# Patient Record
Sex: FEMALE | Race: BLACK OR AFRICAN AMERICAN | NOT HISPANIC OR LATINO | Employment: OTHER | URBAN - METROPOLITAN AREA
[De-identification: names, ages, dates, MRNs, and addresses within clinical notes are randomized per-mention and may not be internally consistent; named-entity substitution may affect disease eponyms.]

---

## 2017-06-08 ENCOUNTER — OFFICE VISIT (OUTPATIENT)
Dept: OTOLARYNGOLOGY | Facility: CLINIC | Age: 67
End: 2017-06-08
Payer: MEDICARE

## 2017-06-08 VITALS
WEIGHT: 189.63 LBS | SYSTOLIC BLOOD PRESSURE: 129 MMHG | HEART RATE: 80 BPM | HEIGHT: 68 IN | DIASTOLIC BLOOD PRESSURE: 80 MMHG | BODY MASS INDEX: 28.74 KG/M2

## 2017-06-08 DIAGNOSIS — H93.8X3 EAR PRESSURE, BILATERAL: ICD-10-CM

## 2017-06-08 DIAGNOSIS — H61.23 HEARING LOSS DUE TO CERUMEN IMPACTION, BILATERAL: ICD-10-CM

## 2017-06-08 DIAGNOSIS — H61.23 BILATERAL IMPACTED CERUMEN: Primary | ICD-10-CM

## 2017-06-08 PROCEDURE — 1126F AMNT PAIN NOTED NONE PRSNT: CPT | Mod: S$GLB,,, | Performed by: NURSE PRACTITIONER

## 2017-06-08 PROCEDURE — 69210 REMOVE IMPACTED EAR WAX UNI: CPT | Mod: S$GLB,,, | Performed by: NURSE PRACTITIONER

## 2017-06-08 PROCEDURE — 1159F MED LIST DOCD IN RCRD: CPT | Mod: S$GLB,,, | Performed by: NURSE PRACTITIONER

## 2017-06-08 PROCEDURE — 99203 OFFICE O/P NEW LOW 30 MIN: CPT | Mod: 25,S$GLB,, | Performed by: NURSE PRACTITIONER

## 2017-06-08 PROCEDURE — 99999 PR PBB SHADOW E&M-EST. PATIENT-LVL III: CPT | Mod: PBBFAC,,, | Performed by: NURSE PRACTITIONER

## 2017-06-08 NOTE — PROGRESS NOTES
Subjective:       Patient ID: Tali Fitzpatrick is a 67 y.o. female.    Chief Complaint: Cerumen Impaction and Hearing Loss    HPI   Patient reports bilateral aural fullness and muffled hearing. Daughter tells her TV volume is too loud. She has not been able to hear well for approx one month. She has mild discomfort described as pressure AU, but denies otalgia or otorrhea.     Review of Systems   Constitutional: Negative.    HENT: Positive for hearing loss. Negative for ear discharge and ear pain.         Ear pressure   Eyes: Negative.    Respiratory: Negative.    Cardiovascular: Negative.    Gastrointestinal: Negative.    Musculoskeletal: Negative.    Skin: Negative.    Neurological: Negative.    Hematological: Negative.    Psychiatric/Behavioral: Negative.        Objective:      Physical Exam   Constitutional: She is oriented to person, place, and time. Vital signs are normal. She appears well-developed and well-nourished. She is cooperative. She does not appear ill. No distress.   HENT:   Head: Normocephalic and atraumatic.   Right Ear: Hearing, tympanic membrane, external ear and ear canal normal. Tympanic membrane is not erythematous. No middle ear effusion.   Left Ear: Hearing, tympanic membrane, external ear and ear canal normal. Tympanic membrane is not erythematous.  No middle ear effusion.   Nose: Nose normal. No mucosal edema or rhinorrhea. Right sinus exhibits no maxillary sinus tenderness and no frontal sinus tenderness. Left sinus exhibits no maxillary sinus tenderness and no frontal sinus tenderness.   Mouth/Throat: Uvula is midline, oropharynx is clear and moist and mucous membranes are normal. Mucous membranes are not pale, not dry and not cyanotic. No oral lesions. No oropharyngeal exudate, posterior oropharyngeal edema or posterior oropharyngeal erythema.     SEPARATE PROCEDURE IN OFFICE:   Procedure: Removal of impacted cerumen, bilateral   Pre Procedure Diagnosis: Cerumen Impaction   Post  Procedure Diagnosis: Cerumen Impaction   Verbal informed consent in regards to risk of trauma to ear canal, ear drum or hearing, discomfort during procedure and/or inability to remove cerumen impaction in one session or unforeseen events or complications.   No anesthesia.     Procedure in detail:   Ear canal visualized bilateral with appropriate size ear speculum utilizing Operating Head Binocular Otomicroscope   Utilizing the following: Ring curet, delicate alligator forceps, and/or suction cannula. The impacted cerumen of the ear canals was removed atraumatically. The TM and EAC were then inspected and found to be clear of wax. See description of TMs/EACs in PE above.   Complications: No   Condition: Improved/Good     Eyes: Conjunctivae, EOM and lids are normal. Pupils are equal, round, and reactive to light. Right eye exhibits no discharge. Left eye exhibits no discharge. No scleral icterus.   Neck: Trachea normal and normal range of motion. Neck supple. No tracheal deviation present. No thyroid mass and no thyromegaly present.   Cardiovascular: Normal rate.    Pulmonary/Chest: Effort normal. No stridor. No respiratory distress. She has no wheezes.   Musculoskeletal: Normal range of motion.   Lymphadenopathy:        Head (right side): No submental, no submandibular, no tonsillar, no preauricular and no posterior auricular adenopathy present.        Head (left side): No submental, no submandibular, no tonsillar, no preauricular and no posterior auricular adenopathy present.     She has no cervical adenopathy.        Right cervical: No superficial cervical and no posterior cervical adenopathy present.       Left cervical: No superficial cervical and no posterior cervical adenopathy present.   Neurological: She is alert and oriented to person, place, and time. She has normal strength. Coordination and gait normal.   Skin: Skin is warm, dry and intact. No lesion and no rash noted. She is not diaphoretic. No cyanosis.  No pallor.   Psychiatric: She has a normal mood and affect. Her speech is normal and behavior is normal. Judgment and thought content normal. Cognition and memory are normal.   Nursing note and vitals reviewed.      Assessment:     Ear pressure, bilateral, new problem    Muffled hearing, bilateral, new problem  Cerumen impactions removed bilaterally  Plan:     Patient reported immediate resolution of aural symptoms following cerumen impaction removal and did not wish to proceed with audiogram at this time.    Return to clinic as needed for further ENT concerns

## 2017-06-13 DIAGNOSIS — M25.561 PAIN IN BOTH KNEES, UNSPECIFIED CHRONICITY: Primary | ICD-10-CM

## 2017-06-13 DIAGNOSIS — M25.562 PAIN IN BOTH KNEES, UNSPECIFIED CHRONICITY: Primary | ICD-10-CM

## 2017-06-22 ENCOUNTER — OFFICE VISIT (OUTPATIENT)
Dept: PHYSICAL MEDICINE AND REHAB | Facility: CLINIC | Age: 67
End: 2017-06-22
Payer: MEDICARE

## 2017-06-22 ENCOUNTER — HOSPITAL ENCOUNTER (OUTPATIENT)
Dept: RADIOLOGY | Facility: HOSPITAL | Age: 67
Discharge: HOME | End: 2017-06-22
Attending: ORTHOPAEDIC SURGERY
Payer: MEDICARE

## 2017-06-22 VITALS
BODY MASS INDEX: 28.64 KG/M2 | DIASTOLIC BLOOD PRESSURE: 83 MMHG | WEIGHT: 189 LBS | SYSTOLIC BLOOD PRESSURE: 132 MMHG | HEIGHT: 68 IN | HEART RATE: 79 BPM

## 2017-06-22 DIAGNOSIS — M25.561 PAIN IN BOTH KNEES, UNSPECIFIED CHRONICITY: ICD-10-CM

## 2017-06-22 DIAGNOSIS — M17.0 BILATERAL PRIMARY OSTEOARTHRITIS OF KNEE: Primary | ICD-10-CM

## 2017-06-22 DIAGNOSIS — G89.29 CHRONIC PAIN OF BOTH KNEES: Primary | ICD-10-CM

## 2017-06-22 DIAGNOSIS — M17.0 PRIMARY OSTEOARTHRITIS OF BOTH KNEES: ICD-10-CM

## 2017-06-22 DIAGNOSIS — M25.562 CHRONIC PAIN OF BOTH KNEES: Primary | ICD-10-CM

## 2017-06-22 DIAGNOSIS — M25.561 CHRONIC PAIN OF BOTH KNEES: Primary | ICD-10-CM

## 2017-06-22 DIAGNOSIS — M25.562 PAIN IN BOTH KNEES, UNSPECIFIED CHRONICITY: ICD-10-CM

## 2017-06-22 PROCEDURE — 99999 PR PBB SHADOW E&M-EST. PATIENT-LVL II: CPT | Mod: PBBFAC,,, | Performed by: PHYSICAL MEDICINE & REHABILITATION

## 2017-06-22 PROCEDURE — 99204 OFFICE O/P NEW MOD 45 MIN: CPT | Mod: S$GLB,,, | Performed by: PHYSICAL MEDICINE & REHABILITATION

## 2017-06-22 PROCEDURE — 1125F AMNT PAIN NOTED PAIN PRSNT: CPT | Mod: S$GLB,,, | Performed by: PHYSICAL MEDICINE & REHABILITATION

## 2017-06-22 PROCEDURE — 1159F MED LIST DOCD IN RCRD: CPT | Mod: S$GLB,,, | Performed by: PHYSICAL MEDICINE & REHABILITATION

## 2017-06-22 PROCEDURE — 73562 X-RAY EXAM OF KNEE 3: CPT | Mod: 26,50,, | Performed by: RADIOLOGY

## 2017-06-22 RX ORDER — MIDAZOLAM HYDROCHLORIDE 5 MG/ML
2 INJECTION INTRAMUSCULAR; INTRAVENOUS ONCE AS NEEDED
Status: CANCELLED | OUTPATIENT
Start: 2017-06-22 | End: 2017-06-22

## 2017-06-22 RX ORDER — ALPRAZOLAM 0.5 MG/1
1 TABLET ORAL NIGHTLY PRN
COMMUNITY
Start: 2017-06-20 | End: 2018-11-06 | Stop reason: DRUGHIGH

## 2017-06-22 RX ORDER — LIDOCAINE HYDROCHLORIDE 10 MG/ML
1 INJECTION, SOLUTION EPIDURAL; INFILTRATION; INTRACAUDAL; PERINEURAL ONCE
Status: CANCELLED | OUTPATIENT
Start: 2017-06-22 | End: 2017-06-22

## 2017-06-22 RX ORDER — SODIUM CHLORIDE, SODIUM LACTATE, POTASSIUM CHLORIDE, CALCIUM CHLORIDE 600; 310; 30; 20 MG/100ML; MG/100ML; MG/100ML; MG/100ML
INJECTION, SOLUTION INTRAVENOUS CONTINUOUS
Status: CANCELLED | OUTPATIENT
Start: 2017-06-22

## 2017-06-22 NOTE — PROGRESS NOTES
"[facility]    CHIEF COMPLAINT:   Chief Complaint   Patient presents with    Knee Pain     bilat knee pain     HISTORY OF PRESENT ILLNESS: Tali Fitzpatrick is a 67 y.o. female who presents to me for initial evaluation for bilateral knee pain L>R. She is interested in receiving cooled RFA for both her knees. She has been having knee pain for 10 years, and diagnosed with OA. She has had steroid injections in the past with no relief. She has had gel injections as well, which used to give her long term relief, however they no longer give her benefit. Her last gel injection was 1 year ago and did not get her any relief. She endorses her knees are more painful after activity, and occasionally will swell. They will also get "stuck" when they are extremely painful. Denies any grinding or clicking sensation. Her pain is achy in nature and she rates her pain as a 5/10.    Review of Systems   Constitutional: Negative for fever.   HENT: Negative for drooling.    Eyes: Negative for discharge.   Respiratory: Negative for choking.    Cardiovascular: Negative for chest pain.   Genitourinary: Negative for flank pain.   Skin: Negative for wound.   Allergic/Immunologic: Negative for immunocompromised state.   Neurological: Negative for tremors and syncope.   Psychiatric/Behavioral: Negative for behavioral problems.     Past Medical History:   Past Medical History:   Diagnosis Date    Anxiety and depression     DJD (degenerative joint disease)     bilateral knees   Hyalgan series completed 8/27/14    Hyperlipidemia     Hypertension     Insomnia        Past Surgical History:   Past Surgical History:   Procedure Laterality Date    CATARACT EXTRACTION W/  INTRAOCULAR LENS IMPLANT      left    LIPOSUCTION         Family History:   Family History   Problem Relation Age of Onset    Arthritis Mother     Coronary artery disease Mother     Coronary artery disease Father        Medications:   Current Outpatient " "Prescriptions on File Prior to Visit   Medication Sig Dispense Refill    amlodipine (NORVASC) 5 MG tablet Take 5 mg by mouth once daily.  3    atenolol (TENORMIN) 50 MG tablet       escitalopram oxalate (LEXAPRO) 20 MG tablet       gabapentin (NEURONTIN) 300 MG capsule Take 300 mg by mouth once daily.  3    hydrOXYzine (VISTARIL) 50 MG Cap   6    pravastatin (PRAVACHOL) 20 MG tablet Take 20 mg by mouth once daily.  6    temazepam (RESTORIL) 30 mg capsule   2     No current facility-administered medications on file prior to visit.        Allergies:   Review of patient's allergies indicates:   Allergen Reactions    Micardis [telmisartan]      Can't remember       Social History:   Social History     Social History    Marital status:      Spouse name: N/A    Number of children: N/A    Years of education: N/A     Social History Main Topics    Smoking status: Never Smoker    Smokeless tobacco: None    Alcohol use 0.0 oz/week      Comment: social    Drug use: No    Sexual activity: Not Asked     Other Topics Concern    None     Social History Narrative    None     Tali is a retired .    PHYSICAL EXAMINATION:   General    Vitals:    06/22/17 1250   BP: 132/83   Pulse: 79   Weight: 85.7 kg (189 lb)   Height: 5' 8" (1.727 m)     Constitutional: Oriented to person, place, and time. No apparent distress. Appears well-developed and well-nourished. Pleasant.  HENT:   Head: Normocephalic and atraumatic.   Eyes: Right eye exhibits no discharge. Left eye exhibits no discharge. No scleral icterus.   Pulmonary/Chest: Effort normal. No respiratory distress.   Abdominal: There is no guarding.   Neurological: Alert and oriented to person, place, and time.   Psychiatric: Behavior is normal.   Right Knee Exam     Tenderness   The patient is experiencing tenderness in the lateral joint line and medial joint line.    Range of Motion   Extension: 0   Flexion: 130     Muscle Strength     The patient " has normal right knee strength.    Tests   Mary:  Medial - negative Lateral - negative  Lachman:  Anterior - negative      Varus: negative  Valgus: negative    Other   Erythema: absent  Scars: absent  Sensation: normal  Pulse: present  Swelling: none  Other tests: no effusion present      Left Knee Exam     Tenderness   The patient is experiencing tenderness in the lateral joint line and medial joint line.    Range of Motion   Extension: 0   Flexion: 130     Muscle Strength     The patient has normal left knee strength.    Tests   Mary:  Medial - negative Lateral - negative  Lachman:  Anterior - negative      Varus: negative  Valgus: negative    Other   Erythema: absent  Scars: absent  Sensation: normal  Swelling: none  Effusion: no effusion present        Imaging    AP standing views of both knees as well as lateral and sunrise views of both knees are obtained..  No fracture is identified.  Joint effusion is not seen on either side.  On both sides there is marked narrowing of the medial intercondylar joint space with irregularity of the articular surface of the femur condyle.  There is also narrowing of the lateral intercondylar joint space.  Spur formation of the patella is seen on both sides.   Impression      Moderately severe osteoarthritis, both knees.  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     Data Reviewed: X-ray    Supportive Actions: Independent visualization of images or test specimens    ASSESSMENT:   1. Chronic pain of both knees    2. Primary osteoarthritis of both knees      PLAN:     1. Time was spent reviewing the above diagnosis in depth with Tali today, including acute management and rehabilitation.     2.  She has signs, symptoms, and x-ray findings all consistent with a diagnosis of bilateral knee osteoarthritis.  She has had multiple steroid and HA injections in the past which no longer give her relief. She is trying to delay/avoid surgery and is  interested in the cooled RFA procedure. I do believe she is a good candidate for this procedure, and we will first attempt the diagnostic block of the genicular nerves to each knee before proceeding with the radiofrequency ablation procedure.    3. Scheduled for bilateral diagnostic genicular blocks. Should this give her significant relief, will schedule her for bilateral geniculate RFA.    The above note was completed, in part, with the aid of Dragon dictation software/hardware. Translation errors may be present.

## 2017-06-30 ENCOUNTER — HOSPITAL ENCOUNTER (OUTPATIENT)
Facility: HOSPITAL | Age: 67
Discharge: HOME | End: 2017-06-30
Attending: PHYSICAL MEDICINE & REHABILITATION | Admitting: PHYSICAL MEDICINE & REHABILITATION
Payer: MEDICARE

## 2017-06-30 ENCOUNTER — HOSPITAL ENCOUNTER (OUTPATIENT)
Dept: RADIOLOGY | Facility: HOSPITAL | Age: 67
Discharge: HOME | End: 2017-06-30
Attending: PHYSICAL MEDICINE & REHABILITATION | Admitting: PHYSICAL MEDICINE & REHABILITATION
Payer: MEDICARE

## 2017-06-30 VITALS
HEIGHT: 68 IN | WEIGHT: 185 LBS | TEMPERATURE: 97 F | HEART RATE: 63 BPM | BODY MASS INDEX: 28.04 KG/M2 | OXYGEN SATURATION: 100 % | SYSTOLIC BLOOD PRESSURE: 168 MMHG | DIASTOLIC BLOOD PRESSURE: 78 MMHG | RESPIRATION RATE: 20 BRPM

## 2017-06-30 DIAGNOSIS — M17.0 BILATERAL PRIMARY OSTEOARTHRITIS OF KNEE: ICD-10-CM

## 2017-06-30 DIAGNOSIS — M25.562 BILATERAL KNEE PAIN: ICD-10-CM

## 2017-06-30 DIAGNOSIS — M25.561 BILATERAL KNEE PAIN: ICD-10-CM

## 2017-06-30 PROCEDURE — 76000 FLUOROSCOPY <1 HR PHYS/QHP: CPT | Mod: TC,PO

## 2017-06-30 PROCEDURE — 64450 NJX AA&/STRD OTHER PN/BRANCH: CPT | Mod: 50,,, | Performed by: PHYSICAL MEDICINE & REHABILITATION

## 2017-06-30 PROCEDURE — 25000003 PHARM REV CODE 250: Mod: PO | Performed by: PHYSICAL MEDICINE & REHABILITATION

## 2017-06-30 PROCEDURE — 64450 NJX AA&/STRD OTHER PN/BRANCH: CPT | Mod: 50,PO | Performed by: PHYSICAL MEDICINE & REHABILITATION

## 2017-06-30 PROCEDURE — 63600175 PHARM REV CODE 636 W HCPCS: Mod: PO | Performed by: PHYSICAL MEDICINE & REHABILITATION

## 2017-06-30 RX ORDER — BUPIVACAINE HYDROCHLORIDE 2.5 MG/ML
INJECTION, SOLUTION EPIDURAL; INFILTRATION; INTRACAUDAL
Status: DISCONTINUED | OUTPATIENT
Start: 2017-06-30 | End: 2017-06-30 | Stop reason: HOSPADM

## 2017-06-30 RX ORDER — MIDAZOLAM HYDROCHLORIDE 5 MG/ML
2 INJECTION INTRAMUSCULAR; INTRAVENOUS ONCE AS NEEDED
Status: COMPLETED | OUTPATIENT
Start: 2017-06-30 | End: 2017-06-30

## 2017-06-30 RX ORDER — SODIUM CHLORIDE, SODIUM LACTATE, POTASSIUM CHLORIDE, CALCIUM CHLORIDE 600; 310; 30; 20 MG/100ML; MG/100ML; MG/100ML; MG/100ML
INJECTION, SOLUTION INTRAVENOUS CONTINUOUS
Status: DISCONTINUED | OUTPATIENT
Start: 2017-06-30 | End: 2017-06-30 | Stop reason: HOSPADM

## 2017-06-30 RX ORDER — LIDOCAINE HYDROCHLORIDE 10 MG/ML
INJECTION, SOLUTION EPIDURAL; INFILTRATION; INTRACAUDAL; PERINEURAL
Status: DISCONTINUED | OUTPATIENT
Start: 2017-06-30 | End: 2017-06-30 | Stop reason: HOSPADM

## 2017-06-30 RX ORDER — LIDOCAINE HYDROCHLORIDE 10 MG/ML
1 INJECTION, SOLUTION EPIDURAL; INFILTRATION; INTRACAUDAL; PERINEURAL ONCE
Status: COMPLETED | OUTPATIENT
Start: 2017-06-30 | End: 2017-06-30

## 2017-06-30 RX ADMIN — SODIUM CHLORIDE, SODIUM LACTATE, POTASSIUM CHLORIDE, AND CALCIUM CHLORIDE: .6; .31; .03; .02 INJECTION, SOLUTION INTRAVENOUS at 07:06

## 2017-06-30 RX ADMIN — LIDOCAINE HYDROCHLORIDE: 10 INJECTION, SOLUTION EPIDURAL; INFILTRATION; INTRACAUDAL; PERINEURAL at 07:06

## 2017-06-30 RX ADMIN — MIDAZOLAM HYDROCHLORIDE 2 MG: 5 INJECTION, SOLUTION INTRAMUSCULAR; INTRAVENOUS at 08:06

## 2017-06-30 NOTE — DISCHARGE INSTRUCTIONS
Diagnostic Block Pain Diary  Name:______________________________________       Directions:   At each time point listed below, please rate your pain using the scale below     Average pain before the nerve block:_____________________                      Hour after procedure                        Pain Level   1    2    3    4    8    12    16    20    24

## 2017-06-30 NOTE — OP NOTE
Operative Note       Surgery Date: 6/30/2017     Surgeon(s) and Role:     * Deon Ragland MD - Primary    Pre-op Diagnosis:  Bilateral primary osteoarthritis of knee [M17.0]    Post-op Diagnosis: Post-Op Diagnosis Codes:     * Bilateral primary osteoarthritis of knee [M17.0]    Procedure(s) (LRB):  Diagnostic block of the genicular branches to the bilateral knees (Bilateral) x 3 nerve branches in each knee    Anesthesia: RN IV Sedation    Procedure in Detail/Findings:    Description of Procedure:     A 67 y.o. male with chronic bilateral knee pain presents for an elective genicular nerve branch block x 3 individual nerve branches for each knee. We discussed risks, benefits, and alternatives. She has failed conservative care. We are going to see if a water-cooled radiofrequency ablation procedure to the genicular nerve branches would benefit her knee pain. Patient's verbal and written consent was obtained. She was brought to the fluoroscopic suite, placed in supine position. Bilateral knees were prepped in the usual sterile fashion. Starting with the left knee, using AP and lateral views, the locations of the infero-medial genicular nerve, superior medial genicular nerve, and superior-lateral genicular nerve were marked with a sterile marker. Next, a 27-gauge needle was used to inject 3-5 cc of 1 % lidocaine at each site for local anesthesia. Finally, a 25-gauge needle was introduced down to each of the above 3 separate nerve locations using AP views. Then lateral fluoroscopic views were obtained to assure the needle was at the appropriate mid shaft location. Once this was the positioning was deemed adequate, then the stylette was removed in each needle and 1cc of 0.25% Marcaine was injected. The stylette was replaced after each injection. The needles were then removed and a sterile dressing with antibiotic ointment was applied. The same procedure was repeated on the right side. The patient was returned to  the recovery area in stable condition. There were no complications. Neurovascular exam was performed and revealed no injury. Patient was discharged to follow up in the clinic in several days. We discussed postoperative protocol including the recording of the post-procedure pain diary and voiced understanding.     Estimated Blood Loss: Minimal           Specimens     None                          Condition: Good    Attestation:  I performed the procedure.           Discharge Note    Admit Date: 6/30/2017    Attending Physician: Deon Ragland MD     Discharge Physician: Deon Ragland MD    Final Diagnosis: Post-Op Diagnosis Codes:     * Bilateral primary osteoarthritis of knee [M17.0]    Disposition: Home or Self Care, pt discharged in good condition and we will follow up with her by phone in 5 days.    Patient Instructions:   Current Discharge Medication List      CONTINUE these medications which have NOT CHANGED    Details   alprazolam (XANAX) 0.5 MG tablet Take 0.5 mg by mouth nightly as needed.       amlodipine (NORVASC) 5 MG tablet Take 5 mg by mouth once daily.  Refills: 3      atenolol (TENORMIN) 50 MG tablet       escitalopram oxalate (LEXAPRO) 20 MG tablet Take 20 mg by mouth once daily.       gabapentin (NEURONTIN) 300 MG capsule Take 300 mg by mouth once daily.  Refills: 3      hydrOXYzine (VISTARIL) 50 MG Cap Take 50 mg by mouth every 8 (eight) hours as needed.   Refills: 6      pravastatin (PRAVACHOL) 20 MG tablet Take 20 mg by mouth once daily.  Refills: 6             Discharge Procedure Orders (must include Diet, Follow-up, Activity)    Discharge Procedure Orders (must include Diet, Follow-up, Activity)  Diet general     Activity as tolerated     Shower on day dressing removed (No bath)     Ice to affected area     Call MD for:  temperature >100.4     Call MD for:  persistent nausea and vomiting     Call MD for:  severe uncontrolled pain     Call MD for:  difficulty breathing, headache  or visual disturbances     Call MD for:  redness, tenderness, or signs of infection (pain, swelling, redness, odor or green/yellow discharge around incision site)     Call MD for:  hives     Call MD for:  persistent dizziness or light-headedness     Call MD for:  extreme fatigue     No dressing needed          Discharge Date: 6/30/17

## 2017-07-05 ENCOUNTER — TELEPHONE (OUTPATIENT)
Dept: PHYSICAL MEDICINE AND REHAB | Facility: CLINIC | Age: 67
End: 2017-07-05

## 2017-07-10 ENCOUNTER — TELEPHONE (OUTPATIENT)
Dept: PHYSICAL MEDICINE AND REHAB | Facility: CLINIC | Age: 67
End: 2017-07-10

## 2017-07-10 NOTE — TELEPHONE ENCOUNTER
----- Message from Tiffani Castañeda sent at 7/10/2017 10:08 AM CDT -----  Contact: call  //[phone]  Calling to   Speak to  The  Nurse // please call

## 2017-07-10 NOTE — TELEPHONE ENCOUNTER
----- Message from Tae Benton sent at 7/7/2017  3:17 PM CDT -----  Contact: same  Patient called in and wanted to check the status of a call back she had requested earlier from office.  Patient call back number is [phone]

## 2017-07-10 NOTE — TELEPHONE ENCOUNTER
Spoke with patient she will come to the clinic on Thurs to sign consents to go forward with the cooled RF on the knee

## 2017-07-13 DIAGNOSIS — M17.0 BILATERAL PRIMARY OSTEOARTHRITIS OF KNEE: Primary | ICD-10-CM

## 2017-07-14 ENCOUNTER — HOSPITAL ENCOUNTER (OUTPATIENT)
Facility: HOSPITAL | Age: 67
Discharge: HOME | End: 2017-07-14
Attending: PHYSICAL MEDICINE & REHABILITATION | Admitting: PHYSICAL MEDICINE & REHABILITATION
Payer: MEDICARE

## 2017-07-14 ENCOUNTER — HOSPITAL ENCOUNTER (OUTPATIENT)
Dept: RADIOLOGY | Facility: HOSPITAL | Age: 67
Discharge: HOME | End: 2017-07-14
Attending: PHYSICAL MEDICINE & REHABILITATION | Admitting: PHYSICAL MEDICINE & REHABILITATION
Payer: MEDICARE

## 2017-07-14 ENCOUNTER — SURGERY (OUTPATIENT)
Age: 67
End: 2017-07-14

## 2017-07-14 VITALS
OXYGEN SATURATION: 100 % | TEMPERATURE: 98 F | RESPIRATION RATE: 16 BRPM | BODY MASS INDEX: 28.04 KG/M2 | WEIGHT: 185 LBS | SYSTOLIC BLOOD PRESSURE: 95 MMHG | DIASTOLIC BLOOD PRESSURE: 54 MMHG | HEIGHT: 68 IN | HEART RATE: 61 BPM

## 2017-07-14 DIAGNOSIS — M25.561 CHRONIC PAIN OF BOTH KNEES: ICD-10-CM

## 2017-07-14 DIAGNOSIS — M17.0 BILATERAL PRIMARY OSTEOARTHRITIS OF KNEE: ICD-10-CM

## 2017-07-14 DIAGNOSIS — M25.561 BILATERAL KNEE PAIN: ICD-10-CM

## 2017-07-14 DIAGNOSIS — G89.29 CHRONIC PAIN OF BOTH KNEES: ICD-10-CM

## 2017-07-14 DIAGNOSIS — M25.562 CHRONIC PAIN OF BOTH KNEES: ICD-10-CM

## 2017-07-14 DIAGNOSIS — M25.562 BILATERAL KNEE PAIN: ICD-10-CM

## 2017-07-14 PROCEDURE — 37000009 HC ANESTHESIA EA ADD 15 MINS: Mod: PO | Performed by: PHYSICAL MEDICINE & REHABILITATION

## 2017-07-14 PROCEDURE — 25000003 PHARM REV CODE 250: Mod: PO | Performed by: PHYSICAL MEDICINE & REHABILITATION

## 2017-07-14 PROCEDURE — 71000033 HC RECOVERY, INTIAL HOUR: Mod: PO | Performed by: PHYSICAL MEDICINE & REHABILITATION

## 2017-07-14 PROCEDURE — 36000705 HC OR TIME LEV I EA ADD 15 MIN: Mod: PO | Performed by: PHYSICAL MEDICINE & REHABILITATION

## 2017-07-14 PROCEDURE — 36000704 HC OR TIME LEV I 1ST 15 MIN: Mod: PO | Performed by: PHYSICAL MEDICINE & REHABILITATION

## 2017-07-14 PROCEDURE — 64640 INJECTION TREATMENT OF NERVE: CPT | Mod: 50,PO | Performed by: PHYSICAL MEDICINE & REHABILITATION

## 2017-07-14 PROCEDURE — 76000 FLUOROSCOPY <1 HR PHYS/QHP: CPT | Mod: TC,PO

## 2017-07-14 PROCEDURE — 37000008 HC ANESTHESIA 1ST 15 MINUTES: Mod: PO | Performed by: PHYSICAL MEDICINE & REHABILITATION

## 2017-07-14 PROCEDURE — 27201423 OPTIME MED/SURG SUP & DEVICES STERILE SUPPLY: Mod: PO | Performed by: PHYSICAL MEDICINE & REHABILITATION

## 2017-07-14 PROCEDURE — 63600175 PHARM REV CODE 636 W HCPCS: Mod: PO | Performed by: PHYSICAL MEDICINE & REHABILITATION

## 2017-07-14 PROCEDURE — 64640 INJECTION TREATMENT OF NERVE: CPT | Mod: 50,,, | Performed by: PHYSICAL MEDICINE & REHABILITATION

## 2017-07-14 RX ORDER — FENTANYL CITRATE 50 UG/ML
INJECTION, SOLUTION INTRAMUSCULAR; INTRAVENOUS
Status: DISCONTINUED | OUTPATIENT
Start: 2017-07-14 | End: 2017-07-14 | Stop reason: HOSPADM

## 2017-07-14 RX ORDER — DEXAMETHASONE SODIUM PHOSPHATE 4 MG/ML
INJECTION, SOLUTION INTRA-ARTICULAR; INTRALESIONAL; INTRAMUSCULAR; INTRAVENOUS; SOFT TISSUE
Status: DISCONTINUED | OUTPATIENT
Start: 2017-07-14 | End: 2017-07-14 | Stop reason: HOSPADM

## 2017-07-14 RX ORDER — LIDOCAINE HYDROCHLORIDE 10 MG/ML
INJECTION, SOLUTION EPIDURAL; INFILTRATION; INTRACAUDAL; PERINEURAL
Status: DISCONTINUED | OUTPATIENT
Start: 2017-07-14 | End: 2017-07-14 | Stop reason: HOSPADM

## 2017-07-14 RX ORDER — SODIUM CHLORIDE, SODIUM LACTATE, POTASSIUM CHLORIDE, CALCIUM CHLORIDE 600; 310; 30; 20 MG/100ML; MG/100ML; MG/100ML; MG/100ML
INJECTION, SOLUTION INTRAVENOUS CONTINUOUS
Status: DISCONTINUED | OUTPATIENT
Start: 2017-07-14 | End: 2017-07-14 | Stop reason: HOSPADM

## 2017-07-14 RX ORDER — MIDAZOLAM HYDROCHLORIDE 2 MG/2ML
INJECTION, SOLUTION INTRAMUSCULAR; INTRAVENOUS
Status: DISCONTINUED | OUTPATIENT
Start: 2017-07-14 | End: 2017-07-14 | Stop reason: HOSPADM

## 2017-07-14 RX ORDER — LIDOCAINE HYDROCHLORIDE 20 MG/ML
INJECTION, SOLUTION EPIDURAL; INFILTRATION; INTRACAUDAL; PERINEURAL
Status: DISCONTINUED | OUTPATIENT
Start: 2017-07-14 | End: 2017-07-14 | Stop reason: HOSPADM

## 2017-07-14 RX ADMIN — MIDAZOLAM HYDROCHLORIDE 2 MG: 1 INJECTION, SOLUTION INTRAMUSCULAR; INTRAVENOUS at 07:07

## 2017-07-14 RX ADMIN — LIDOCAINE HYDROCHLORIDE 6 ML: 20 INJECTION, SOLUTION EPIDURAL; INFILTRATION; INTRACAUDAL; PERINEURAL at 08:07

## 2017-07-14 RX ADMIN — SODIUM CHLORIDE, SODIUM LACTATE, POTASSIUM CHLORIDE, AND CALCIUM CHLORIDE: .6; .31; .03; .02 INJECTION, SOLUTION INTRAVENOUS at 06:07

## 2017-07-14 RX ADMIN — FENTANYL CITRATE 25 MCG: 50 INJECTION, SOLUTION INTRAMUSCULAR; INTRAVENOUS at 07:07

## 2017-07-14 RX ADMIN — LIDOCAINE HYDROCHLORIDE 33 ML: 10 INJECTION, SOLUTION EPIDURAL; INFILTRATION; INTRACAUDAL; PERINEURAL at 08:07

## 2017-07-14 RX ADMIN — DEXAMETHASONE SODIUM PHOSPHATE 8 MG: 4 INJECTION, SOLUTION INTRAMUSCULAR; INTRAVENOUS at 08:07

## 2017-07-14 NOTE — OP NOTE
Operative Note       Surgery Date: 7/14/2017     Surgeon(s) and Role:     * Deon Ragland MD - Primary    Pre-op Diagnosis:  Bilateral primary osteoarthritis of knee [M17.0]    Post-op Diagnosis: Post-Op Diagnosis Codes:     * Bilateral primary osteoarthritis of knee [M17.0]    Procedure:  1.  Bilateral knee genicular nerve radiofrequency ablation ×3 nerve branches.  2.  Fluoroscopic guidance.    Anesthesia: RN Sedation    Description of Procedure:    Tali Fitzpatrick is a 67 y.o. female with chronic bilateral knee pain/osteoarthritis presents for an elective radiofrequency genicular nerve ablation of a total of 3 genicular branches in each knee. She did get significant pain relief from the diagnostic block of the genicular nerves and has elected to undergo the RF procedure in the hopes of longer term pain relief. We discussed risks, benefits, and alternatives. Patient's verbal and written consent was obtained. She was brought to the fluoro scopic suite, placed in supine position. The right knee was prepped and draped in the usual sterile fashion. 1% lidocaine was used to anesthetize the overlying subcutaneous cutaneous tissues at each of the 3 sites using a 25-gauge 3.5 inch needle on a 10 cc syringe. Using AP and lateral views, a 17-gauge introducer needle was guided under intermittent fluoroscopic imaging first to the distal metaphysis of the medial aspect of the femur. A similar needle was placed at the lateral aspect of the distal femur. A third introducer needle was guided to the proximal aspect of the tibial metaphysis medially. All 3 needles being placed near or around the genicular nerve origin prior to entering the knee joint. AP views were obtained to assure proper location then lateral fluoroscopic views were obtained to assure the needle was at the appropriate mid shaft location. Motor stimulation was then performed at 2 V. There were no observed muscle contractions at either of the 3 sites.  Following motor stimulation 1 cc of 2% lidocaine was then injected into the introducer needles and stylets were replaced. Next, the radiofrequency probe was placed into the introducer needle and radiofrequency lesioning was performed at 80°C for 2 minutes, 30 seconds. Next, the probe was removed and placed in the second genicular location and radiofrequency lesioning was performed here. The third radio frequency lesioning was performed at the final branche. Following lesioning, 1 cc of a mixture of dexamethasone, 4 mg, and 3 cc of lidocaine 1% was injected through the introducer needles. The needles were then re-styletted and removed. The same procedure was then repeated on the left knee. A total of 6 nerve branches were lesioned today. Band-Aids were applied to the puncture sites after applying antibiotic ointment. The patient was transported to the postoperative recovery area in good condition. Approximately 30 minutes after the procedure, motor strength examination revealed no weakness. The patient reported no paresthesias in the affected extremity. Patient was discharged in good condition and will follow-up in 2 weeks in the office.      Estimated Blood Loss: Minimal    Attestation:  I performed the procedure.           Discharge Note    Admit Date: 7/14/2017    Attending Physician: Deon Ragland MD     Discharge Physician: Deon Ragland MD    Final Diagnosis: Post-Op Diagnosis Codes:     * Bilateral primary osteoarthritis of knee [M17.0]    Disposition: Home or Self Care, pt discharged in good condition and she will f/u in clinic in 2 weeks    Patient Instructions:   Current Discharge Medication List      CONTINUE these medications which have NOT CHANGED    Details   alprazolam (XANAX) 0.5 MG tablet Take 0.5 mg by mouth nightly as needed.       amlodipine (NORVASC) 5 MG tablet Take 5 mg by mouth once daily.  Refills: 3      atenolol (TENORMIN) 50 MG tablet       gabapentin (NEURONTIN) 300 MG  capsule Take 300 mg by mouth once daily.  Refills: 3      hydrOXYzine (VISTARIL) 50 MG Cap Take 50 mg by mouth every 8 (eight) hours as needed.   Refills: 6      pravastatin (PRAVACHOL) 20 MG tablet Take 20 mg by mouth once daily.  Refills: 6      escitalopram oxalate (LEXAPRO) 20 MG tablet Take 20 mg by mouth once daily.              Discharge Procedure Orders (must include Diet, Follow-up, Activity)    Discharge Procedure Orders (must include Diet, Follow-up, Activity)  Diet general     Activity as tolerated     Shower on day dressing removed (No bath)     Ice to affected area     Call MD for:  temperature >100.4     Call MD for:  persistent nausea and vomiting     Call MD for:  severe uncontrolled pain     Call MD for:  difficulty breathing, headache or visual disturbances     Call MD for:  redness, tenderness, or signs of infection (pain, swelling, redness, odor or green/yellow discharge around incision site)     Call MD for:  hives     Call MD for:  persistent dizziness or light-headedness     Call MD for:  extreme fatigue     No dressing needed          Discharge Date: 7/14/17

## 2017-07-14 NOTE — PLAN OF CARE
Vss, fredy po fluids, denies pain, ambulates easily.  States ready to go home.  Discharged from facility with family.

## 2017-07-14 NOTE — INTERVAL H&P NOTE
The patient has been examined and the H&P has been reviewed:    I concur with the findings and changes have been noted since the H&P was written: She recieved significant relief from the diagnostic block and she will go forward with the radiofrequency ablation today.    Anesthesia/Surgery risks, benefits and alternative options discussed and understood by patient/family.          Active Hospital Problems    Diagnosis  POA    Bilateral primary osteoarthritis of knee [M17.0]  Yes      Resolved Hospital Problems    Diagnosis Date Resolved POA   No resolved problems to display.

## 2017-07-14 NOTE — DISCHARGE INSTRUCTIONS
Home care instructions  Apply ice pack to the injection site for 20 minutes periods for the first 24 hrs for soreness/discomfort at injection site DO NOT USE HEAT FOR 24 HOURS  Keep site clean and dry for 24 hours, remove bandaid when desired  Do not drive until tomorrow  Avoid strenuous activities for 2 days  Make take 2 weeks to feel the full effects   Resume home medication as prescribed today  Resume Aspirin, Plavix, or Coumadin the day after the procedure unless otherwise instructed.    SEE IMMEDIATE MEDICAL HELP FOR:  Severe increase in your usual pain or appearance of new pain  Prolonged or increasing weakness or numbness in the legs or arms  Drainage, redness, active bleeding, or increased swelling at the injection site  Temperature over 100.0 degrees F.  Headache that increases when your head is upright and decreases when you lie flat    CALL 911 OR GO DIRECTLY TO EMERGENCY DEPARTMENT FOR:  Shortness of breath, chest pain, or problems breathing

## 2017-07-31 ENCOUNTER — TELEPHONE (OUTPATIENT)
Dept: PHYSICAL MEDICINE AND REHAB | Facility: CLINIC | Age: 67
End: 2017-07-31

## 2017-07-31 NOTE — TELEPHONE ENCOUNTER
----- Message from Jason Hickman sent at 7/31/2017 12:51 PM CDT -----  Contact: pt   States she's calling rg needing an xray for appt coming up and can be reached at [phone]//thanks/dbw

## 2017-07-31 NOTE — TELEPHONE ENCOUNTER
Left msg on machine that she does not need an xray for a post of follow up with Dr Ragland but if she had another reason why she felt that she needed an xray she can give me a call back

## 2017-08-02 ENCOUNTER — OFFICE VISIT (OUTPATIENT)
Dept: PHYSICAL MEDICINE AND REHAB | Facility: CLINIC | Age: 67
End: 2017-08-02
Payer: MEDICARE

## 2017-08-02 VITALS
SYSTOLIC BLOOD PRESSURE: 146 MMHG | WEIGHT: 185 LBS | DIASTOLIC BLOOD PRESSURE: 88 MMHG | HEIGHT: 68 IN | HEART RATE: 81 BPM | BODY MASS INDEX: 28.04 KG/M2

## 2017-08-02 DIAGNOSIS — M17.0 BILATERAL PRIMARY OSTEOARTHRITIS OF KNEE: Primary | ICD-10-CM

## 2017-08-02 PROCEDURE — 99999 PR PBB SHADOW E&M-EST. PATIENT-LVL II: CPT | Mod: PBBFAC,,, | Performed by: PHYSICAL MEDICINE & REHABILITATION

## 2017-08-02 PROCEDURE — 99211 OFF/OP EST MAY X REQ PHY/QHP: CPT | Mod: S$GLB,,, | Performed by: PHYSICAL MEDICINE & REHABILITATION

## 2017-08-02 RX ORDER — VENLAFAXINE 37.5 MG/1
TABLET ORAL
COMMUNITY
Start: 2017-06-23 | End: 2018-10-09

## 2017-08-02 NOTE — PROGRESS NOTES
[facility]    CHIEF COMPLAINT:   Chief Complaint   Patient presents with    Follow-up     sherrie knee cooled RF on 7/14/17     HISTORY OF PRESENT ILLNESS: Tali Fitzpatrick is a 67 y.o. female who presents to me 3 weeks status post cooled radiofrequency ablation of the bilateral genicular nerve branches.  She has done well since the procedure reporting near full resolution of her knee pain on the right side.  The left knee is improved compared to her baseline but still causes some pain on occasion.  She notes no pain at rest.  She notes some occasional locking and giving out of the left knee.  She has been continuing to perform routine fitness in the form of stationary cycling.  There are no complaints of increased swelling or warmth of the knees.    Review of Systems   Constitutional: Negative for fever.   HENT: Negative for drooling.    Eyes: Negative for discharge.   Respiratory: Negative for choking.    Cardiovascular: Negative for chest pain.   Genitourinary: Negative for flank pain.   Skin: Negative for wound.   Allergic/Immunologic: Negative for immunocompromised state.   Neurological: Negative for tremors and syncope.   Psychiatric/Behavioral: Negative for behavioral problems.     Past Medical History:   Past Medical History:   Diagnosis Date    Anxiety and depression     DJD (degenerative joint disease)     bilateral knees   Hyalgan series completed 8/27/14    Hyperlipidemia     Hypertension     Insomnia        Past Surgical History:   Past Surgical History:   Procedure Laterality Date    CATARACT EXTRACTION W/  INTRAOCULAR LENS IMPLANT      left    EYE SURGERY      LIPOSUCTION      TONSILLECTOMY         Family History:   Family History   Problem Relation Age of Onset    Arthritis Mother     Coronary artery disease Mother     Coronary artery disease Father        Medications:   Current Outpatient Prescriptions on File Prior to Visit   Medication Sig Dispense Refill    alprazolam  "(XANAX) 0.5 MG tablet Take 0.5 mg by mouth nightly as needed.       amlodipine (NORVASC) 5 MG tablet Take 5 mg by mouth once daily.  3    atenolol (TENORMIN) 50 MG tablet       escitalopram oxalate (LEXAPRO) 20 MG tablet Take 20 mg by mouth once daily.       gabapentin (NEURONTIN) 300 MG capsule Take 300 mg by mouth once daily.  3    hydrOXYzine (VISTARIL) 50 MG Cap Take 50 mg by mouth every 8 (eight) hours as needed.   6    pravastatin (PRAVACHOL) 20 MG tablet Take 20 mg by mouth once daily.  6     No current facility-administered medications on file prior to visit.        Allergies:   Review of patient's allergies indicates:   Allergen Reactions    Micardis [telmisartan]      Can't remember       Social History:   Social History     Social History    Marital status:      Spouse name: N/A    Number of children: N/A    Years of education: N/A     Social History Main Topics    Smoking status: Never Smoker    Smokeless tobacco: Never Used    Alcohol use 0.0 oz/week      Comment: social    Drug use:      Types: Benzodiazepines    Sexual activity: Not Asked     Other Topics Concern    None     Social History Narrative    None     Tali is a retired .    PHYSICAL EXAMINATION:   General    Vitals:    08/02/17 1111   BP: (!) 146/88   Pulse: 81   Weight: 83.9 kg (185 lb)   Height: 5' 8" (1.727 m)     Constitutional: Oriented to person, place, and time. No apparent distress. Appears well-developed and well-nourished. Pleasant.  HENT:   Head: Normocephalic and atraumatic.   Eyes: Right eye exhibits no discharge. Left eye exhibits no discharge. No scleral icterus.   Pulmonary/Chest: Effort normal. No respiratory distress.   Abdominal: There is no guarding.   Neurological: Alert and oriented to person, place, and time.   Psychiatric: Behavior is normal.   Right Knee Exam     Tenderness   The patient is experiencing no tenderness.         Range of Motion   Extension: 0   Flexion: 130 "     Muscle Strength     The patient has normal right knee strength.    Tests   Mary:  Medial - negative Lateral - negative  Lachman:  Anterior - negative      Varus: negative  Valgus: negative    Other   Erythema: absent  Scars: absent  Sensation: normal  Pulse: present  Swelling: none  Other tests: no effusion present      Left Knee Exam     Tenderness   The patient is experiencing tenderness in the medial joint line.    Range of Motion   Extension: 0   Flexion: 130     Muscle Strength     The patient has normal left knee strength.    Tests   Mary:  Medial - negative Lateral - negative  Lachman:  Anterior - negative      Varus: negative  Valgus: negative    Other   Erythema: absent  Scars: absent  Sensation: normal  Swelling: none  Effusion: no effusion present        Imaging    AP standing views of both knees as well as lateral and sunrise views of both knees are obtained..  No fracture is identified.  Joint effusion is not seen on either side.  On both sides there is marked narrowing of the medial intercondylar joint space with irregularity of the articular surface of the femur condyle.  There is also narrowing of the lateral intercondylar joint space.  Spur formation of the patella is seen on both sides.   Impression      Moderately severe osteoarthritis, both knees.  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     Data Reviewed: X-ray    Supportive Actions: Independent visualization of images or test specimens    ASSESSMENT:   1. Bilateral primary osteoarthritis of knee      PLAN:     1.  We are 2 weeks status post cooled radiofrequency of the bilateral knees.  She is reporting good results and definite reduction in pain compared to her baseline.  She is reporting some persistent pain on the left knee, which is the worse side in terms of her radiographs.  There is some locking and giving out sensations at times.  We discussed other conservative options to consider to further  advance her pain reduction.  To this end, I recommended injection of Euflexxa to the left knee.  After discussion she is in favor of this option.    2.  Continue routine low impact exercise.    3.  Return to clinic in 2 weeks for the first in a series of 3 left knee Euflexxa injections.    The above note was completed, in part, with the aid of Dragon dictation software/hardware. Translation errors may be present.

## 2017-08-09 ENCOUNTER — PATIENT MESSAGE (OUTPATIENT)
Dept: PHYSICAL MEDICINE AND REHAB | Facility: CLINIC | Age: 67
End: 2017-08-09

## 2017-08-16 ENCOUNTER — OFFICE VISIT (OUTPATIENT)
Dept: PHYSICAL MEDICINE AND REHAB | Facility: CLINIC | Age: 67
End: 2017-08-16
Payer: MEDICARE

## 2017-08-16 VITALS — WEIGHT: 185 LBS | BODY MASS INDEX: 28.04 KG/M2 | HEIGHT: 68 IN

## 2017-08-16 DIAGNOSIS — M25.561 ACUTE BILATERAL KNEE PAIN: Primary | ICD-10-CM

## 2017-08-16 DIAGNOSIS — M25.562 ACUTE BILATERAL KNEE PAIN: Primary | ICD-10-CM

## 2017-08-16 DIAGNOSIS — M17.0 BILATERAL PRIMARY OSTEOARTHRITIS OF KNEE: ICD-10-CM

## 2017-08-16 PROCEDURE — 99999 PR PBB SHADOW E&M-EST. PATIENT-LVL II: CPT | Mod: PBBFAC,,, | Performed by: PHYSICAL MEDICINE & REHABILITATION

## 2017-08-16 PROCEDURE — 20611 DRAIN/INJ JOINT/BURSA W/US: CPT | Mod: 50,S$GLB,, | Performed by: PHYSICAL MEDICINE & REHABILITATION

## 2017-08-16 PROCEDURE — 99499 UNLISTED E&M SERVICE: CPT | Mod: S$GLB,,, | Performed by: PHYSICAL MEDICINE & REHABILITATION

## 2017-08-16 RX ORDER — HYALURONATE SODIUM 20 MG/2 ML
20 SYRINGE (ML) INTRAARTICULAR
Status: DISCONTINUED | OUTPATIENT
Start: 2017-08-16 | End: 2017-08-16 | Stop reason: HOSPADM

## 2017-08-16 RX ADMIN — Medication 20 MG: at 11:08

## 2017-08-16 NOTE — PROGRESS NOTES
[facility]    CHIEF COMPLAINT:   Chief Complaint   Patient presents with    Injections     euflexxa     HISTORY OF PRESENT ILLNESS: Tali Fitzpatrick is a 67 y.o. female who presents to me in follow-up regarding her bilateral knee pain.  At her last visit we elected to move forward with injection of Euflexxa to both knees to promote intra-articular help.  She returns today for these injections.  She notes no significant change in her overall condition since that visit.    Review of Systems   Constitutional: Negative for fever.   HENT: Negative for drooling.    Eyes: Negative for discharge.   Respiratory: Negative for choking.    Cardiovascular: Negative for chest pain.   Genitourinary: Negative for flank pain.   Skin: Negative for wound.   Allergic/Immunologic: Negative for immunocompromised state.   Neurological: Negative for tremors and syncope.   Psychiatric/Behavioral: Negative for behavioral problems.     Past Medical History:   Past Medical History:   Diagnosis Date    Anxiety and depression     DJD (degenerative joint disease)     bilateral knees   Hyalgan series completed 8/27/14    Hyperlipidemia     Hypertension     Insomnia        Past Surgical History:   Past Surgical History:   Procedure Laterality Date    CATARACT EXTRACTION W/  INTRAOCULAR LENS IMPLANT      left    EYE SURGERY      LIPOSUCTION      TONSILLECTOMY         Family History:   Family History   Problem Relation Age of Onset    Arthritis Mother     Coronary artery disease Mother     Coronary artery disease Father        Medications:   Current Outpatient Prescriptions on File Prior to Visit   Medication Sig Dispense Refill    alprazolam (XANAX) 0.5 MG tablet Take 0.5 mg by mouth nightly as needed.       amlodipine (NORVASC) 5 MG tablet Take 5 mg by mouth once daily.  3    atenolol (TENORMIN) 50 MG tablet       escitalopram oxalate (LEXAPRO) 20 MG tablet Take 20 mg by mouth once daily.       gabapentin  "(NEURONTIN) 300 MG capsule Take 300 mg by mouth once daily.  3    hydrOXYzine (VISTARIL) 50 MG Cap Take 50 mg by mouth every 8 (eight) hours as needed.   6    pravastatin (PRAVACHOL) 20 MG tablet Take 20 mg by mouth once daily.  6    venlafaxine (EFFEXOR) 37.5 MG Tab        No current facility-administered medications on file prior to visit.        Allergies:   Review of patient's allergies indicates:   Allergen Reactions    Micardis [telmisartan]      Can't remember       Social History:   Social History     Social History    Marital status:      Spouse name: N/A    Number of children: N/A    Years of education: N/A     Social History Main Topics    Smoking status: Never Smoker    Smokeless tobacco: Never Used    Alcohol use 0.0 oz/week      Comment: social    Drug use:      Types: Benzodiazepines    Sexual activity: Not Asked     Other Topics Concern    None     Social History Narrative    None     Tali is a retired .    PHYSICAL EXAMINATION:   General    Vitals:    08/16/17 1117   Weight: 83.9 kg (185 lb)   Height: 5' 8" (1.727 m)   VSS  Constitutional: Oriented to person, place, and time. No apparent distress. Appears well-developed and well-nourished. Pleasant.  HENT:   Head: Normocephalic and atraumatic.   Eyes: Right eye exhibits no discharge. Left eye exhibits no discharge. No scleral icterus.   Pulmonary/Chest: Effort normal. No respiratory distress.   Abdominal: There is no guarding.   Neurological: Alert and oriented to person, place, and time.   Psychiatric: Behavior is normal.   Right Knee Exam     Tenderness   The patient is experiencing no tenderness.         Range of Motion   Extension: 0   Flexion: 130     Muscle Strength     The patient has normal right knee strength.    Tests   Mary:  Medial - negative Lateral - negative  Lachman:  Anterior - negative      Varus: negative  Valgus: negative    Other   Erythema: absent  Scars: absent  Sensation: " normal  Pulse: present  Swelling: none  Other tests: no effusion present      Left Knee Exam     Tenderness   The patient is experiencing tenderness in the medial joint line.    Range of Motion   Extension: 0   Flexion: 130     Muscle Strength     The patient has normal left knee strength.    Tests   Mary:  Medial - negative Lateral - negative  Lachman:  Anterior - negative      Varus: negative  Valgus: negative    Other   Erythema: absent  Scars: absent  Sensation: normal  Swelling: none  Effusion: no effusion present        Imaging    AP standing views of both knees as well as lateral and sunrise views of both knees are obtained..  No fracture is identified.  Joint effusion is not seen on either side.  On both sides there is marked narrowing of the medial intercondylar joint space with irregularity of the articular surface of the femur condyle.  There is also narrowing of the lateral intercondylar joint space.  Spur formation of the patella is seen on both sides.   Impression      Moderately severe osteoarthritis, both knees.  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     Data Reviewed: X-ray    Supportive Actions: Independent visualization of images or test specimens    ASSESSMENT:   1. Acute bilateral knee pain    2. Bilateral primary osteoarthritis of knee      PLAN:     1.  We proceeded today with the first in a series of 3 bilateral knee Euflexxa injections without issue.  See separate procedure note for ultrasound-guided injections.    2.  Continue routine low impact exercise.    3.  Return to clinic in 1 week for the second in a series of 3 bilateral knee Euflexxa injections.    The above note was completed, in part, with the aid of Dragon dictation software/hardware. Translation errors may be present.

## 2017-08-16 NOTE — PROCEDURES
Large Joint Aspiration/Injection  Date/Time: 8/16/2017 11:56 AM  Performed by: SACHI AHUMADA  Authorized by: SACHI AHUMADA     Consent Done?:  Yes (Verbal)  Indications:  Pain  Procedure site marked: Yes    Timeout: Prior to procedure the correct patient, procedure, and site was verified      Location:  Knee  Site:  R knee and L knee  Prep: Patient was prepped and draped in usual sterile fashion    Ultrasonic Guidance for needle placement: Yes  Images are saved and documented.  Needle size:  22 G  Approach: Needle in plane, superior-lateral approach.  Medications:  20 mg EUFLEXXA 10 mg/mL(mw 2.4 -3.6 million)  Patient tolerance:  Patient tolerated the procedure well with no immediate complications    Additional Comments: Ultrasound guidance was used for correct needle placement, the images were saved will be uploaded to EMR.

## 2017-08-22 ENCOUNTER — OFFICE VISIT (OUTPATIENT)
Dept: PHYSICAL MEDICINE AND REHAB | Facility: CLINIC | Age: 67
End: 2017-08-22
Payer: MEDICARE

## 2017-08-22 DIAGNOSIS — M17.0 BILATERAL PRIMARY OSTEOARTHRITIS OF KNEE: Primary | ICD-10-CM

## 2017-08-22 PROCEDURE — 99499 UNLISTED E&M SERVICE: CPT | Mod: S$GLB,,, | Performed by: PHYSICAL MEDICINE & REHABILITATION

## 2017-08-22 PROCEDURE — 20611 DRAIN/INJ JOINT/BURSA W/US: CPT | Mod: 50,S$GLB,, | Performed by: PHYSICAL MEDICINE & REHABILITATION

## 2017-08-22 PROCEDURE — 99999 PR PBB SHADOW E&M-EST. PATIENT-LVL I: CPT | Mod: PBBFAC,,, | Performed by: PHYSICAL MEDICINE & REHABILITATION

## 2017-08-22 RX ORDER — HYALURONATE SODIUM 20 MG/2 ML
20 SYRINGE (ML) INTRAARTICULAR
Status: DISCONTINUED | OUTPATIENT
Start: 2017-08-22 | End: 2017-08-22 | Stop reason: HOSPADM

## 2017-08-22 RX ADMIN — Medication 20 MG: at 11:08

## 2017-08-22 NOTE — PROGRESS NOTES
[facility]    CHIEF COMPLAINT:   Chief Complaint   Patient presents with    Injections     bilat euflexxa 2/3     HISTORY OF PRESENT ILLNESS: Tali Fitzpatrick is a 67 y.o. female who presents to me in follow-up regarding her bilateral knee pain.  At her last visit we elected to move forward with injection of Euflexxa 1/3 to both knees to promote intra-articular help.  She returns today for these injections.  She notes reduced pain since that visit.    Review of Systems   Constitutional: Negative for fever.   HENT: Negative for drooling.    Eyes: Negative for discharge.   Respiratory: Negative for choking.    Cardiovascular: Negative for chest pain.   Genitourinary: Negative for flank pain.   Skin: Negative for wound.   Allergic/Immunologic: Negative for immunocompromised state.   Neurological: Negative for tremors and syncope.   Psychiatric/Behavioral: Negative for behavioral problems.     Past Medical History:   Past Medical History:   Diagnosis Date    Anxiety and depression     DJD (degenerative joint disease)     bilateral knees   Hyalgan series completed 8/27/14    Hyperlipidemia     Hypertension     Insomnia        Past Surgical History:   Past Surgical History:   Procedure Laterality Date    CATARACT EXTRACTION W/  INTRAOCULAR LENS IMPLANT      left    EYE SURGERY      LIPOSUCTION      TONSILLECTOMY         Family History:   Family History   Problem Relation Age of Onset    Arthritis Mother     Coronary artery disease Mother     Coronary artery disease Father        Medications:   Current Outpatient Prescriptions on File Prior to Visit   Medication Sig Dispense Refill    alprazolam (XANAX) 0.5 MG tablet Take 0.5 mg by mouth nightly as needed.       amlodipine (NORVASC) 5 MG tablet Take 5 mg by mouth once daily.  3    atenolol (TENORMIN) 50 MG tablet       escitalopram oxalate (LEXAPRO) 20 MG tablet Take 20 mg by mouth once daily.       gabapentin (NEURONTIN) 300 MG capsule  Take 300 mg by mouth once daily.  3    hydrOXYzine (VISTARIL) 50 MG Cap Take 50 mg by mouth every 8 (eight) hours as needed.   6    pravastatin (PRAVACHOL) 20 MG tablet Take 20 mg by mouth once daily.  6    venlafaxine (EFFEXOR) 37.5 MG Tab        No current facility-administered medications on file prior to visit.        Allergies:   Review of patient's allergies indicates:   Allergen Reactions    Micardis [telmisartan]      Can't remember       Social History:   Social History     Social History    Marital status:      Spouse name: N/A    Number of children: N/A    Years of education: N/A     Social History Main Topics    Smoking status: Never Smoker    Smokeless tobacco: Never Used    Alcohol use 0.0 oz/week      Comment: social    Drug use:      Types: Benzodiazepines    Sexual activity: Not Asked     Other Topics Concern    None     Social History Narrative    None     Tali is a retired .    PHYSICAL EXAMINATION:   General  VSS  Constitutional: Oriented to person, place, and time. No apparent distress. Appears well-developed and well-nourished. Pleasant.  HENT:   Head: Normocephalic and atraumatic.   Eyes: Right eye exhibits no discharge. Left eye exhibits no discharge. No scleral icterus.   Pulmonary/Chest: Effort normal. No respiratory distress.   Abdominal: There is no guarding.   Neurological: Alert and oriented to person, place, and time.   Psychiatric: Behavior is normal.   Right Knee Exam     Tenderness   The patient is experiencing no tenderness.         Range of Motion   Extension: 0   Flexion: 130     Muscle Strength     The patient has normal right knee strength.    Tests   Mary:  Medial - negative Lateral - negative  Lachman:  Anterior - negative      Varus: negative  Valgus: negative    Other   Erythema: absent  Scars: absent  Sensation: normal  Pulse: present  Swelling: none  Other tests: no effusion present      Left Knee Exam     Tenderness   The patient  is experiencing tenderness in the medial joint line.    Range of Motion   Extension: 0   Flexion: 130     Muscle Strength     The patient has normal left knee strength.    Tests   Mary:  Medial - negative Lateral - negative  Lachman:  Anterior - negative      Varus: negative  Valgus: negative    Other   Erythema: absent  Scars: absent  Sensation: normal  Swelling: none  Effusion: no effusion present        Imaging    AP standing views of both knees as well as lateral and sunrise views of both knees are obtained..  No fracture is identified.  Joint effusion is not seen on either side.  On both sides there is marked narrowing of the medial intercondylar joint space with irregularity of the articular surface of the femur condyle.  There is also narrowing of the lateral intercondylar joint space.  Spur formation of the patella is seen on both sides.   Impression     Moderately severe osteoarthritis, both knees.  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     Data Reviewed: X-ray    Supportive Actions: Independent visualization of images or test specimens    ASSESSMENT:   1. Bilateral primary osteoarthritis of knee      PLAN:     1.  We proceeded today with the second in a series of 3 bilateral knee Euflexxa injections without issue.  See separate procedure note for ultrasound-guided injections.    2.  Continue routine low impact exercise.    3.  Return to clinic in 1 week for the third in a series of 3 bilateral knee Euflexxa injections.    The above note was completed, in part, with the aid of Dragon dictation software/hardware. Translation errors may be present.

## 2017-08-22 NOTE — PROCEDURES
Large Joint Aspiration/Injection  Date/Time: 8/22/2017 11:10 AM  Performed by: SACHI AHUMADA  Authorized by: SACHI AHUMADA     Consent Done?:  Yes (Verbal)  Indications:  Pain  Procedure site marked: Yes    Timeout: Prior to procedure the correct patient, procedure, and site was verified      Location:  Knee  Site:  R knee and L knee  Prep: Patient was prepped and draped in usual sterile fashion    Ultrasonic Guidance for needle placement: Yes  Images are saved and documented.  Needle size:  22 G  Approach: needle in plane, superolateral.  Medications:  20 mg EUFLEXXA 10 mg/mL(mw 2.4 -3.6 million)  Patient tolerance:  Patient tolerated the procedure well with no immediate complications    Additional Comments: Ultrasound guidance was used for correct needle placement, the images were saved will be uploaded to EMR.

## 2017-08-30 ENCOUNTER — OFFICE VISIT (OUTPATIENT)
Dept: PHYSICAL MEDICINE AND REHAB | Facility: CLINIC | Age: 67
End: 2017-08-30
Payer: MEDICARE

## 2017-08-30 DIAGNOSIS — M17.0 PRIMARY OSTEOARTHRITIS OF BOTH KNEES: Primary | ICD-10-CM

## 2017-08-30 PROCEDURE — 99499 UNLISTED E&M SERVICE: CPT | Mod: S$GLB,,, | Performed by: PHYSICAL MEDICINE & REHABILITATION

## 2017-08-30 PROCEDURE — 99999 PR PBB SHADOW E&M-EST. PATIENT-LVL I: CPT | Mod: PBBFAC,,, | Performed by: PHYSICAL MEDICINE & REHABILITATION

## 2017-08-30 PROCEDURE — 20611 DRAIN/INJ JOINT/BURSA W/US: CPT | Mod: 50,S$GLB,, | Performed by: PHYSICAL MEDICINE & REHABILITATION

## 2017-08-30 RX ORDER — HYALURONATE SODIUM 20 MG/2 ML
20 SYRINGE (ML) INTRAARTICULAR
Status: DISCONTINUED | OUTPATIENT
Start: 2017-08-30 | End: 2017-08-30 | Stop reason: HOSPADM

## 2017-08-30 RX ADMIN — Medication 20 MG: at 11:08

## 2017-08-30 NOTE — PROCEDURES
Large Joint Aspiration/Injection  Date/Time: 8/30/2017 11:54 AM  Performed by: SACHI AHUMADA  Authorized by: SACHI AHUMADA     Consent Done?:  Yes (Verbal)  Indications:  Pain  Procedure site marked: Yes    Timeout: Prior to procedure the correct patient, procedure, and site was verified      Location:  Knee  Site:  R knee and L knee  Prep: Patient was prepped and draped in usual sterile fashion    Ultrasonic Guidance for needle placement: Yes  Images are saved and documented.  Needle size:  22 G  Approach: Needle in plane, superior-lateral approach.  Medications:  20 mg EUFLEXXA 10 mg/mL(mw 2.4 -3.6 million)  Patient tolerance:  Patient tolerated the procedure well with no immediate complications    Additional Comments: Ultrasound guidance was used for correct needle placement, the images were saved will be uploaded to EMR.

## 2017-08-30 NOTE — PROGRESS NOTES
[facility]    CHIEF COMPLAINT:   Chief Complaint   Patient presents with    Injections     Euflexxa #3/3 bilateral     HISTORY OF PRESENT ILLNESS: Tali Fitzpatrick is a 67 y.o. female who presents to me in follow-up regarding her bilateral knee pain.  At her last visit, we performed injection of Euflexxa 2/3 to both knees to promote intra-articular health.  She returns today for these injections.  She notes reduced pain since that visit.    Review of Systems   Constitutional: Negative for fever.   HENT: Negative for drooling.    Eyes: Negative for discharge.   Respiratory: Negative for choking.    Cardiovascular: Negative for chest pain.   Genitourinary: Negative for flank pain.   Skin: Negative for wound.   Allergic/Immunologic: Negative for immunocompromised state.   Neurological: Negative for tremors and syncope.   Psychiatric/Behavioral: Negative for behavioral problems.     Past Medical History:   Past Medical History:   Diagnosis Date    Anxiety and depression     DJD (degenerative joint disease)     bilateral knees   Hyalgan series completed 8/27/14    Hyperlipidemia     Hypertension     Insomnia        Past Surgical History:   Past Surgical History:   Procedure Laterality Date    CATARACT EXTRACTION W/  INTRAOCULAR LENS IMPLANT      left    EYE SURGERY      LIPOSUCTION      TONSILLECTOMY         Family History:   Family History   Problem Relation Age of Onset    Arthritis Mother     Coronary artery disease Mother     Coronary artery disease Father        Medications:   Current Outpatient Prescriptions on File Prior to Visit   Medication Sig Dispense Refill    alprazolam (XANAX) 0.5 MG tablet Take 0.5 mg by mouth nightly as needed.       amlodipine (NORVASC) 5 MG tablet Take 5 mg by mouth once daily.  3    atenolol (TENORMIN) 50 MG tablet       escitalopram oxalate (LEXAPRO) 20 MG tablet Take 20 mg by mouth once daily.       gabapentin (NEURONTIN) 300 MG capsule Take 300  mg by mouth once daily.  3    hydrOXYzine (VISTARIL) 50 MG Cap Take 50 mg by mouth every 8 (eight) hours as needed.   6    pravastatin (PRAVACHOL) 20 MG tablet Take 20 mg by mouth once daily.  6    venlafaxine (EFFEXOR) 37.5 MG Tab        No current facility-administered medications on file prior to visit.        Allergies:   Review of patient's allergies indicates:   Allergen Reactions    Micardis [telmisartan]      Can't remember       Social History:   Social History     Social History    Marital status:      Spouse name: N/A    Number of children: N/A    Years of education: N/A     Social History Main Topics    Smoking status: Never Smoker    Smokeless tobacco: Never Used    Alcohol use 0.0 oz/week      Comment: social    Drug use:      Types: Benzodiazepines    Sexual activity: Not Asked     Other Topics Concern    None     Social History Narrative    None     Tali is a retired .    PHYSICAL EXAMINATION:   General  VSS  Constitutional: Oriented to person, place, and time. No apparent distress. Appears well-developed and well-nourished. Pleasant.  HENT:   Head: Normocephalic and atraumatic.   Eyes: Right eye exhibits no discharge. Left eye exhibits no discharge. No scleral icterus.   Pulmonary/Chest: Effort normal. No respiratory distress.   Abdominal: There is no guarding.   Neurological: Alert and oriented to person, place, and time.   Psychiatric: Behavior is normal.   Right Knee Exam     Tenderness   The patient is experiencing no tenderness.         Range of Motion   Extension: 0   Flexion: 130     Muscle Strength     The patient has normal right knee strength.    Tests   Mary:  Medial - negative Lateral - negative  Lachman:  Anterior - negative      Varus: negative  Valgus: negative    Other   Erythema: absent  Scars: absent  Sensation: normal  Pulse: present  Swelling: none  Other tests: no effusion present      Left Knee Exam     Tenderness   The patient is  experiencing tenderness in the medial joint line.    Range of Motion   Extension: 0   Flexion: 130     Muscle Strength     The patient has normal left knee strength.    Tests   Mary:  Medial - negative Lateral - negative  Lachman:  Anterior - negative      Varus: negative  Valgus: negative    Other   Erythema: absent  Scars: absent  Sensation: normal  Swelling: none  Effusion: no effusion present        Imaging    AP standing views of both knees as well as lateral and sunrise views of both knees are obtained..  No fracture is identified.  Joint effusion is not seen on either side.  On both sides there is marked narrowing of the medial intercondylar joint space with irregularity of the articular surface of the femur condyle.  There is also narrowing of the lateral intercondylar joint space.  Spur formation of the patella is seen on both sides.   Impression     Moderately severe osteoarthritis, both knees.  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     Data Reviewed: X-ray    Supportive Actions: Independent visualization of images or test specimens    ASSESSMENT:   1. Primary osteoarthritis of both knees      PLAN:     1.  We proceeded today with the third in a series of 3 bilateral knee Euflexxa injections without issue.  See separate procedure note for ultrasound-guided injections.    2.  Continue routine low impact exercise.    3.  Return to clinic prn.    The above note was completed, in part, with the aid of Dragon dictation software/hardware. Translation errors may be present.

## 2017-12-13 ENCOUNTER — OFFICE VISIT (OUTPATIENT)
Dept: PHYSICAL MEDICINE AND REHAB | Facility: CLINIC | Age: 67
End: 2017-12-13
Payer: MEDICARE

## 2017-12-13 ENCOUNTER — HOSPITAL ENCOUNTER (OUTPATIENT)
Dept: RADIOLOGY | Facility: HOSPITAL | Age: 67
Discharge: HOME | End: 2017-12-13
Attending: PHYSICAL MEDICINE & REHABILITATION
Payer: MEDICARE

## 2017-12-13 VITALS — BODY MASS INDEX: 28.04 KG/M2 | WEIGHT: 185 LBS | HEIGHT: 68 IN

## 2017-12-13 DIAGNOSIS — M25.562 ACUTE PAIN OF LEFT KNEE: Primary | ICD-10-CM

## 2017-12-13 DIAGNOSIS — G89.29 CHRONIC PAIN OF BOTH KNEES: ICD-10-CM

## 2017-12-13 DIAGNOSIS — M25.562 CHRONIC PAIN OF BOTH KNEES: ICD-10-CM

## 2017-12-13 DIAGNOSIS — M25.561 CHRONIC PAIN OF BOTH KNEES: ICD-10-CM

## 2017-12-13 DIAGNOSIS — M25.562 ACUTE PAIN OF LEFT KNEE: ICD-10-CM

## 2017-12-13 PROCEDURE — 99213 OFFICE O/P EST LOW 20 MIN: CPT | Mod: S$GLB,,, | Performed by: PHYSICAL MEDICINE & REHABILITATION

## 2017-12-13 PROCEDURE — 73562 X-RAY EXAM OF KNEE 3: CPT | Mod: 26,59,RT, | Performed by: RADIOLOGY

## 2017-12-13 PROCEDURE — 73564 X-RAY EXAM KNEE 4 OR MORE: CPT | Mod: 26,LT,, | Performed by: RADIOLOGY

## 2017-12-13 PROCEDURE — 73564 X-RAY EXAM KNEE 4 OR MORE: CPT | Mod: TC,PN,LT

## 2017-12-13 PROCEDURE — 99999 PR PBB SHADOW E&M-EST. PATIENT-LVL II: CPT | Mod: PBBFAC,,, | Performed by: PHYSICAL MEDICINE & REHABILITATION

## 2017-12-13 NOTE — PROGRESS NOTES
[facility]    CHIEF COMPLAINT:   Chief Complaint   Patient presents with    Knee Pain     left knee pain     HISTORY OF PRESENT ILLNESS: Tali Fitzpatrick is a 67 y.o. female who presents to me in follow-up for evaluation of L knee pain. Pt has a Hx of BL severe knee OA (left worse than right), and completed an Euflexxa injection series in August of 2017. Since then, she has had significant improvement in her knee pain, so that she only has occasional mild knee pain, usually in the left. She is much more active and her quality of life has improved. However, on 12/8/17, she was playing outside in the snow with her grandchildren and slipped. She states her foot was planted and her body twisted on her knee. She felt immediate pain and her knee gave way, so that she fell on her bottom. She did not land on her knee, nor did she hear/ feel any cracking or popping sensation. Her pain was severe, so she took 800mg ibuprofen and elevated her knee for the rest of the night. She did not notice any swelling on her left knee until the next morning, where she noticed diffuse swelling around the knee and decreased ROM. Since then, her pain has mildly improved but is still hindering her gait and can be severe at times (10/10), interfering with her daily activities. It is located at the anterior aspect deep within the knee and is sharp in sensation. It is worse when she is walking and she finds is difficult to fully extend her knee. At rest, she has minimal pain. She has been elevating her knee, using a heating pad, and taking ibuprofen, which all help.     Review of Systems   Constitutional: Negative for fever.   HENT: Negative for drooling.    Eyes: Negative for discharge.   Respiratory: Negative for choking.    Cardiovascular: Negative for chest pain.   Genitourinary: Negative for flank pain.   Skin: Negative for wound.   Allergic/Immunologic: Negative for immunocompromised state.   Neurological: Negative for  tremors and syncope.   Psychiatric/Behavioral: Negative for behavioral problems.     Past Medical History:   Past Medical History:   Diagnosis Date    Anxiety and depression     DJD (degenerative joint disease)     bilateral knees   Hyalgan series completed 8/27/14    Hyperlipidemia     Hypertension     Insomnia        Past Surgical History:   Past Surgical History:   Procedure Laterality Date    CATARACT EXTRACTION W/  INTRAOCULAR LENS IMPLANT      left    EYE SURGERY      LIPOSUCTION      TONSILLECTOMY         Family History:   Family History   Problem Relation Age of Onset    Arthritis Mother     Coronary artery disease Mother     Coronary artery disease Father        Medications:   Current Outpatient Prescriptions on File Prior to Visit   Medication Sig Dispense Refill    alprazolam (XANAX) 0.5 MG tablet Take 0.5 mg by mouth nightly as needed.       amlodipine (NORVASC) 5 MG tablet Take 5 mg by mouth once daily.  3    atenolol (TENORMIN) 50 MG tablet       escitalopram oxalate (LEXAPRO) 20 MG tablet Take 20 mg by mouth once daily.       gabapentin (NEURONTIN) 300 MG capsule Take 300 mg by mouth once daily.  3    hydrOXYzine (VISTARIL) 50 MG Cap Take 50 mg by mouth every 8 (eight) hours as needed.   6    pravastatin (PRAVACHOL) 20 MG tablet Take 20 mg by mouth once daily.  6    venlafaxine (EFFEXOR) 37.5 MG Tab        No current facility-administered medications on file prior to visit.      Allergies:   Review of patient's allergies indicates:   Allergen Reactions    Micardis [telmisartan]      Can't remember     Social History:   Social History     Social History    Marital status:      Spouse name: N/A    Number of children: N/A    Years of education: N/A     Social History Main Topics    Smoking status: Never Smoker    Smokeless tobacco: Never Used    Alcohol use 0.0 oz/week      Comment: social    Drug use:      Types: Benzodiazepines    Sexual activity: Not Asked     Other  "Topics Concern    None     Social History Narrative    None     PHYSICAL EXAMINATION:   General    Vitals:    12/13/17 1316   Weight: 83.9 kg (185 lb)   Height: 5' 8" (1.727 m)     Constitutional: Oriented to person, place, and time. No apparent distress. Appears well-developed and well-nourished. Pleasant.  HENT:   Head: Normocephalic and atraumatic.   Eyes: Right eye exhibits no discharge. Left eye exhibits no discharge. No scleral icterus.   Pulmonary/Chest: Effort normal. No respiratory distress.   Abdominal: There is no guarding.   Neurological: Alert and oriented to person, place, and time.   Psychiatric: Behavior is normal.   Right Knee Exam   Right knee exam is normal.    Range of Motion   Extension: 0   Flexion: 130     Other   Other tests: no effusion present    Comments:  Crepitus      Left Knee Exam     Tenderness   The patient is experiencing tenderness in the medial joint line.    Range of Motion   Extension: -5   Flexion: 120     Muscle Strength     The patient has normal left knee strength.    Tests   Mary:  Medial - positive Lateral - negative  Lachman:  Anterior - 1+    Posterior - negative  Drawer:       Anterior - negative     Posterior - negative  Varus: negative  Valgus: negative  Pivot Shift: negative    Other   Erythema: absent  Sensation: normal  Pulse: present  Swelling: none  Effusion: no effusion present    Comments:  Positive patellar grind, Crepitus, Unable to fully extend knee        INSPECTION: There is no swelling, ecchymoses, erythema or gross deformity.  GAIT/DYNAMIC: Antalgic with minimal weight bearing on the left side. Pt does not fully extend left knee.     Imaging  Knee X-rays from 12/13/17 reviewed    Diagnostic Ultrasound (12/13/17): performed and reviewed    Data Reviewed: X-ray, knee ultrasound    Supportive Actions: Independent visualization of images or test specimens    ASSESSMENT:   1. Chronic pain of both knees    2. Severe bilateral knee OA  3. Acute left knee " pain     PLAN:     1. Time was spent reviewing the above diagnosis in depth with Tali today, including acute management and rehabilitation.     2. Her acute left knee pain is likely 2/2 to a medial meniscal tear, given her history, physical exam, and ultrasound results. The U/S is limited in determining the severity of the tear, and thus I will order an MRI to further evaluate the meniscus. If severe, pt will likely need surgery if she is willing to undergo an operation. If minor, she would potentially benefit from a PRP injection, or a more conservative approach including pain control and physical therapy. In the interim, pt was instructed to use PRICE principles, as well as modest NSAID use as needed for pain.    3. For her chronic BL knee pain 2/2 severe OA, pt is still experiencing adequate relief from the Euflexxa injection. These may be repeated in the future as needed.    4. I will contact her with the MRI results and we will formulate a plan at that time.    Patient was initially seen and examined by [facility] PGY-I resident, Dr. ARIEL Hernandez, and then by myself. As the supervising and teaching physician, I personally evaluated and examined the patient and reviewed the resident's physical exam, assessment/plan and agree with the clinic note as written and then edited/addended by myself as above.    The above note was completed, in part, with the aid of Dragon dictation software/hardware. Translation errors may be present.

## 2017-12-15 ENCOUNTER — HOSPITAL ENCOUNTER (OUTPATIENT)
Dept: RADIOLOGY | Facility: HOSPITAL | Age: 67
Discharge: HOME | End: 2017-12-15
Attending: PHYSICAL MEDICINE & REHABILITATION
Payer: MEDICARE

## 2017-12-15 DIAGNOSIS — M25.562 ACUTE PAIN OF LEFT KNEE: ICD-10-CM

## 2017-12-15 PROCEDURE — 73721 MRI JNT OF LWR EXTRE W/O DYE: CPT | Mod: 26,LT,, | Performed by: RADIOLOGY

## 2017-12-15 PROCEDURE — 73721 MRI JNT OF LWR EXTRE W/O DYE: CPT | Mod: TC,LT

## 2018-01-03 ENCOUNTER — OFFICE VISIT (OUTPATIENT)
Dept: ORTHOPEDICS | Facility: CLINIC | Age: 68
End: 2018-01-03
Payer: MEDICARE

## 2018-01-03 VITALS
HEART RATE: 91 BPM | BODY MASS INDEX: 28.04 KG/M2 | DIASTOLIC BLOOD PRESSURE: 79 MMHG | WEIGHT: 185 LBS | HEIGHT: 68 IN | SYSTOLIC BLOOD PRESSURE: 121 MMHG

## 2018-01-03 DIAGNOSIS — M17.0 BILATERAL PRIMARY OSTEOARTHRITIS OF KNEE: Primary | ICD-10-CM

## 2018-01-03 PROCEDURE — 99203 OFFICE O/P NEW LOW 30 MIN: CPT | Mod: 25,S$GLB,, | Performed by: ORTHOPAEDIC SURGERY

## 2018-01-03 PROCEDURE — 99999 PR PBB SHADOW E&M-EST. PATIENT-LVL III: CPT | Mod: PBBFAC,,, | Performed by: ORTHOPAEDIC SURGERY

## 2018-01-03 PROCEDURE — 20610 DRAIN/INJ JOINT/BURSA W/O US: CPT | Mod: LT,S$GLB,, | Performed by: ORTHOPAEDIC SURGERY

## 2018-01-03 RX ORDER — TRIAMCINOLONE ACETONIDE 40 MG/ML
40 INJECTION, SUSPENSION INTRA-ARTICULAR; INTRAMUSCULAR
Status: DISCONTINUED | OUTPATIENT
Start: 2018-01-03 | End: 2018-01-03 | Stop reason: HOSPADM

## 2018-01-03 RX ADMIN — TRIAMCINOLONE ACETONIDE 40 MG: 40 INJECTION, SUSPENSION INTRA-ARTICULAR; INTRAMUSCULAR at 08:01

## 2018-01-03 NOTE — LETTER
January 3, 2018      Deon Ragland MD  [address] [place] [address] [address] [facility]  [facility] [id number]           [facility]  [address] [facility] [address] [place] [address]  Phone: [phone]          Patient: Tali Fitzpatrick   MR Number: [id number]   YOB: 1950   Date of Visit: 1/3/2018       Dear Dr. Deon Ragland:    Thank you for referring Tali Fitzpatrick to me for evaluation. Attached you will find relevant portions of my assessment and plan of care.    If you have questions, please do not hesitate to call me. I look forward to following Tali Fitzpatrick along with you.    Sincerely,    Reed Echevarria MD    Enclosure  CC:  No Recipients    If you would like to receive this communication electronically, please contact [email or web address] or [phone] to request more information on [facility] Link access.    For providers and/or their staff who would like to refer a patient to [facility], please contact us through our one-stop-shop provider referral line, [facility], at [phone].    If you feel you have received this communication in error or would no longer like to receive these types of communications, please e-mail [email or web address]

## 2018-01-03 NOTE — PROGRESS NOTES
Past Medical History:   Diagnosis Date    Anxiety and depression     DJD (degenerative joint disease)     bilateral knees   Hyalgan series completed 8/27/14    Hyperlipidemia     Hypertension     Insomnia        Past Surgical History:   Procedure Laterality Date    CATARACT EXTRACTION W/  INTRAOCULAR LENS IMPLANT      left    EYE SURGERY      LIPOSUCTION      TONSILLECTOMY         Current Outpatient Prescriptions   Medication Sig    alprazolam (XANAX) 0.5 MG tablet Take 0.5 mg by mouth nightly as needed.     amlodipine (NORVASC) 5 MG tablet Take 5 mg by mouth once daily.    atenolol (TENORMIN) 50 MG tablet     escitalopram oxalate (LEXAPRO) 20 MG tablet Take 20 mg by mouth once daily.     gabapentin (NEURONTIN) 300 MG capsule Take 300 mg by mouth once daily.    hydrOXYzine (VISTARIL) 50 MG Cap Take 50 mg by mouth every 8 (eight) hours as needed.     pravastatin (PRAVACHOL) 20 MG tablet Take 20 mg by mouth once daily.    venlafaxine (EFFEXOR) 37.5 MG Tab      No current facility-administered medications for this visit.        Review of patient's allergies indicates:   Allergen Reactions    Micardis [telmisartan]      Can't remember       Family History   Problem Relation Age of Onset    Arthritis Mother     Coronary artery disease Mother     Coronary artery disease Father        Social History     Social History    Marital status:      Spouse name: N/A    Number of children: N/A    Years of education: N/A     Occupational History    Not on file.     Social History Main Topics    Smoking status: Never Smoker    Smokeless tobacco: Never Used    Alcohol use 0.0 oz/week      Comment: social    Drug use: Yes     Types: Benzodiazepines    Sexual activity: Not on file     Other Topics Concern    Not on file     Social History Narrative    No narrative on file       Chief Complaint:   Chief Complaint   Patient presents with    Knee Pain     left knee pain       History of present  illness: 67-year-old female with known bilateral knee arthritis seen in consultation for Dr. Ragland.  Patient has been treated for knee arthritis with previous nerve ablations as well as Euflexxa.  She is actually doing well until she twisted her knee while in this now about a month ago.  Since then she had more pain.  MRI was done which showed a complex medial meniscal tear as well as some loose bodies.  She has pain walking and all around the knee.  Pain today as a 0 out of 10 but up to a 6 out of 10 with activity.      Review of Systems:    Constitution: Negative for chills, fever, and sweats.  Negative for unexplained weight loss.    HENT:  Negative for headaches and blurry vision.    Cardiovascular:Negative for chest pain or irregular heart beat. Negative for hypertension.    Respiratory:  Negative for cough and shortness of breath.    Gastrointestinal: Negative for abdominal pain, heartburn, melena, nausea, and vomitting.    Genitourinary:  Negative bladder incontinence and dysuria.    Musculoskeletal:  See HPI    Neurological: Negative for numbness.    Psychiatric/Behavioral: Negative for depression.  The patient is not nervous/anxious.      Endocrine: Negative for polyuria    Hematologic/Lymphatic: Negative for bleeding problem.  Does not bruise/bleed easily.    Skin: Negative for poor would healing and rash      Physical Examination:    Vital Signs:    Vitals:    01/03/18 0829   BP: 121/79   Pulse: 91       Body mass index is 28.13 kg/m².    This a well-developed, well nourished patient in no acute distress.  They are alert and oriented and cooperative to examination.  Pt. walks without an antalgic gait.      Examination of the left knee shows no rashes or erythema. There are no masses ecchymosis or effusion. Patient has full range of motion from °. Patient is nontender to palpation over lateral joint line and moderately tender to palpation over the medial joint line. Patient has a - Lachman exam,  - anterior drawer exam, and - posterior drawer exam. - Mary's exam. Knee is stable to varus and valgus stress. 5 out of 5 motor strength. Palpable distal pulses. Intact light touch sensation. Negative Patellofemoral crepitus    Examination of the right knee shows no rashes or erythema. There are no masses ecchymosis or effusion. Patient has full range of motion from 0-130°. Patient is nontender to palpation over lateral joint line and nontender to palpation over the medial joint line. Patient has a - Lachman exam, - anterior drawer exam, and - posterior drawer exam. - Mary's exam. Knee is stable to varus and valgus stress. 5 out of 5 motor strength. Palpable distal pulses. Intact light touch sensation. Negative Patellofemoral crepitus    X-rays: X-rays of both knees are ordered and reviewed which show arthritis of both knees with the left more severe than the right.  Medial joint space narrowing.    MRI of the left knee:Severe degenerative change, joint space narrowing, cartilage loss medial compartment of the left knee.  Tear of the medial meniscus.  Loose bodies in the joint.  Chondromalacia patella.     Assessment:: Severe left knee arthritis    Plan:  I reviewed the MRI with her today.  I do not think that the meniscal tear is new.  She has more bony edema within her medial compartment.  I think this is causing the increase in her pain.  I would not recommend any kind of arthroscopic procedure.  I did recommend a cortisone injection and hopefully settle her knee down.  The only surgery that would really benefit her would be a total knee replacement.    This note was created using Dragon voice recognition software that occasionally misinterpreted phrases or words.    Consult note is delivered via Epic messaging service.

## 2018-01-03 NOTE — PROCEDURES
Large Joint Aspiration/Injection  Date/Time: 1/3/2018 8:54 AM  Performed by: CHAPIS PAGE  Authorized by: CHAPIS PAGE     Consent Done?:  Yes (Verbal)  Indications:  Pain  Procedure site marked: Yes    Timeout: Prior to procedure the correct patient, procedure, and site was verified      Location:  Knee  Site:  L knee  Prep: Patient was prepped and draped in usual sterile fashion    Needle size:  20 G  Approach:  Anterolateral  Medications:  40 mg triamcinolone acetonide 40 mg/mL  Patient tolerance:  Patient tolerated the procedure well with no immediate complications

## 2018-10-09 ENCOUNTER — OFFICE VISIT (OUTPATIENT)
Dept: URGENT CARE | Facility: CLINIC | Age: 68
End: 2018-10-09
Payer: MEDICARE

## 2018-10-09 VITALS
TEMPERATURE: 99 F | DIASTOLIC BLOOD PRESSURE: 64 MMHG | OXYGEN SATURATION: 99 % | WEIGHT: 189.63 LBS | BODY MASS INDEX: 28.74 KG/M2 | HEIGHT: 68 IN | HEART RATE: 89 BPM | SYSTOLIC BLOOD PRESSURE: 148 MMHG | RESPIRATION RATE: 14 BRPM

## 2018-10-09 DIAGNOSIS — H61.20 IMPACTED CERUMEN, UNSPECIFIED LATERALITY: ICD-10-CM

## 2018-10-09 DIAGNOSIS — G62.9 NEUROPATHY: ICD-10-CM

## 2018-10-09 DIAGNOSIS — F32.A DEPRESSION, UNSPECIFIED DEPRESSION TYPE: ICD-10-CM

## 2018-10-09 DIAGNOSIS — J01.00 ACUTE NON-RECURRENT MAXILLARY SINUSITIS: Primary | ICD-10-CM

## 2018-10-09 DIAGNOSIS — R05.9 COUGH: ICD-10-CM

## 2018-10-09 DIAGNOSIS — I10 HYPERTENSION, UNSPECIFIED TYPE: ICD-10-CM

## 2018-10-09 PROCEDURE — 69209 REMOVE IMPACTED EAR WAX UNI: CPT | Mod: PBBFAC,PO,50 | Performed by: NURSE PRACTITIONER

## 2018-10-09 PROCEDURE — 1101F PT FALLS ASSESS-DOCD LE1/YR: CPT | Mod: CPTII,,, | Performed by: NURSE PRACTITIONER

## 2018-10-09 PROCEDURE — 99213 OFFICE O/P EST LOW 20 MIN: CPT | Mod: S$PBB,25,, | Performed by: NURSE PRACTITIONER

## 2018-10-09 PROCEDURE — 69210 REMOVE IMPACTED EAR WAX UNI: CPT | Mod: PBBFAC,PO | Performed by: NURSE PRACTITIONER

## 2018-10-09 PROCEDURE — 99215 OFFICE O/P EST HI 40 MIN: CPT | Mod: PBBFAC,PO | Performed by: NURSE PRACTITIONER

## 2018-10-09 PROCEDURE — 99999 PR PBB SHADOW E&M-EST. PATIENT-LVL V: CPT | Mod: PBBFAC,,, | Performed by: NURSE PRACTITIONER

## 2018-10-09 RX ORDER — PREDNISONE 20 MG/1
20 TABLET ORAL DAILY
Qty: 4 TABLET | Refills: 0 | Status: SHIPPED | OUTPATIENT
Start: 2018-10-09 | End: 2018-10-13

## 2018-10-09 RX ORDER — CITALOPRAM 20 MG/1
20 TABLET, FILM COATED ORAL DAILY
Qty: 30 TABLET | Refills: 1 | Status: SHIPPED | OUTPATIENT
Start: 2018-10-09 | End: 2018-11-06

## 2018-10-09 RX ORDER — CITALOPRAM 20 MG/1
20 TABLET, FILM COATED ORAL DAILY
COMMUNITY
End: 2018-10-09 | Stop reason: SDUPTHER

## 2018-10-09 RX ORDER — GABAPENTIN 300 MG/1
300 CAPSULE ORAL DAILY
Qty: 30 CAPSULE | Refills: 1 | Status: SHIPPED | OUTPATIENT
Start: 2018-10-09 | End: 2018-11-06

## 2018-10-09 RX ORDER — AMOXICILLIN AND CLAVULANATE POTASSIUM 875; 125 MG/1; MG/1
1 TABLET, FILM COATED ORAL EVERY 12 HOURS
Qty: 20 TABLET | Refills: 0 | Status: SHIPPED | OUTPATIENT
Start: 2018-10-09 | End: 2018-11-06 | Stop reason: ALTCHOICE

## 2018-10-09 RX ORDER — PRAVASTATIN SODIUM 20 MG/1
20 TABLET ORAL DAILY
Qty: 30 TABLET | Refills: 2 | Status: SHIPPED | OUTPATIENT
Start: 2018-10-09 | End: 2018-11-06 | Stop reason: SDUPTHER

## 2018-10-09 NOTE — PROGRESS NOTES
Subjective:       Patient ID: Tali Fitzpatrick is a 68 y.o. female.    Chief Complaint: Dizziness and Sinus Problem (x one week )    68 year old presents to Urgent Care with reports of chest congestion and dizziness. Denies any other problems or concerns at this time. Patient is new to [facility] and looking for Primary Care Provider for chronic medical conditions. Additionally she reports that she has been out of her medications.       Sinus Problem   This is a new problem. The current episode started 1 to 4 weeks ago. The problem has been gradually worsening since onset. There has been no fever. Her pain is at a severity of 3/10. The pain is mild. Associated symptoms include headaches, shortness of breath and sinus pressure. Pertinent negatives include no chills, ear pain or sore throat. (Dizziness) Past treatments include nothing.     Review of Systems   Constitutional: Negative for appetite change, chills and fever.   HENT: Positive for sinus pressure and sinus pain. Negative for ear pain, sore throat and trouble swallowing.    Eyes: Negative for visual disturbance.   Respiratory: Positive for shortness of breath.    Cardiovascular: Negative for chest pain.   Gastrointestinal: Negative for abdominal pain, diarrhea, nausea and vomiting.   Endocrine: Negative for cold intolerance, polyphagia and polyuria.   Genitourinary: Negative for decreased urine volume and dysuria.   Musculoskeletal: Negative for back pain.   Skin: Negative for rash.   Allergic/Immunologic: Negative for environmental allergies and food allergies.   Neurological: Positive for dizziness and headaches. Negative for tremors, weakness and numbness.   Hematological: Does not bruise/bleed easily.   Psychiatric/Behavioral: Negative for confusion and hallucinations. The patient is not nervous/anxious and is not hyperactive.    All other systems reviewed and are negative.      Objective:     Physical Exam   Constitutional: She is oriented to person, place,  and time. She appears well-developed and well-nourished.   HENT:   Head: Normocephalic and atraumatic.   Right Ear: External ear normal.   Left Ear: External ear normal.   Nose: Right sinus exhibits maxillary sinus tenderness. Left sinus exhibits maxillary sinus tenderness.   Mouth/Throat: Uvula is midline. Posterior oropharyngeal erythema present.   Eyes: Conjunctivae and EOM are normal. Pupils are equal, round, and reactive to light.   Neck: Normal range of motion. Neck supple.   Cardiovascular: Normal rate, regular rhythm, normal heart sounds and intact distal pulses.   No murmur heard.  Pulmonary/Chest: Effort normal and breath sounds normal. She has no wheezes.   Abdominal: Soft. Bowel sounds are normal. There is no tenderness.   Musculoskeletal: Normal range of motion.   Neurological: She is alert and oriented to person, place, and time. She has normal reflexes.   Skin: Skin is warm and dry. No rash noted.   Psychiatric: She has a normal mood and affect. Her behavior is normal. Judgment and thought content normal.   Nursing note and vitals reviewed.      Removed large cerumen from each ears. Tolerated well.  Obtained verbal and written consent for Cerumen Impaction Removal. Cerumen was removed by irrigation with warm water. Patient tolerated procedure well. Instructions for home care to prevent wax buildup are given.  Assessment:     1. Acute non-recurrent maxillary sinusitis    2. Cough    3. Neuropathy    4. Depression, unspecified depression type    5. Hypertension, unspecified type      Plan:   Tali was seen today for dizziness and sinus problem.    Diagnoses and all orders for this visit:    Acute non-recurrent maxillary sinusitis    Cough    Neuropathy  -     Ambulatory referral to Internal Medicine    Depression, unspecified depression type  -     Ambulatory referral to Internal Medicine    Hypertension, unspecified type    Other orders  -     amoxicillin-clavulanate 875-125mg (AUGMENTIN) 875-125 mg  per tablet; Take 1 tablet by mouth every 12 (twelve) hours.  -     predniSONE (DELTASONE) 20 MG tablet; Take 1 tablet (20 mg total) by mouth once daily. for 4 days  -     gabapentin (NEURONTIN) 300 MG capsule; Take 1 capsule (300 mg total) by mouth once daily.  -     citalopram (CELEXA) 20 MG tablet; Take 1 tablet (20 mg total) by mouth once daily.  -     pravastatin (PRAVACHOL) 20 MG tablet; Take 1 tablet (20 mg total) by mouth once daily.

## 2018-10-09 NOTE — PATIENT INSTRUCTIONS
Sinusitis (Antibiotic Treatment)    The sinuses are air-filled spaces within the bones of the face. They connect to the inside of the nose. Sinusitis is an inflammation of the tissue lining the sinus cavity. Sinus inflammation can occur during a cold. It can also be due to allergies to pollens and other particles in the air. Sinusitis can cause symptoms of sinus congestion and fullness. A sinus infection causes fever, headache and facial pain. There is often green or yellow drainage from the nose or into the back of the throat (post-nasal drip). You have been given antibiotics to treat this condition.  Home care:  · Take the full course of antibiotics as instructed. Do not stop taking them, even if you feel better.  · Drink plenty of water, hot tea, and other liquids. This may help thin mucus. It also may promote sinus drainage.  · Heat may help soothe painful areas of the face. Use a towel soaked in hot water. Or,  the shower and direct the hot spray onto your face. Using a vaporizer along with a menthol rub at night may also help.   · An expectorant containing guaifenesin may help thin the mucus and promote drainage from the sinuses.  · Over-the-counter decongestants may be used unless a similar medicine was prescribed. Nasal sprays work the fastest. Use one that contains phenylephrine or oxymetazoline. First blow the nose gently. Then use the spray. Do not use these medicines more often than directed on the label or symptoms may get worse. You may also use tablets containing pseudoephedrine. Avoid products that combine ingredients, because side effects may be increased. Read labels. You can also ask the pharmacist for help. (NOTE: Persons with high blood pressure should not use decongestants. They can raise blood pressure.)  · Over-the-counter antihistamines may help if allergies contributed to your sinusitis.    · Do not use nasal rinses or irrigation during an acute sinus infection, unless told to by  your health care provider. Rinsing may spread the infection to other sinuses.  · Use acetaminophen or ibuprofen to control pain, unless another pain medicine was prescribed. (If you have chronic liver or kidney disease or ever had a stomach ulcer, talk with your doctor before using these medicines. Aspirin should never be used in anyone under 18 years of age who is ill with a fever. It may cause severe liver damage.)  · Don't smoke. This can worsen symptoms.  Follow-up care  Follow up with your healthcare provider or our staff if you are not improving within the next week.  When to seek medical advice  Call your healthcare provider if any of these occur:  · Facial pain or headache becoming more severe  · Stiff neck  · Unusual drowsiness or confusion  · Swelling of the forehead or eyelids  · Vision problems, including blurred or double vision  · Fever of 100.4ºF (38ºC) or higher, or as directed by your healthcare provider  · Seizure  · Breathing problems  · Symptoms not resolving within 10 days  Date Last Reviewed: 4/13/2015  © 6577-2344 [employer]. [address] [place] [address], [place] [address]. All rights reserved. This information is not intended as a substitute for professional medical care. Always follow your healthcare professional's instructions.

## 2018-11-06 ENCOUNTER — LAB VISIT (OUTPATIENT)
Dept: LAB | Facility: HOSPITAL | Age: 68
End: 2018-11-06
Attending: FAMILY MEDICINE
Payer: MEDICARE

## 2018-11-06 ENCOUNTER — OFFICE VISIT (OUTPATIENT)
Dept: INTERNAL MEDICINE | Facility: CLINIC | Age: 68
End: 2018-11-06
Payer: MEDICARE

## 2018-11-06 VITALS
TEMPERATURE: 98 F | BODY MASS INDEX: 28.46 KG/M2 | HEART RATE: 74 BPM | SYSTOLIC BLOOD PRESSURE: 102 MMHG | DIASTOLIC BLOOD PRESSURE: 78 MMHG | WEIGHT: 187.81 LBS | HEIGHT: 68 IN

## 2018-11-06 DIAGNOSIS — E78.5 HYPERLIPIDEMIA, UNSPECIFIED HYPERLIPIDEMIA TYPE: ICD-10-CM

## 2018-11-06 DIAGNOSIS — Z11.59 NEED FOR HEPATITIS C SCREENING TEST: ICD-10-CM

## 2018-11-06 DIAGNOSIS — I10 HYPERTENSION, UNSPECIFIED TYPE: ICD-10-CM

## 2018-11-06 DIAGNOSIS — E55.9 VITAMIN D DEFICIENCY: ICD-10-CM

## 2018-11-06 DIAGNOSIS — F32.A ANXIETY AND DEPRESSION: ICD-10-CM

## 2018-11-06 DIAGNOSIS — I10 HYPERTENSION, UNSPECIFIED TYPE: Primary | ICD-10-CM

## 2018-11-06 DIAGNOSIS — Z91.09 ENVIRONMENTAL ALLERGIES: ICD-10-CM

## 2018-11-06 DIAGNOSIS — Z13.820 OSTEOPOROSIS SCREENING: ICD-10-CM

## 2018-11-06 DIAGNOSIS — F41.9 ANXIETY AND DEPRESSION: ICD-10-CM

## 2018-11-06 LAB
25(OH)D3+25(OH)D2 SERPL-MCNC: 34 NG/ML
ALBUMIN SERPL BCP-MCNC: 3.7 G/DL
ALP SERPL-CCNC: 58 U/L
ALT SERPL W/O P-5'-P-CCNC: 15 U/L
ANION GAP SERPL CALC-SCNC: 8 MMOL/L
AST SERPL-CCNC: 18 U/L
BASOPHILS # BLD AUTO: 0.04 K/UL
BASOPHILS NFR BLD: 1 %
BILIRUB SERPL-MCNC: 0.3 MG/DL
BUN SERPL-MCNC: 22 MG/DL
CALCIUM SERPL-MCNC: 9.6 MG/DL
CHLORIDE SERPL-SCNC: 108 MMOL/L
CHOLEST SERPL-MCNC: 233 MG/DL
CHOLEST/HDLC SERPL: 3.5 {RATIO}
CO2 SERPL-SCNC: 25 MMOL/L
CREAT SERPL-MCNC: 1.4 MG/DL
DIFFERENTIAL METHOD: ABNORMAL
EOSINOPHIL # BLD AUTO: 0.1 K/UL
EOSINOPHIL NFR BLD: 2.6 %
ERYTHROCYTE [DISTWIDTH] IN BLOOD BY AUTOMATED COUNT: 17.1 %
EST. GFR  (AFRICAN AMERICAN): 43.9 ML/MIN/1.73 M^2
EST. GFR  (NON AFRICAN AMERICAN): 38.1 ML/MIN/1.73 M^2
GLUCOSE SERPL-MCNC: 88 MG/DL
HCT VFR BLD AUTO: 41.2 %
HDLC SERPL-MCNC: 67 MG/DL
HDLC SERPL: 28.8 %
HGB BLD-MCNC: 12.5 G/DL
IMM GRANULOCYTES # BLD AUTO: 0.01 K/UL
IMM GRANULOCYTES NFR BLD AUTO: 0.2 %
LDLC SERPL CALC-MCNC: 144.8 MG/DL
LYMPHOCYTES # BLD AUTO: 1.5 K/UL
LYMPHOCYTES NFR BLD: 35.9 %
MCH RBC QN AUTO: 25 PG
MCHC RBC AUTO-ENTMCNC: 30.3 G/DL
MCV RBC AUTO: 82 FL
MONOCYTES # BLD AUTO: 0.3 K/UL
MONOCYTES NFR BLD: 7.2 %
NEUTROPHILS # BLD AUTO: 2.2 K/UL
NEUTROPHILS NFR BLD: 53.1 %
NONHDLC SERPL-MCNC: 166 MG/DL
NRBC BLD-RTO: 0 /100 WBC
PLATELET # BLD AUTO: 244 K/UL
PMV BLD AUTO: 11.5 FL
POTASSIUM SERPL-SCNC: 4.2 MMOL/L
PROT SERPL-MCNC: 7.6 G/DL
RBC # BLD AUTO: 5 M/UL
SODIUM SERPL-SCNC: 141 MMOL/L
TRIGL SERPL-MCNC: 106 MG/DL
TSH SERPL DL<=0.005 MIU/L-ACNC: 1.9 UIU/ML
WBC # BLD AUTO: 4.18 K/UL

## 2018-11-06 PROCEDURE — 84443 ASSAY THYROID STIM HORMONE: CPT

## 2018-11-06 PROCEDURE — 80061 LIPID PANEL: CPT

## 2018-11-06 PROCEDURE — 99999 PR PBB SHADOW E&M-EST. PATIENT-LVL IV: CPT | Mod: PBBFAC,,, | Performed by: FAMILY MEDICINE

## 2018-11-06 PROCEDURE — 86803 HEPATITIS C AB TEST: CPT

## 2018-11-06 PROCEDURE — 3288F FALL RISK ASSESSMENT DOCD: CPT | Mod: CPTII,S$GLB,, | Performed by: FAMILY MEDICINE

## 2018-11-06 PROCEDURE — 90670 PCV13 VACCINE IM: CPT | Mod: S$GLB,,, | Performed by: FAMILY MEDICINE

## 2018-11-06 PROCEDURE — 1100F PTFALLS ASSESS-DOCD GE2>/YR: CPT | Mod: CPTII,S$GLB,, | Performed by: FAMILY MEDICINE

## 2018-11-06 PROCEDURE — G0009 ADMIN PNEUMOCOCCAL VACCINE: HCPCS | Mod: S$GLB,,, | Performed by: FAMILY MEDICINE

## 2018-11-06 PROCEDURE — 85025 COMPLETE CBC W/AUTO DIFF WBC: CPT

## 2018-11-06 PROCEDURE — 82306 VITAMIN D 25 HYDROXY: CPT

## 2018-11-06 PROCEDURE — 99214 OFFICE O/P EST MOD 30 MIN: CPT | Mod: 25,S$GLB,, | Performed by: FAMILY MEDICINE

## 2018-11-06 PROCEDURE — 80053 COMPREHEN METABOLIC PANEL: CPT

## 2018-11-06 PROCEDURE — 36415 COLL VENOUS BLD VENIPUNCTURE: CPT | Mod: PO

## 2018-11-06 RX ORDER — AMLODIPINE BESYLATE 5 MG/1
5 TABLET ORAL DAILY
Qty: 90 TABLET | Refills: 3 | Status: SHIPPED | OUTPATIENT
Start: 2018-11-06 | End: 2019-05-17 | Stop reason: SDUPTHER

## 2018-11-06 RX ORDER — ATENOLOL 50 MG/1
50 TABLET ORAL DAILY
Qty: 90 TABLET | Refills: 3 | Status: SHIPPED | OUTPATIENT
Start: 2018-11-06 | End: 2019-05-17 | Stop reason: SDUPTHER

## 2018-11-06 RX ORDER — CITALOPRAM 40 MG/1
40 TABLET, FILM COATED ORAL DAILY
Qty: 90 TABLET | Refills: 3 | Status: SHIPPED | OUTPATIENT
Start: 2018-11-06 | End: 2019-05-17 | Stop reason: SDUPTHER

## 2018-11-06 RX ORDER — ALPRAZOLAM 1 MG/1
1 TABLET ORAL DAILY
Refills: 2 | COMMUNITY
Start: 2018-08-24 | End: 2018-11-06 | Stop reason: DRUGHIGH

## 2018-11-06 RX ORDER — ALPRAZOLAM 0.5 MG/1
0.5 TABLET ORAL NIGHTLY PRN
Qty: 30 TABLET | Refills: 5 | Status: SHIPPED | OUTPATIENT
Start: 2018-11-06 | End: 2019-05-17 | Stop reason: SDUPTHER

## 2018-11-06 RX ORDER — PRAVASTATIN SODIUM 20 MG/1
20 TABLET ORAL DAILY
Qty: 90 TABLET | Refills: 3 | Status: SHIPPED | OUTPATIENT
Start: 2018-11-06 | End: 2019-05-17 | Stop reason: SDUPTHER

## 2018-11-06 NOTE — PROGRESS NOTES
"Subjective:      Patient ID: Tali Fitzpatrick is a 68 y.o. female.    Chief Complaint: Establish Care (nasal congestion )    HPI  69 yo female here to establish care.  Live in [place].  Comes here locally to care for grandkids during the week and then home on weekends.  Reports she has lot's of anxiety.    She is on meds for BP/lipids.  fam hx of heart disease, no personal heart issues.    No DM  Not a smoker  Having allergies/congestion.  Asking about what to take regularly to help this.  Is on neurontin, but states no hx of neuropathy.  She thought it was for anxiety.  Does make her sleepy.    Past Medical History:   Diagnosis Date    Anxiety and depression     DJD (degenerative joint disease)     bilateral knees   Hyalgan series completed 8/27/14    Hyperlipidemia     Hypertension     Insomnia      Family History   Problem Relation Age of Onset    Arthritis Mother     Coronary artery disease Mother     Coronary artery disease Father     Colon cancer Neg Hx     Breast cancer Neg Hx     Diabetes Neg Hx      Past Surgical History:   Procedure Laterality Date    CATARACT EXTRACTION W/  INTRAOCULAR LENS IMPLANT      left    cooled radiofrequency ablation to the genicular nerves bilateral knees Bilateral 7/14/2017    [facility]    diagnsotic block of the genicular branches ti the bilateral knees Bilateral 6/30/2017    [facility] at [facility]    EYE SURGERY      LIPOSUCTION      TONSILLECTOMY       Social History     Tobacco Use    Smoking status: Never Smoker    Smokeless tobacco: Never Used   Substance Use Topics    Alcohol use: Yes     Alcohol/week: 0.0 oz     Comment: social    Drug use: Yes     Types: Benzodiazepines       /78 (BP Location: Right arm, Patient Position: Sitting, BP Method: Large (Manual))   Pulse 74   Temp 98.1 °F (36.7 °C) (Tympanic)   Ht 5' 8" (1.727 m)   Wt 85.2 kg (187 lb 13.3 oz)   BMI 28.56 kg/m² "     Review of Systems   Constitutional: Positive for fatigue. Negative for activity change, appetite change, chills, diaphoresis, fever and unexpected weight change.   HENT: Positive for congestion, postnasal drip and sneezing. Negative for ear pain, hearing loss, rhinorrhea and tinnitus.    Eyes: Negative for visual disturbance.   Respiratory: Negative for cough, shortness of breath and wheezing.    Cardiovascular: Negative for chest pain, palpitations and leg swelling.   Gastrointestinal: Negative for abdominal distention, abdominal pain, constipation and diarrhea.   Genitourinary: Negative for dysuria, frequency, hematuria and urgency.   Musculoskeletal: Negative for back pain and joint swelling.   Neurological: Negative for weakness and headaches.   Hematological: Negative for adenopathy.   Psychiatric/Behavioral: Negative for confusion and decreased concentration. The patient is nervous/anxious.        Objective:     Physical Exam   Constitutional: She is oriented to person, place, and time. She appears well-developed and well-nourished. No distress.   HENT:   Right Ear: External ear normal.   Left Ear: External ear normal.   Mouth/Throat: Oropharynx is clear and moist.   Swollen nasal turbinates   Eyes: Conjunctivae are normal. Pupils are equal, round, and reactive to light.   Neck: Normal range of motion. Neck supple. Carotid bruit is not present. No thyromegaly present.   Cardiovascular: Normal rate, regular rhythm and normal heart sounds. Exam reveals no gallop.   No murmur heard.  Pulmonary/Chest: Effort normal and breath sounds normal. No respiratory distress. She has no wheezes. She has no rales.   Abdominal: Soft. Bowel sounds are normal. She exhibits no distension. There is no tenderness. There is no guarding.   Musculoskeletal: She exhibits no edema.   Lymphadenopathy:     She has no cervical adenopathy.   Neurological: She is alert and oriented to person, place, and time. No cranial nerve deficit.    Skin: Skin is warm and dry. No rash noted.   Psychiatric: She has a normal mood and affect. Her behavior is normal. Judgment and thought content normal.   Nursing note and vitals reviewed.      No results found for: WBC, HGB, HCT, PLT, CHOL, TRIG, HDL, LDLDIRECT, ALT, AST, NA, K, CL, CREATININE, BUN, CO2, TSH, PSA, INR, GLUF, HGBA1C, MICROALBUR    Assessment:     1. Hypertension, unspecified type    2. Hyperlipidemia, unspecified hyperlipidemia type    3. Anxiety and depression    4. Environmental allergies    5. Osteoporosis screening    6. Vitamin D deficiency    7. Need for hepatitis C screening test         Plan:     Hypertension, unspecified type  -     CBC auto differential; Future; Expected date: 11/06/2018    Hyperlipidemia, unspecified hyperlipidemia type  -     Comprehensive metabolic panel; Future; Expected date: 11/06/2018  -     Lipid panel; Future; Expected date: 11/06/2018  -     TSH; Future; Expected date: 11/06/2018    Anxiety and depression    Environmental allergies    Osteoporosis screening  -     DXA Bone Density Spine And Hip; Future; Expected date: 11/06/2018    Vitamin D deficiency  -     Vitamin D; Future; Expected date: 11/06/2018    Need for hepatitis C screening test  -     Hepatitis C antibody; Future; Expected date: 11/06/2018    Other orders  -     citalopram (CELEXA) 40 MG tablet; Take 1 tablet (40 mg total) by mouth once daily.  Dispense: 90 tablet; Refill: 3  -     amLODIPine (NORVASC) 5 MG tablet; Take 1 tablet (5 mg total) by mouth once daily.  Dispense: 90 tablet; Refill: 3  -     atenolol (TENORMIN) 50 MG tablet; Take 1 tablet (50 mg total) by mouth once daily.  Dispense: 90 tablet; Refill: 3  -     pravastatin (PRAVACHOL) 20 MG tablet; Take 1 tablet (20 mg total) by mouth once daily.  Dispense: 90 tablet; Refill: 3  -     (In Office Administered) Pneumococcal Conjugate Vaccine (13 Valent) (IM)  -     ALPRAZolam (XANAX) 0.5 MG tablet; Take 1 tablet (0.5 mg total) by mouth  nightly as needed.  Dispense: 30 tablet; Refill: 5    increase Celexa to 40mg daily.  Stop the neurontin  Reduce Xanax to 0.5mg nightly, explained that these meds are not good for her age/taking chronically.  She was upset initially, but explained that increasing her celexa should help with the daily anxiety.  Med refills today  Update labs  Prevnar 13 today  Declines flu shot  Refuses Mammo  Update DEXA  F/u likely 6 mos

## 2018-11-07 ENCOUNTER — APPOINTMENT (OUTPATIENT)
Dept: RADIOLOGY | Facility: HOSPITAL | Age: 68
End: 2018-11-07
Attending: FAMILY MEDICINE
Payer: MEDICARE

## 2018-11-07 DIAGNOSIS — Z13.820 OSTEOPOROSIS SCREENING: ICD-10-CM

## 2018-11-07 LAB — HCV AB SERPL QL IA: NEGATIVE

## 2018-11-07 PROCEDURE — 77080 DXA BONE DENSITY AXIAL: CPT | Mod: 26,GA,, | Performed by: RADIOLOGY

## 2018-11-07 PROCEDURE — 77080 DXA BONE DENSITY AXIAL: CPT | Mod: GA,TC

## 2019-02-24 ENCOUNTER — OFFICE VISIT (OUTPATIENT)
Dept: URGENT CARE | Facility: CLINIC | Age: 69
End: 2019-02-24
Payer: MEDICARE

## 2019-02-24 ENCOUNTER — HOSPITAL ENCOUNTER (OUTPATIENT)
Dept: RADIOLOGY | Facility: HOSPITAL | Age: 69
Discharge: HOME | End: 2019-02-24
Attending: NURSE PRACTITIONER
Payer: MEDICARE

## 2019-02-24 VITALS
HEIGHT: 68 IN | DIASTOLIC BLOOD PRESSURE: 86 MMHG | OXYGEN SATURATION: 99 % | BODY MASS INDEX: 28.57 KG/M2 | SYSTOLIC BLOOD PRESSURE: 150 MMHG | WEIGHT: 188.5 LBS | TEMPERATURE: 96 F | RESPIRATION RATE: 20 BRPM | HEART RATE: 75 BPM

## 2019-02-24 DIAGNOSIS — R53.83 FATIGUE, UNSPECIFIED TYPE: ICD-10-CM

## 2019-02-24 DIAGNOSIS — R05.9 COUGH: ICD-10-CM

## 2019-02-24 DIAGNOSIS — M79.10 MYALGIA: ICD-10-CM

## 2019-02-24 DIAGNOSIS — J06.9 UPPER RESPIRATORY TRACT INFECTION, UNSPECIFIED TYPE: Primary | ICD-10-CM

## 2019-02-24 LAB
CTP QC/QA: YES
POC MOLECULAR INFLUENZA A AGN: NEGATIVE
POC MOLECULAR INFLUENZA B AGN: NEGATIVE

## 2019-02-24 PROCEDURE — 3079F PR MOST RECENT DIASTOLIC BLOOD PRESSURE 80-89 MM HG: ICD-10-PCS | Mod: CPTII,S$GLB,, | Performed by: NURSE PRACTITIONER

## 2019-02-24 PROCEDURE — 71046 XR CHEST PA AND LATERAL: ICD-10-PCS | Mod: 26,,, | Performed by: RADIOLOGY

## 2019-02-24 PROCEDURE — 3077F PR MOST RECENT SYSTOLIC BLOOD PRESSURE >= 140 MM HG: ICD-10-PCS | Mod: CPTII,S$GLB,, | Performed by: NURSE PRACTITIONER

## 2019-02-24 PROCEDURE — 3079F DIAST BP 80-89 MM HG: CPT | Mod: CPTII,S$GLB,, | Performed by: NURSE PRACTITIONER

## 2019-02-24 PROCEDURE — 99999 PR PBB SHADOW E&M-EST. PATIENT-LVL IV: ICD-10-PCS | Mod: PBBFAC,,, | Performed by: NURSE PRACTITIONER

## 2019-02-24 PROCEDURE — 99214 PR OFFICE/OUTPT VISIT, EST, LEVL IV, 30-39 MIN: ICD-10-PCS | Mod: S$GLB,,, | Performed by: NURSE PRACTITIONER

## 2019-02-24 PROCEDURE — 1101F PT FALLS ASSESS-DOCD LE1/YR: CPT | Mod: CPTII,S$GLB,, | Performed by: NURSE PRACTITIONER

## 2019-02-24 PROCEDURE — 93005 EKG 12-LEAD: ICD-10-PCS | Mod: S$GLB,,, | Performed by: NURSE PRACTITIONER

## 2019-02-24 PROCEDURE — 71046 X-RAY EXAM CHEST 2 VIEWS: CPT | Mod: TC,FY,PO

## 2019-02-24 PROCEDURE — 71046 X-RAY EXAM CHEST 2 VIEWS: CPT | Mod: 26,,, | Performed by: RADIOLOGY

## 2019-02-24 PROCEDURE — 1101F PR PT FALLS ASSESS DOC 0-1 FALLS W/OUT INJ PAST YR: ICD-10-PCS | Mod: CPTII,S$GLB,, | Performed by: NURSE PRACTITIONER

## 2019-02-24 PROCEDURE — 87502 INFLUENZA DNA AMP PROBE: CPT | Mod: QW,S$GLB,, | Performed by: NURSE PRACTITIONER

## 2019-02-24 PROCEDURE — 93010 EKG 12-LEAD: ICD-10-PCS | Mod: S$GLB,,, | Performed by: INTERNAL MEDICINE

## 2019-02-24 PROCEDURE — 3077F SYST BP >= 140 MM HG: CPT | Mod: CPTII,S$GLB,, | Performed by: NURSE PRACTITIONER

## 2019-02-24 PROCEDURE — 93010 ELECTROCARDIOGRAM REPORT: CPT | Mod: S$GLB,,, | Performed by: INTERNAL MEDICINE

## 2019-02-24 PROCEDURE — 87502 POCT INFLUENZA A/B MOLECULAR: ICD-10-PCS | Mod: QW,S$GLB,, | Performed by: NURSE PRACTITIONER

## 2019-02-24 PROCEDURE — 99999 PR PBB SHADOW E&M-EST. PATIENT-LVL IV: CPT | Mod: PBBFAC,,, | Performed by: NURSE PRACTITIONER

## 2019-02-24 PROCEDURE — 93005 ELECTROCARDIOGRAM TRACING: CPT | Mod: S$GLB,,, | Performed by: NURSE PRACTITIONER

## 2019-02-24 PROCEDURE — 99214 OFFICE O/P EST MOD 30 MIN: CPT | Mod: S$GLB,,, | Performed by: NURSE PRACTITIONER

## 2019-02-24 RX ORDER — BENZONATATE 100 MG/1
100 CAPSULE ORAL 3 TIMES DAILY PRN
Qty: 30 CAPSULE | Refills: 0 | Status: SHIPPED | OUTPATIENT
Start: 2019-02-24 | End: 2019-03-06

## 2019-02-24 RX ORDER — PROMETHAZINE HYDROCHLORIDE AND DEXTROMETHORPHAN HYDROBROMIDE 6.25; 15 MG/5ML; MG/5ML
5 SYRUP ORAL NIGHTLY PRN
Qty: 118 ML | Refills: 0 | Status: SHIPPED | OUTPATIENT
Start: 2019-02-24 | End: 2019-03-06

## 2019-02-24 RX ORDER — FLUTICASONE PROPIONATE 50 MCG
2 SPRAY, SUSPENSION (ML) NASAL DAILY
Qty: 16 G | Refills: 0 | Status: SHIPPED | OUTPATIENT
Start: 2019-02-24 | End: 2019-03-26

## 2019-02-24 NOTE — PATIENT INSTRUCTIONS
Viral Upper Respiratory Illness (Adult)  You have a viral upper respiratory illness (URI), which is another term for the common cold. This illness is contagious during the first few days. It is spread through the air by coughing and sneezing. It may also be spread by direct contact (touching the sick person and then touching your own eyes, nose, or mouth). Frequent handwashing will decrease risk of spread. Most viral illnesses go away within 7 to 10 days with rest and simple home remedies. Sometimes the illness may last for several weeks. Antibiotics will not kill a virus, and they are generally not prescribed for this condition.    Home care  · If symptoms are severe, rest at home for the first 2 to 3 days. When you resume activity, don't let yourself get too tired.  · Avoid being exposed to cigarette smoke (yours or others).  · You may use acetaminophen or ibuprofen to control pain and fever, unless another medicine was prescribed. (Note: If you have chronic liver or kidney disease, have ever had a stomach ulcer or gastrointestinal bleeding, or are taking blood-thinning medicines, talk with your healthcare provider before using these medicines.) Aspirin should never be given to anyone under 18 years of age who is ill with a viral infection or fever. It may cause severe liver or brain damage.  · Your appetite may be poor, so a light diet is fine. Avoid dehydration by drinking 6 to 8 glasses of fluids per day (water, soft drinks, juices, tea, or soup). Extra fluids will help loosen secretions in the nose and lungs.  · Over-the-counter cold medicines will not shorten the length of time youre sick, but they may be helpful for the following symptoms: cough, sore throat, and nasal and sinus congestion. (Note: Do not use decongestants if you have high blood pressure.)  Follow-up care  Follow up with your healthcare provider, or as advised.  When to seek medical advice  Call your healthcare provider right away if any  of these occur:  · Cough with lots of colored sputum (mucus)  · Severe headache; face, neck, or ear pain  · Difficulty swallowing due to throat pain  · Fever of 100.4°F (38°C)  Call 911, or get immediate medical care  Call emergency services right away if any of these occur:  · Chest pain, shortness of breath, wheezing, or difficulty breathing  · Coughing up blood  · Inability to swallow due to throat pain  Date Last Reviewed: 9/13/2015  © 4458-9112 [employer]. [address] [place] [id number]. All rights reserved. This information is not intended as a substitute for professional medical care. Always follow your healthcare professional's instructions.

## 2019-02-24 NOTE — PROGRESS NOTES
Subjective:       Patient ID: Tali Fitzpatrick is a 68 y.o. female.    Chief Complaint: Fatigue; Cough; and Emesis    Pt is a 68 year old female to clinic today with complaints of myalgia, fever, chills, fatigue and nausea that began 2 days ago.       Cough   Associated symptoms include chills, a fever, headaches, myalgias, postnasal drip and rhinorrhea. Pertinent negatives include no chest pain, ear pain, rash, sore throat, shortness of breath or wheezing.   Emesis    Associated symptoms include chills, coughing, a fever, headaches and myalgias. Pertinent negatives include no abdominal pain, chest pain, diarrhea or dizziness.   Sinus Problem   This is a new problem. The current episode started in the past 7 days. The problem has been gradually worsening since onset. Maximum temperature: 99.5. The fever has been present for 1 to 2 days. Her pain is at a severity of 0/10. She is experiencing no pain. Associated symptoms include chills, congestion, coughing, headaches and sinus pressure. Pertinent negatives include no diaphoresis, ear pain, hoarse voice, neck pain, shortness of breath, sneezing, sore throat or swollen glands. Treatments tried: mucinex, nyquil. The treatment provided no relief.     Review of Systems   Constitutional: Positive for chills, fatigue and fever. Negative for diaphoresis.   HENT: Positive for congestion, postnasal drip, rhinorrhea and sinus pressure. Negative for ear discharge, ear pain, hoarse voice, sinus pain, sneezing, sore throat and trouble swallowing.    Respiratory: Positive for cough. Negative for chest tightness, shortness of breath and wheezing.    Cardiovascular: Negative for chest pain and palpitations.   Gastrointestinal: Positive for nausea. Negative for abdominal pain, diarrhea and vomiting.   Musculoskeletal: Positive for myalgias. Negative for back pain and neck pain.   Skin: Negative for rash.   Neurological: Positive for headaches. Negative for dizziness and  light-headedness.       Objective:      Physical Exam   Constitutional: She is oriented to person, place, and time. She appears well-developed and well-nourished. No distress.   HENT:   Head: Normocephalic.   Right Ear: External ear and ear canal normal. No tenderness. Tympanic membrane is not bulging. A middle ear effusion is present.   Left Ear: External ear and ear canal normal. No tenderness. Tympanic membrane is not bulging. A middle ear effusion is present.   Nose: Mucosal edema and rhinorrhea present. Right sinus exhibits maxillary sinus tenderness and frontal sinus tenderness. Left sinus exhibits maxillary sinus tenderness and frontal sinus tenderness.   Mouth/Throat: Uvula is midline, oropharynx is clear and moist and mucous membranes are normal. No oropharyngeal exudate, posterior oropharyngeal edema or posterior oropharyngeal erythema.   PND noted     Eyes: Conjunctivae and EOM are normal. Pupils are equal, round, and reactive to light.   Neck: Normal range of motion. Neck supple.   Cardiovascular: Normal rate, regular rhythm and normal heart sounds. Exam reveals no gallop and no friction rub.   No murmur heard.  Pulmonary/Chest: Effort normal and breath sounds normal. No accessory muscle usage or stridor. No apnea, no tachypnea and no bradypnea. No respiratory distress. She has no decreased breath sounds. She has no wheezes. She has no rhonchi. She has no rales.   Lymphadenopathy:        Head (right side): No submental, no submandibular and no tonsillar adenopathy present.        Head (left side): No submental, no submandibular and no tonsillar adenopathy present.     She has no cervical adenopathy.   Neurological: She is alert and oriented to person, place, and time.   Skin: Skin is warm and dry. No rash noted. She is not diaphoretic.   Psychiatric: She has a normal mood and affect. Her speech is normal and behavior is normal. Thought content normal.   Nursing note and vitals reviewed.      Assessment:        1. Upper respiratory tract infection, unspecified type    2. Fatigue, unspecified type    3. Cough    4. Myalgia        Plan:   Upper respiratory tract infection, unspecified type  -     fluticasone (FLONASE) 50 mcg/actuation nasal spray; 2 sprays (100 mcg total) by Each Nare route once daily.  Dispense: 16 g; Refill: 0  -     promethazine-dextromethorphan (PROMETHAZINE-DM) 6.25-15 mg/5 mL Syrp; Take 5 mLs by mouth nightly as needed.  Dispense: 118 mL; Refill: 0  -     benzonatate (TESSALON) 100 MG capsule; Take 1 capsule (100 mg total) by mouth 3 (three) times daily as needed for Cough (Do not take more than 6 capsules in a 24 hour period.).  Dispense: 30 capsule; Refill: 0    Fatigue, unspecified type  -     POCT Influenza A/B Molecular  -     X-Ray Chest PA And Lateral; Future; Expected date: 02/24/2019  -     IN OFFICE EKG 12-LEAD (to Muse)    Cough  -     X-Ray Chest PA And Lateral; Future; Expected date: 02/24/2019  -     promethazine-dextromethorphan (PROMETHAZINE-DM) 6.25-15 mg/5 mL Syrp; Take 5 mLs by mouth nightly as needed.  Dispense: 118 mL; Refill: 0  -     benzonatate (TESSALON) 100 MG capsule; Take 1 capsule (100 mg total) by mouth 3 (three) times daily as needed for Cough (Do not take more than 6 capsules in a 24 hour period.).  Dispense: 30 capsule; Refill: 0    Myalgia  -     POCT Influenza A/B Molecular  -     X-Ray Chest PA And Lateral; Future; Expected date: 02/24/2019  -     IN OFFICE EKG 12-LEAD (to Muse)      · Your symptoms are likely due to a viral infection. These infections can last up to 14 days, but you should notice some improvement of your symptoms within the first 7-10 days. Viral infections will not improve with antibiotics. If your symptoms persist >10 days without improvement or if you have any new or worsening symptoms, this is an indication that you may have developed a bacterial infection and should return to your primary care provider or to Urgent Care.   · Getting  plenty of rest is very important to fighting infections.  · Increase fluids.   · May apply warm compresses as needed for facial pain and congestion.   · Saline nasal spray to loosen nasal congestion.  · Flonase or Nasacort to reduce inflammation in the sinus cavities.  · You may take an over the counter antihistamine for allergy symptoms such as sneezing, itchy/watery eyes, scratchy throat, or congestion.  · Take Tylenol or Ibuprofen as needed for sore throat, body aches, or fever.  · Don't drive, drink alcohol, or take any other medication or substance that causes sedation while taking cough syrup.   · Follow up with your primary care provider if symptoms persist >10 days or sooner for any new or worsening symptoms.   · Go to the ER for any fever that does not improve with Tylenol/Ibuprofen, neck stiffness, rash, severe headache, vision changes, shortness of breath, chest pain, facial swelling, severe facial pain, or any other new and concerning symptoms.

## 2019-05-17 ENCOUNTER — OFFICE VISIT (OUTPATIENT)
Dept: INTERNAL MEDICINE | Facility: CLINIC | Age: 69
End: 2019-05-17
Payer: MEDICARE

## 2019-05-17 ENCOUNTER — LAB VISIT (OUTPATIENT)
Dept: LAB | Facility: HOSPITAL | Age: 69
End: 2019-05-17
Attending: FAMILY MEDICINE
Payer: MEDICARE

## 2019-05-17 VITALS
SYSTOLIC BLOOD PRESSURE: 128 MMHG | TEMPERATURE: 98 F | BODY MASS INDEX: 28.6 KG/M2 | HEIGHT: 68 IN | WEIGHT: 188.69 LBS | HEART RATE: 76 BPM | DIASTOLIC BLOOD PRESSURE: 72 MMHG

## 2019-05-17 DIAGNOSIS — N18.30 STAGE 3 CHRONIC KIDNEY DISEASE: ICD-10-CM

## 2019-05-17 DIAGNOSIS — I10 HYPERTENSION, UNSPECIFIED TYPE: Primary | ICD-10-CM

## 2019-05-17 DIAGNOSIS — F41.9 ANXIETY AND DEPRESSION: ICD-10-CM

## 2019-05-17 DIAGNOSIS — F32.A ANXIETY AND DEPRESSION: ICD-10-CM

## 2019-05-17 DIAGNOSIS — E78.5 HYPERLIPIDEMIA, UNSPECIFIED HYPERLIPIDEMIA TYPE: ICD-10-CM

## 2019-05-17 LAB
ANION GAP SERPL CALC-SCNC: 7 MMOL/L (ref 8–16)
BUN SERPL-MCNC: 14 MG/DL (ref 8–23)
CALCIUM SERPL-MCNC: 9.6 MG/DL (ref 8.7–10.5)
CHLORIDE SERPL-SCNC: 110 MMOL/L (ref 95–110)
CO2 SERPL-SCNC: 28 MMOL/L (ref 23–29)
CREAT SERPL-MCNC: 1.3 MG/DL (ref 0.5–1.4)
EST. GFR  (AFRICAN AMERICAN): 48 ML/MIN/1.73 M^2
EST. GFR  (NON AFRICAN AMERICAN): 41.7 ML/MIN/1.73 M^2
GLUCOSE SERPL-MCNC: 90 MG/DL (ref 70–110)
POTASSIUM SERPL-SCNC: 3.8 MMOL/L (ref 3.5–5.1)
SODIUM SERPL-SCNC: 145 MMOL/L (ref 136–145)

## 2019-05-17 PROCEDURE — 3074F PR MOST RECENT SYSTOLIC BLOOD PRESSURE < 130 MM HG: ICD-10-PCS | Mod: CPTII,S$GLB,, | Performed by: FAMILY MEDICINE

## 2019-05-17 PROCEDURE — 3074F SYST BP LT 130 MM HG: CPT | Mod: CPTII,S$GLB,, | Performed by: FAMILY MEDICINE

## 2019-05-17 PROCEDURE — 99999 PR PBB SHADOW E&M-EST. PATIENT-LVL III: ICD-10-PCS | Mod: PBBFAC,,, | Performed by: FAMILY MEDICINE

## 2019-05-17 PROCEDURE — 3288F FALL RISK ASSESSMENT DOCD: CPT | Mod: CPTII,S$GLB,, | Performed by: FAMILY MEDICINE

## 2019-05-17 PROCEDURE — 3288F PR FALLS RISK ASSESSMENT DOCUMENTED: ICD-10-PCS | Mod: CPTII,S$GLB,, | Performed by: FAMILY MEDICINE

## 2019-05-17 PROCEDURE — 99214 OFFICE O/P EST MOD 30 MIN: CPT | Mod: S$GLB,,, | Performed by: FAMILY MEDICINE

## 2019-05-17 PROCEDURE — 3078F DIAST BP <80 MM HG: CPT | Mod: CPTII,S$GLB,, | Performed by: FAMILY MEDICINE

## 2019-05-17 PROCEDURE — 1100F PR PT FALLS ASSESS DOC 2+ FALLS/FALL W/INJURY/YR: ICD-10-PCS | Mod: CPTII,S$GLB,, | Performed by: FAMILY MEDICINE

## 2019-05-17 PROCEDURE — 80048 BASIC METABOLIC PNL TOTAL CA: CPT

## 2019-05-17 PROCEDURE — 1100F PTFALLS ASSESS-DOCD GE2>/YR: CPT | Mod: CPTII,S$GLB,, | Performed by: FAMILY MEDICINE

## 2019-05-17 PROCEDURE — 99214 PR OFFICE/OUTPT VISIT, EST, LEVL IV, 30-39 MIN: ICD-10-PCS | Mod: S$GLB,,, | Performed by: FAMILY MEDICINE

## 2019-05-17 PROCEDURE — 3078F PR MOST RECENT DIASTOLIC BLOOD PRESSURE < 80 MM HG: ICD-10-PCS | Mod: CPTII,S$GLB,, | Performed by: FAMILY MEDICINE

## 2019-05-17 PROCEDURE — 36415 COLL VENOUS BLD VENIPUNCTURE: CPT | Mod: PO

## 2019-05-17 PROCEDURE — 99999 PR PBB SHADOW E&M-EST. PATIENT-LVL III: CPT | Mod: PBBFAC,,, | Performed by: FAMILY MEDICINE

## 2019-05-17 RX ORDER — PRAVASTATIN SODIUM 20 MG/1
20 TABLET ORAL DAILY
Qty: 90 TABLET | Refills: 3 | Status: SHIPPED | OUTPATIENT
Start: 2019-05-17 | End: 2020-08-27

## 2019-05-17 RX ORDER — CITALOPRAM 40 MG/1
40 TABLET, FILM COATED ORAL DAILY
Qty: 90 TABLET | Refills: 3 | Status: SHIPPED | OUTPATIENT
Start: 2019-05-17 | End: 2020-06-30 | Stop reason: SDUPTHER

## 2019-05-17 RX ORDER — AMLODIPINE BESYLATE 5 MG/1
5 TABLET ORAL DAILY
Qty: 90 TABLET | Refills: 3 | Status: SHIPPED | OUTPATIENT
Start: 2019-05-17 | End: 2020-08-27

## 2019-05-17 RX ORDER — ATENOLOL 50 MG/1
50 TABLET ORAL DAILY
Qty: 90 TABLET | Refills: 3 | Status: SHIPPED | OUTPATIENT
Start: 2019-05-17 | End: 2020-08-27

## 2019-05-19 RX ORDER — ALPRAZOLAM 0.5 MG/1
0.5 TABLET ORAL NIGHTLY PRN
Qty: 30 TABLET | Refills: 5 | Status: SHIPPED | OUTPATIENT
Start: 2019-05-19 | End: 2019-12-31 | Stop reason: SDUPTHER

## 2019-05-19 NOTE — PROGRESS NOTES
"Subjective:      Patient ID: Tali Fitzpatrick is a 68 y.o. female.    Chief Complaint:  F/u chronic conditions    HPI  69 yo with HTN, hyperlipidemia, Anxiety, OA/DJD here for fu.  Uses the Xanax at bedtime, asking if I will prescribe her 1mg.  I have informed her no, as I told her that last visit as well.  She reports then the 0.5mg will work.  She is feeling well.  Taking her meds daily and staying active watching after her grandchildren.  She lives in [place] but is here during the week.  No falls  No CP/SOB  Has CKD, was concerned about that and wants to see how kidneys are looking.  She is avoiding NSAIDs.      Past Medical History:   Diagnosis Date    Anxiety and depression     DJD (degenerative joint disease)     bilateral knees   Hyalgan series completed 8/27/14    Hyperlipidemia     Hypertension     Insomnia      Family History   Problem Relation Age of Onset    Arthritis Mother     Coronary artery disease Mother     Coronary artery disease Father     Colon cancer Neg Hx     Breast cancer Neg Hx     Diabetes Neg Hx      Past Surgical History:   Procedure Laterality Date    CATARACT EXTRACTION W/  INTRAOCULAR LENS IMPLANT      left    cooled radiofrequency ablation to the genicular nerves bilateral knees Bilateral 7/14/2017    Performed by [facility] at [facility]    diagnsotic block of the genicular branches ti the bilateral knees Bilateral 6/30/2017    Performed [facility] at [facility]    EYE SURGERY      LIPOSUCTION      TONSILLECTOMY       Social History     Tobacco Use    Smoking status: Never Smoker    Smokeless tobacco: Never Used   Substance Use Topics    Alcohol use: Yes     Alcohol/week: 0.0 oz     Comment: social    Drug use: Yes     Types: Benzodiazepines       /72 (BP Location: Right arm, Patient Position: Sitting, BP Method: Large (Manual))   Pulse 76   Temp 98.4 °F (36.9 °C) (Oral)   Ht 5' 8" (1.727 m)   Wt 85.6 kg (188 lb 11.4 oz)   BMI " 28.69 kg/m²     Review of Systems   Constitutional: Negative for activity change, appetite change, chills, diaphoresis, fatigue, fever and unexpected weight change.   HENT: Negative for ear pain, hearing loss, postnasal drip, rhinorrhea and tinnitus.    Eyes: Negative for visual disturbance.   Respiratory: Negative for cough, shortness of breath and wheezing.    Cardiovascular: Negative for chest pain, palpitations and leg swelling.   Gastrointestinal: Negative for abdominal distention.   Genitourinary: Negative for dysuria, frequency, hematuria and urgency.   Musculoskeletal: Positive for arthralgias. Negative for back pain and joint swelling.   Neurological: Negative for weakness and headaches.   Hematological: Negative for adenopathy.   Psychiatric/Behavioral: Positive for sleep disturbance. Negative for confusion and decreased concentration. The patient is nervous/anxious.        Objective:     Physical Exam   Constitutional: She is oriented to person, place, and time. She appears well-developed and well-nourished. No distress.   HENT:   Right Ear: External ear normal.   Left Ear: External ear normal.   Nose: Nose normal.   Mouth/Throat: Oropharynx is clear and moist.   Eyes: Pupils are equal, round, and reactive to light. Conjunctivae are normal.   Neck: Normal range of motion. Neck supple. Carotid bruit is not present.   Cardiovascular: Normal rate, regular rhythm and normal heart sounds.   Pulmonary/Chest: Effort normal and breath sounds normal. No respiratory distress. She has no wheezes. She has no rales.   Abdominal: Soft. Bowel sounds are normal. She exhibits no distension. There is no tenderness. There is no guarding.   Musculoskeletal: She exhibits no edema.   Neurological: She is alert and oriented to person, place, and time. No cranial nerve deficit.   Skin: Skin is warm and dry. No rash noted.   Psychiatric: She has a normal mood and affect. Her behavior is normal. Judgment and thought content  normal.   Nursing note and vitals reviewed.      Lab Results   Component Value Date    WBC 4.18 11/06/2018    HGB 12.5 11/06/2018    HCT 41.2 11/06/2018     11/06/2018    CHOL 233 (H) 11/06/2018    TRIG 106 11/06/2018    HDL 67 11/06/2018    ALT 15 11/06/2018    AST 18 11/06/2018     05/17/2019    K 3.8 05/17/2019     05/17/2019    CREATININE 1.3 05/17/2019    BUN 14 05/17/2019    CO2 28 05/17/2019    TSH 1.896 11/06/2018       Assessment:     1. Hypertension, unspecified type    2. Stage 3 chronic kidney disease    3. Anxiety and depression    4. Hyperlipidemia, unspecified hyperlipidemia type         Plan:     Hypertension, unspecified type    Stage 3 chronic kidney disease  -     Basic metabolic panel; Future; Expected date: 05/17/2019    Anxiety and depression    Hyperlipidemia, unspecified hyperlipidemia type    Other orders  -     pravastatin (PRAVACHOL) 20 MG tablet; Take 1 tablet (20 mg total) by mouth once daily.  Dispense: 90 tablet; Refill: 3  -     citalopram (CELEXA) 40 MG tablet; Take 1 tablet (40 mg total) by mouth once daily.  Dispense: 90 tablet; Refill: 3  -     atenolol (TENORMIN) 50 MG tablet; Take 1 tablet (50 mg total) by mouth once daily.  Dispense: 90 tablet; Refill: 3  -     amLODIPine (NORVASC) 5 MG tablet; Take 1 tablet (5 mg total) by mouth once daily.  Dispense: 90 tablet; Refill: 3  -     ALPRAZolam (XANAX) 0.5 MG tablet; Take 1 tablet (0.5 mg total) by mouth nightly as needed.  Dispense: 30 tablet; Refill: 5    BP controlled, cont current regimen  Lipids stable, cont statin  Anxiety stable, Xanax 0.5mg nightly.  No increase in doseage advised.  For OA, avoid NSAIDs, monitor CKD.  Keep fluid intake in 64 oz  F/u 6 mos with all labs

## 2019-10-04 RX ORDER — HYDROXYZINE PAMOATE 50 MG/1
50 CAPSULE ORAL EVERY 8 HOURS PRN
Qty: 30 CAPSULE | Refills: 2 | Status: SHIPPED | OUTPATIENT
Start: 2019-10-04 | End: 2019-12-31 | Stop reason: SDUPTHER

## 2019-11-11 ENCOUNTER — OFFICE VISIT (OUTPATIENT)
Dept: URGENT CARE | Facility: CLINIC | Age: 69
End: 2019-11-11
Payer: MEDICARE

## 2019-11-11 VITALS
BODY MASS INDEX: 28.63 KG/M2 | DIASTOLIC BLOOD PRESSURE: 76 MMHG | SYSTOLIC BLOOD PRESSURE: 168 MMHG | TEMPERATURE: 98 F | OXYGEN SATURATION: 99 % | HEART RATE: 72 BPM | WEIGHT: 188.94 LBS | HEIGHT: 68 IN

## 2019-11-11 DIAGNOSIS — R35.0 URINARY FREQUENCY: ICD-10-CM

## 2019-11-11 DIAGNOSIS — N30.00 ACUTE CYSTITIS WITHOUT HEMATURIA: Primary | ICD-10-CM

## 2019-11-11 DIAGNOSIS — R10.2 SUPRAPUBIC PAIN: ICD-10-CM

## 2019-11-11 LAB
BILIRUB SERPL-MCNC: ABNORMAL MG/DL
BLOOD URINE, POC: ABNORMAL
COLOR, POC UA: YELLOW
GLUCOSE UR QL STRIP: ABNORMAL
KETONES UR QL STRIP: ABNORMAL
LEUKOCYTE ESTERASE URINE, POC: ABNORMAL
NITRITE, POC UA: ABNORMAL
PH, POC UA: 8
PROTEIN, POC: ABNORMAL
SPECIFIC GRAVITY, POC UA: 1.01
UROBILINOGEN, POC UA: 1

## 2019-11-11 PROCEDURE — 99214 OFFICE O/P EST MOD 30 MIN: CPT | Mod: S$GLB,,, | Performed by: NURSE PRACTITIONER

## 2019-11-11 PROCEDURE — 99999 PR PBB SHADOW E&M-EST. PATIENT-LVL IV: CPT | Mod: PBBFAC,,, | Performed by: NURSE PRACTITIONER

## 2019-11-11 PROCEDURE — 3078F PR MOST RECENT DIASTOLIC BLOOD PRESSURE < 80 MM HG: ICD-10-PCS | Mod: CPTII,S$GLB,, | Performed by: NURSE PRACTITIONER

## 2019-11-11 PROCEDURE — 99999 PR PBB SHADOW E&M-EST. PATIENT-LVL IV: ICD-10-PCS | Mod: PBBFAC,,, | Performed by: NURSE PRACTITIONER

## 2019-11-11 PROCEDURE — 99214 PR OFFICE/OUTPT VISIT, EST, LEVL IV, 30-39 MIN: ICD-10-PCS | Mod: S$GLB,,, | Performed by: NURSE PRACTITIONER

## 2019-11-11 PROCEDURE — 1101F PR PT FALLS ASSESS DOC 0-1 FALLS W/OUT INJ PAST YR: ICD-10-PCS | Mod: CPTII,S$GLB,, | Performed by: NURSE PRACTITIONER

## 2019-11-11 PROCEDURE — 3077F SYST BP >= 140 MM HG: CPT | Mod: CPTII,S$GLB,, | Performed by: NURSE PRACTITIONER

## 2019-11-11 PROCEDURE — 1101F PT FALLS ASSESS-DOCD LE1/YR: CPT | Mod: CPTII,S$GLB,, | Performed by: NURSE PRACTITIONER

## 2019-11-11 PROCEDURE — 3077F PR MOST RECENT SYSTOLIC BLOOD PRESSURE >= 140 MM HG: ICD-10-PCS | Mod: CPTII,S$GLB,, | Performed by: NURSE PRACTITIONER

## 2019-11-11 PROCEDURE — 3078F DIAST BP <80 MM HG: CPT | Mod: CPTII,S$GLB,, | Performed by: NURSE PRACTITIONER

## 2019-11-11 PROCEDURE — 81002 POCT URINE DIPSTICK WITHOUT MICROSCOPE: ICD-10-PCS | Mod: S$GLB,,, | Performed by: NURSE PRACTITIONER

## 2019-11-11 PROCEDURE — 81002 URINALYSIS NONAUTO W/O SCOPE: CPT | Mod: S$GLB,,, | Performed by: NURSE PRACTITIONER

## 2019-11-11 RX ORDER — NITROFURANTOIN 25; 75 MG/1; MG/1
100 CAPSULE ORAL 2 TIMES DAILY
Qty: 10 CAPSULE | Refills: 0 | Status: SHIPPED | OUTPATIENT
Start: 2019-11-11 | End: 2019-11-16

## 2019-11-11 NOTE — PROGRESS NOTES
"Subjective:      Patient ID: Tali Fitzpatrick is a 69 y.o. female.    Chief Complaint: Hip Pain and Urinary Tract Infection    BP (!) 168/76 (BP Location: Left arm, Patient Position: Sitting)   Pulse 72   Temp 98.4 °F (36.9 °C)   Ht 5' 8" (1.727 m)   Wt 85.7 kg (188 lb 15 oz)   SpO2 99%   BMI 28.73 kg/m²     Urinary Frequency    This is a new problem. The current episode started in the past 7 days. The problem occurs intermittently. The problem has been gradually worsening. Quality: sharp. The pain is at a severity of 5/10 (at its worst). Pain severity now: pain is intermittent. There has been no fever. Associated symptoms include frequency and withholding. Pertinent negatives include no chills, discharge, flank pain, hematuria, hesitancy, nausea, urgency or vomiting. She has tried nothing for the symptoms.       Review of patient's allergies indicates:   Allergen Reactions    Micardis [telmisartan]      Can't remember        Review of Systems   Constitutional: Negative for chills, fever and malaise/fatigue.   Respiratory: Negative for shortness of breath and wheezing.    Cardiovascular: Negative for chest pain and palpitations.   Gastrointestinal: Negative for nausea and vomiting.   Genitourinary: Positive for frequency. Negative for dysuria, flank pain, hematuria, hesitancy and urgency.        Left sided suprapubic pain   Musculoskeletal: Negative for back pain and myalgias.   All other systems reviewed and are negative.     Objective:      Physical Exam   Constitutional: She is oriented to person, place, and time. Vital signs are normal. She appears well-developed and well-nourished. She is cooperative.   HENT:   Head: Normocephalic and atraumatic.   Right Ear: External ear normal.   Left Ear: External ear normal.   Nose: No mucosal edema or rhinorrhea.   Mouth/Throat: Mucous membranes are normal.   Eyes: Pupils are equal, round, and reactive to light. Conjunctivae, EOM and lids are normal.   Neck: Normal " range of motion. Neck supple.   Cardiovascular: Normal rate, regular rhythm and normal heart sounds.   Pulmonary/Chest: Effort normal and breath sounds normal.   Abdominal: Soft. Bowel sounds are normal. There is tenderness (left side) in the suprapubic area. There is no rebound, no guarding and no CVA tenderness.   Musculoskeletal: Normal range of motion.   Neurological: She is alert and oriented to person, place, and time.   Skin: Skin is warm and dry. Capillary refill takes less than 2 seconds.   Psychiatric: She has a normal mood and affect. Her behavior is normal.   Vitals reviewed.      Assessment:       1. Acute cystitis without hematuria    2. Urinary frequency    3. Suprapubic pain        Plan:     Acute cystitis without hematuria  -     nitrofurantoin, macrocrystal-monohydrate, (MACROBID) 100 MG capsule; Take 1 capsule (100 mg total) by mouth 2 (two) times daily. for 5 days  Dispense: 10 capsule; Refill: 0    Urinary frequency  -     POCT URINE DIPSTICK WITHOUT MICROSCOPE    Suprapubic pain    · Increase fluids (avoid caffeine or sugary beverages as these will irritate the bladder).   · Take your antibiotics exactly as prescribed and make sure to complete entire course even if you begin to feel better. This will prevent recurrence of infection and antibiotic resistance.   · We have prescribed an antibiotic that covers most bacteria that cause urinary tract infections, however, if your urine culture shows that you have any bacterial resistance to the antibiotic you were prescribed or an unusual bacterial strain, we will notify you and make any necessary changes. Urine cultures usually result in about three days.   · Please follow up with your primary care provider if your symptoms do not improve within 2-3 days or sooner for any new or worsening symptoms.  · For any severe pain, bright red blood in urine, difficulty urinating, fever that does not improve with Tylenol or Ibuprofen, inability to urinate,  incontinence of urine or bowels, or for any other urgent concerns, please go to the ER for immediate evaluation.

## 2019-12-19 ENCOUNTER — PATIENT OUTREACH (OUTPATIENT)
Dept: ADMINISTRATIVE | Facility: HOSPITAL | Age: 69
End: 2019-12-19

## 2019-12-19 NOTE — PROGRESS NOTES
HM reviewed.   Immunizations abstracted. CC note added.  Care Everywhere abstracted. No new results found.  Health Maintenance Due   Topic    Mammogram     Pneumococcal Vaccine (65+ High/Highest Risk) (2 of 2 - PPSV23)     Previsit chart audit completed.  *KDL*

## 2019-12-31 ENCOUNTER — OFFICE VISIT (OUTPATIENT)
Dept: INTERNAL MEDICINE | Facility: CLINIC | Age: 69
End: 2019-12-31
Payer: MEDICARE

## 2019-12-31 ENCOUNTER — LAB VISIT (OUTPATIENT)
Dept: LAB | Facility: HOSPITAL | Age: 69
End: 2019-12-31
Attending: FAMILY MEDICINE
Payer: MEDICARE

## 2019-12-31 VITALS
BODY MASS INDEX: 28.74 KG/M2 | TEMPERATURE: 99 F | SYSTOLIC BLOOD PRESSURE: 138 MMHG | WEIGHT: 189.63 LBS | HEIGHT: 68 IN | HEART RATE: 80 BPM | DIASTOLIC BLOOD PRESSURE: 82 MMHG

## 2019-12-31 DIAGNOSIS — Z00.00 ANNUAL PHYSICAL EXAM: Primary | ICD-10-CM

## 2019-12-31 DIAGNOSIS — F41.9 ANXIETY AND DEPRESSION: ICD-10-CM

## 2019-12-31 DIAGNOSIS — F32.A ANXIETY AND DEPRESSION: ICD-10-CM

## 2019-12-31 DIAGNOSIS — E78.5 HYPERLIPIDEMIA, UNSPECIFIED HYPERLIPIDEMIA TYPE: ICD-10-CM

## 2019-12-31 DIAGNOSIS — I10 HYPERTENSION, UNSPECIFIED TYPE: ICD-10-CM

## 2019-12-31 DIAGNOSIS — N18.30 STAGE 3 CHRONIC KIDNEY DISEASE: ICD-10-CM

## 2019-12-31 LAB
ALBUMIN SERPL BCP-MCNC: 3.7 G/DL (ref 3.5–5.2)
ALP SERPL-CCNC: 62 U/L (ref 55–135)
ALT SERPL W/O P-5'-P-CCNC: 12 U/L (ref 10–44)
ANION GAP SERPL CALC-SCNC: 6 MMOL/L (ref 8–16)
AST SERPL-CCNC: 14 U/L (ref 10–40)
BASOPHILS # BLD AUTO: 0.04 K/UL (ref 0–0.2)
BASOPHILS NFR BLD: 0.8 % (ref 0–1.9)
BILIRUB SERPL-MCNC: 0.3 MG/DL (ref 0.1–1)
BUN SERPL-MCNC: 19 MG/DL (ref 8–23)
CALCIUM SERPL-MCNC: 9.5 MG/DL (ref 8.7–10.5)
CHLORIDE SERPL-SCNC: 108 MMOL/L (ref 95–110)
CHOLEST SERPL-MCNC: 218 MG/DL (ref 120–199)
CHOLEST/HDLC SERPL: 3.3 {RATIO} (ref 2–5)
CO2 SERPL-SCNC: 28 MMOL/L (ref 23–29)
CREAT SERPL-MCNC: 1.3 MG/DL (ref 0.5–1.4)
DIFFERENTIAL METHOD: ABNORMAL
EOSINOPHIL # BLD AUTO: 0.1 K/UL (ref 0–0.5)
EOSINOPHIL NFR BLD: 2 % (ref 0–8)
ERYTHROCYTE [DISTWIDTH] IN BLOOD BY AUTOMATED COUNT: 16.3 % (ref 11.5–14.5)
EST. GFR  (AFRICAN AMERICAN): 47.7 ML/MIN/1.73 M^2
EST. GFR  (NON AFRICAN AMERICAN): 41.4 ML/MIN/1.73 M^2
GLUCOSE SERPL-MCNC: 74 MG/DL (ref 70–110)
HCT VFR BLD AUTO: 40.8 % (ref 37–48.5)
HDLC SERPL-MCNC: 67 MG/DL (ref 40–75)
HDLC SERPL: 30.7 % (ref 20–50)
HGB BLD-MCNC: 12.8 G/DL (ref 12–16)
IMM GRANULOCYTES # BLD AUTO: 0.01 K/UL (ref 0–0.04)
IMM GRANULOCYTES NFR BLD AUTO: 0.2 % (ref 0–0.5)
LDLC SERPL CALC-MCNC: 130.4 MG/DL (ref 63–159)
LYMPHOCYTES # BLD AUTO: 1.7 K/UL (ref 1–4.8)
LYMPHOCYTES NFR BLD: 33 % (ref 18–48)
MCH RBC QN AUTO: 25.7 PG (ref 27–31)
MCHC RBC AUTO-ENTMCNC: 31.4 G/DL (ref 32–36)
MCV RBC AUTO: 82 FL (ref 82–98)
MONOCYTES # BLD AUTO: 0.5 K/UL (ref 0.3–1)
MONOCYTES NFR BLD: 9.9 % (ref 4–15)
NEUTROPHILS # BLD AUTO: 2.7 K/UL (ref 1.8–7.7)
NEUTROPHILS NFR BLD: 54.1 % (ref 38–73)
NONHDLC SERPL-MCNC: 151 MG/DL
NRBC BLD-RTO: 0 /100 WBC
PLATELET # BLD AUTO: 224 K/UL (ref 150–350)
PMV BLD AUTO: 11.2 FL (ref 9.2–12.9)
POTASSIUM SERPL-SCNC: 4.2 MMOL/L (ref 3.5–5.1)
PROT SERPL-MCNC: 7.6 G/DL (ref 6–8.4)
RBC # BLD AUTO: 4.98 M/UL (ref 4–5.4)
SODIUM SERPL-SCNC: 142 MMOL/L (ref 136–145)
TRIGL SERPL-MCNC: 103 MG/DL (ref 30–150)
TSH SERPL DL<=0.005 MIU/L-ACNC: 1.73 UIU/ML (ref 0.4–4)
WBC # BLD AUTO: 5.03 K/UL (ref 3.9–12.7)

## 2019-12-31 PROCEDURE — 99999 PR PBB SHADOW E&M-EST. PATIENT-LVL III: CPT | Mod: PBBFAC,,, | Performed by: FAMILY MEDICINE

## 2019-12-31 PROCEDURE — 80061 LIPID PANEL: CPT

## 2019-12-31 PROCEDURE — 3079F DIAST BP 80-89 MM HG: CPT | Mod: CPTII,S$GLB,, | Performed by: FAMILY MEDICINE

## 2019-12-31 PROCEDURE — 3075F PR MOST RECENT SYSTOLIC BLOOD PRESS GE 130-139MM HG: ICD-10-PCS | Mod: CPTII,S$GLB,, | Performed by: FAMILY MEDICINE

## 2019-12-31 PROCEDURE — 3075F SYST BP GE 130 - 139MM HG: CPT | Mod: CPTII,S$GLB,, | Performed by: FAMILY MEDICINE

## 2019-12-31 PROCEDURE — 84443 ASSAY THYROID STIM HORMONE: CPT

## 2019-12-31 PROCEDURE — 99397 PR PREVENTIVE VISIT,EST,65 & OVER: ICD-10-PCS | Mod: 25,S$GLB,, | Performed by: FAMILY MEDICINE

## 2019-12-31 PROCEDURE — 80053 COMPREHEN METABOLIC PANEL: CPT

## 2019-12-31 PROCEDURE — G0009 ADMIN PNEUMOCOCCAL VACCINE: HCPCS | Mod: S$GLB,,, | Performed by: FAMILY MEDICINE

## 2019-12-31 PROCEDURE — 99397 PER PM REEVAL EST PAT 65+ YR: CPT | Mod: 25,S$GLB,, | Performed by: FAMILY MEDICINE

## 2019-12-31 PROCEDURE — 85025 COMPLETE CBC W/AUTO DIFF WBC: CPT

## 2019-12-31 PROCEDURE — 90732 PPSV23 VACC 2 YRS+ SUBQ/IM: CPT | Mod: S$GLB,,, | Performed by: FAMILY MEDICINE

## 2019-12-31 PROCEDURE — 90732 PNEUMOCOCCAL POLYSACCHARIDE VACCINE 23-VALENT =>2YO SQ IM: ICD-10-PCS | Mod: S$GLB,,, | Performed by: FAMILY MEDICINE

## 2019-12-31 PROCEDURE — 3079F PR MOST RECENT DIASTOLIC BLOOD PRESSURE 80-89 MM HG: ICD-10-PCS | Mod: CPTII,S$GLB,, | Performed by: FAMILY MEDICINE

## 2019-12-31 PROCEDURE — 99999 PR PBB SHADOW E&M-EST. PATIENT-LVL III: ICD-10-PCS | Mod: PBBFAC,,, | Performed by: FAMILY MEDICINE

## 2019-12-31 PROCEDURE — 36415 COLL VENOUS BLD VENIPUNCTURE: CPT | Mod: PO

## 2019-12-31 PROCEDURE — G0009 PNEUMOCOCCAL POLYSACCHARIDE VACCINE 23-VALENT =>2YO SQ IM: ICD-10-PCS | Mod: S$GLB,,, | Performed by: FAMILY MEDICINE

## 2019-12-31 RX ORDER — LANOLIN ALCOHOL/MO/W.PET/CERES
1000 CREAM (GRAM) TOPICAL DAILY
COMMUNITY

## 2019-12-31 RX ORDER — ALPRAZOLAM 0.5 MG/1
0.5 TABLET ORAL NIGHTLY PRN
Qty: 30 TABLET | Refills: 5 | Status: SHIPPED | OUTPATIENT
Start: 2019-12-31 | End: 2020-06-30 | Stop reason: SDUPTHER

## 2019-12-31 RX ORDER — HYDROXYZINE PAMOATE 50 MG/1
50 CAPSULE ORAL EVERY 8 HOURS PRN
Qty: 120 CAPSULE | Refills: 2 | Status: SHIPPED | OUTPATIENT
Start: 2019-12-31 | End: 2021-02-03

## 2019-12-31 NOTE — PROGRESS NOTES
"Subjective:      Patient ID: Tali Fitzpatrick is a 69 y.o. female.    Chief Complaint: Follow-up (6 mo )    HPI  70 yo with HTN, hyperlipidemia, CKD, anxiety, insomnia here for annual.  Frustrated/stressed with 89 yo .  She still lives in [place] but comes down here to see her grandkids and help with them.  Saw Neuro for memory but more likely related to stress/anxiety.  Has appt to see Psych.  Is taking celexa 40mg and xanax 0.5mg at bedtime.  Uses vistaril for itching/sleep PRN  Refuses mammogram    Past Medical History:   Diagnosis Date    Anxiety and depression     DJD (degenerative joint disease)     bilateral knees   Hyalgan series completed 8/27/14    Hyperlipidemia     Hypertension     Insomnia      Family History   Problem Relation Age of Onset    Arthritis Mother     Coronary artery disease Mother     Coronary artery disease Father     Colon cancer Neg Hx     Breast cancer Neg Hx     Diabetes Neg Hx      Past Surgical History:   Procedure Laterality Date    CATARACT EXTRACTION W/  INTRAOCULAR LENS IMPLANT      left    EYE SURGERY      LIPOSUCTION      TONSILLECTOMY       Social History     Tobacco Use    Smoking status: Never Smoker    Smokeless tobacco: Never Used   Substance Use Topics    Alcohol use: Yes     Alcohol/week: 0.0 standard drinks     Comment: social    Drug use: Yes     Types: Benzodiazepines       /82 (BP Location: Right arm, Patient Position: Sitting, BP Method: X-Large (Manual))   Pulse 80   Temp 98.7 °F (37.1 °C) (Oral)   Ht 5' 8" (1.727 m)   Wt 86 kg (189 lb 9.5 oz)   BMI 28.83 kg/m²     Review of Systems   Constitutional: Negative for activity change, appetite change, chills, diaphoresis, fatigue, fever and unexpected weight change.   HENT: Negative for ear pain, hearing loss, postnasal drip, rhinorrhea and tinnitus.    Eyes: Negative for visual disturbance.   Respiratory: Negative for cough, shortness of breath and wheezing.    Cardiovascular: " Negative for chest pain, palpitations and leg swelling.   Gastrointestinal: Negative for abdominal distention, abdominal pain, constipation and diarrhea.   Genitourinary: Negative for dysuria, frequency, hematuria and urgency.   Musculoskeletal: Negative for back pain and joint swelling.   Neurological: Negative for weakness and headaches.   Hematological: Negative for adenopathy.   Psychiatric/Behavioral: Positive for sleep disturbance. Negative for confusion and decreased concentration. The patient is nervous/anxious.        Objective:     Physical Exam   Constitutional: She is oriented to person, place, and time. She appears well-developed and well-nourished. No distress.   HENT:   Right Ear: External ear normal.   Left Ear: External ear normal.   Nose: Nose normal.   Mouth/Throat: Oropharynx is clear and moist.   Eyes: Pupils are equal, round, and reactive to light. Conjunctivae are normal.   Neck: Normal range of motion. Neck supple. Carotid bruit is not present.   Cardiovascular: Normal rate, regular rhythm and normal heart sounds.   Pulmonary/Chest: Effort normal and breath sounds normal. No respiratory distress. She has no wheezes. She has no rales.   Abdominal: Soft. Bowel sounds are normal. She exhibits no distension. There is no tenderness. There is no guarding.   Musculoskeletal: She exhibits no edema.   Neurological: She is alert and oriented to person, place, and time. No cranial nerve deficit.   Skin: Skin is warm and dry. No rash noted.   Psychiatric: She has a normal mood and affect. Her behavior is normal. Judgment and thought content normal.   Nursing note and vitals reviewed.      Lab Results   Component Value Date    WBC 4.18 11/06/2018    HGB 12.5 11/06/2018    HCT 41.2 11/06/2018     11/06/2018    CHOL 233 (H) 11/06/2018    TRIG 106 11/06/2018    HDL 67 11/06/2018    ALT 15 11/06/2018    AST 18 11/06/2018     05/17/2019    K 3.8 05/17/2019     05/17/2019    CREATININE 1.3  05/17/2019    BUN 14 05/17/2019    CO2 28 05/17/2019    TSH 1.896 11/06/2018       Assessment:     1. Annual physical exam    2. Hypertension, unspecified type    3. Stage 3 chronic kidney disease    4. Hyperlipidemia, unspecified hyperlipidemia type    5. Anxiety and depression         Plan:     Annual physical exam    Hypertension, unspecified type  -     CBC auto differential; Future; Expected date: 12/31/2019    Stage 3 chronic kidney disease  -     Comprehensive metabolic panel; Future; Expected date: 12/31/2019    Hyperlipidemia, unspecified hyperlipidemia type  -     Lipid panel; Future; Expected date: 12/31/2019  -     TSH; Future; Expected date: 12/31/2019    Anxiety and depression    Other orders  -     (In Office Administered) Pneumococcal Polysaccharide Vaccine (23 Valent) (SQ/IM)  -     hydrOXYzine pamoate (VISTARIL) 50 MG Cap; Take 1 capsule (50 mg total) by mouth every 8 (eight) hours as needed.  Dispense: 120 capsule; Refill: 2  -     ALPRAZolam (XANAX) 0.5 MG tablet; Take 1 tablet (0.5 mg total) by mouth nightly as needed.  Dispense: 30 tablet; Refill: 5    Update labs today  BP stable/cont meds  Refill on Xanax to use nightly PRN  Pt going to be seeing Psych//no changes to regimen at this time.  Pneumo 23 today  Pt refuses further mammogram screening  Avoid NSAIDs/stay well hydrated with water  F/u 6 mos//med check

## 2020-01-02 ENCOUNTER — PATIENT MESSAGE (OUTPATIENT)
Dept: INTERNAL MEDICINE | Facility: CLINIC | Age: 70
End: 2020-01-02

## 2020-01-02 DIAGNOSIS — N18.30 STAGE 3 CHRONIC KIDNEY DISEASE: Primary | ICD-10-CM

## 2020-01-02 NOTE — TELEPHONE ENCOUNTER
Called pt and let know that Dr. Muniz sent lab results through portal saying :       Written by Yobani Osborn MD on 1/2/2020  8:17 AM   Labs overall are stable//kidney function is stable/similar to last check.     Will plan to recheck levels in 6 months at your follow up visit.   Booked labs 6-23-19 10:30 am.

## 2020-05-27 ENCOUNTER — PATIENT OUTREACH (OUTPATIENT)
Dept: ADMINISTRATIVE | Facility: HOSPITAL | Age: 70
End: 2020-05-27

## 2020-06-16 ENCOUNTER — PATIENT OUTREACH (OUTPATIENT)
Dept: ADMINISTRATIVE | Facility: HOSPITAL | Age: 70
End: 2020-06-16

## 2020-06-16 NOTE — PROGRESS NOTES
HM reviewed and updated. Immunizations abstracted.  Care Everywhere abstracted. CC note updated.  Attempted to call pt to inquire about colonoscopy, no answer.  Previsit chart audit completed.  *KDL*

## 2020-06-30 ENCOUNTER — LAB VISIT (OUTPATIENT)
Dept: LAB | Facility: HOSPITAL | Age: 70
End: 2020-06-30
Attending: FAMILY MEDICINE
Payer: MEDICARE

## 2020-06-30 ENCOUNTER — OFFICE VISIT (OUTPATIENT)
Dept: INTERNAL MEDICINE | Facility: CLINIC | Age: 70
End: 2020-06-30
Payer: MEDICARE

## 2020-06-30 VITALS
BODY MASS INDEX: 29.01 KG/M2 | HEIGHT: 68 IN | HEART RATE: 66 BPM | SYSTOLIC BLOOD PRESSURE: 124 MMHG | TEMPERATURE: 99 F | WEIGHT: 191.38 LBS | DIASTOLIC BLOOD PRESSURE: 70 MMHG

## 2020-06-30 DIAGNOSIS — F32.A ANXIETY AND DEPRESSION: ICD-10-CM

## 2020-06-30 DIAGNOSIS — N18.30 STAGE 3 CHRONIC KIDNEY DISEASE: ICD-10-CM

## 2020-06-30 DIAGNOSIS — N18.30 STAGE 3 CHRONIC KIDNEY DISEASE: Primary | ICD-10-CM

## 2020-06-30 DIAGNOSIS — E78.5 HYPERLIPIDEMIA, UNSPECIFIED HYPERLIPIDEMIA TYPE: ICD-10-CM

## 2020-06-30 DIAGNOSIS — I10 HYPERTENSION, UNSPECIFIED TYPE: ICD-10-CM

## 2020-06-30 DIAGNOSIS — E55.9 VITAMIN D DEFICIENCY: ICD-10-CM

## 2020-06-30 DIAGNOSIS — F41.9 ANXIETY AND DEPRESSION: ICD-10-CM

## 2020-06-30 LAB
ANION GAP SERPL CALC-SCNC: 8 MMOL/L (ref 8–16)
BACTERIA #/AREA URNS AUTO: ABNORMAL /HPF
BILIRUB UR QL STRIP: NEGATIVE
BUN SERPL-MCNC: 20 MG/DL (ref 8–23)
CALCIUM SERPL-MCNC: 9.5 MG/DL (ref 8.7–10.5)
CAOX CRY UR QL COMP ASSIST: ABNORMAL
CHLORIDE SERPL-SCNC: 106 MMOL/L (ref 95–110)
CLARITY UR REFRACT.AUTO: ABNORMAL
CO2 SERPL-SCNC: 27 MMOL/L (ref 23–29)
COLOR UR AUTO: YELLOW
CREAT SERPL-MCNC: 1.4 MG/DL (ref 0.5–1.4)
EST. GFR  (AFRICAN AMERICAN): 43.3 ML/MIN/1.73 M^2
EST. GFR  (NON AFRICAN AMERICAN): 37.6 ML/MIN/1.73 M^2
GLUCOSE SERPL-MCNC: 82 MG/DL (ref 70–110)
GLUCOSE UR QL STRIP: NEGATIVE
HGB UR QL STRIP: NEGATIVE
HYALINE CASTS UR QL AUTO: 5 /LPF
KETONES UR QL STRIP: NEGATIVE
LEUKOCYTE ESTERASE UR QL STRIP: ABNORMAL
MICROSCOPIC COMMENT: ABNORMAL
NITRITE UR QL STRIP: NEGATIVE
PH UR STRIP: 6 [PH] (ref 5–8)
POTASSIUM SERPL-SCNC: 4.1 MMOL/L (ref 3.5–5.1)
PROT UR QL STRIP: NEGATIVE
RBC #/AREA URNS AUTO: 0 /HPF (ref 0–4)
SODIUM SERPL-SCNC: 141 MMOL/L (ref 136–145)
SP GR UR STRIP: 1.02 (ref 1–1.03)
SQUAMOUS #/AREA URNS AUTO: 0 /HPF
URN SPEC COLLECT METH UR: ABNORMAL
WBC #/AREA URNS AUTO: 11 /HPF (ref 0–5)

## 2020-06-30 PROCEDURE — 3078F PR MOST RECENT DIASTOLIC BLOOD PRESSURE < 80 MM HG: ICD-10-PCS | Mod: HCNC,CPTII,S$GLB, | Performed by: FAMILY MEDICINE

## 2020-06-30 PROCEDURE — 1126F PR PAIN SEVERITY QUANTIFIED, NO PAIN PRESENT: ICD-10-PCS | Mod: HCNC,S$GLB,, | Performed by: FAMILY MEDICINE

## 2020-06-30 PROCEDURE — 1101F PR PT FALLS ASSESS DOC 0-1 FALLS W/OUT INJ PAST YR: ICD-10-PCS | Mod: HCNC,CPTII,S$GLB, | Performed by: FAMILY MEDICINE

## 2020-06-30 PROCEDURE — 99499 RISK ADDL DX/OHS AUDIT: ICD-10-PCS | Mod: HCNC,S$GLB,, | Performed by: FAMILY MEDICINE

## 2020-06-30 PROCEDURE — 3008F PR BODY MASS INDEX (BMI) DOCUMENTED: ICD-10-PCS | Mod: HCNC,CPTII,S$GLB, | Performed by: FAMILY MEDICINE

## 2020-06-30 PROCEDURE — 36415 COLL VENOUS BLD VENIPUNCTURE: CPT | Mod: HCNC,PO

## 2020-06-30 PROCEDURE — 99214 PR OFFICE/OUTPT VISIT, EST, LEVL IV, 30-39 MIN: ICD-10-PCS | Mod: HCNC,S$GLB,, | Performed by: FAMILY MEDICINE

## 2020-06-30 PROCEDURE — 81001 URINALYSIS AUTO W/SCOPE: CPT | Mod: HCNC

## 2020-06-30 PROCEDURE — 1126F AMNT PAIN NOTED NONE PRSNT: CPT | Mod: HCNC,S$GLB,, | Performed by: FAMILY MEDICINE

## 2020-06-30 PROCEDURE — 3078F DIAST BP <80 MM HG: CPT | Mod: HCNC,CPTII,S$GLB, | Performed by: FAMILY MEDICINE

## 2020-06-30 PROCEDURE — 1101F PT FALLS ASSESS-DOCD LE1/YR: CPT | Mod: HCNC,CPTII,S$GLB, | Performed by: FAMILY MEDICINE

## 2020-06-30 PROCEDURE — 3074F SYST BP LT 130 MM HG: CPT | Mod: HCNC,CPTII,S$GLB, | Performed by: FAMILY MEDICINE

## 2020-06-30 PROCEDURE — 99999 PR PBB SHADOW E&M-EST. PATIENT-LVL IV: ICD-10-PCS | Mod: PBBFAC,HCNC,, | Performed by: FAMILY MEDICINE

## 2020-06-30 PROCEDURE — 1159F MED LIST DOCD IN RCRD: CPT | Mod: HCNC,S$GLB,, | Performed by: FAMILY MEDICINE

## 2020-06-30 PROCEDURE — 99214 OFFICE O/P EST MOD 30 MIN: CPT | Mod: HCNC,S$GLB,, | Performed by: FAMILY MEDICINE

## 2020-06-30 PROCEDURE — 99499 UNLISTED E&M SERVICE: CPT | Mod: HCNC,S$GLB,, | Performed by: FAMILY MEDICINE

## 2020-06-30 PROCEDURE — 3008F BODY MASS INDEX DOCD: CPT | Mod: HCNC,CPTII,S$GLB, | Performed by: FAMILY MEDICINE

## 2020-06-30 PROCEDURE — 80048 BASIC METABOLIC PNL TOTAL CA: CPT | Mod: HCNC

## 2020-06-30 PROCEDURE — 3074F PR MOST RECENT SYSTOLIC BLOOD PRESSURE < 130 MM HG: ICD-10-PCS | Mod: HCNC,CPTII,S$GLB, | Performed by: FAMILY MEDICINE

## 2020-06-30 PROCEDURE — 99999 PR PBB SHADOW E&M-EST. PATIENT-LVL IV: CPT | Mod: PBBFAC,HCNC,, | Performed by: FAMILY MEDICINE

## 2020-06-30 PROCEDURE — 1159F PR MEDICATION LIST DOCUMENTED IN MEDICAL RECORD: ICD-10-PCS | Mod: HCNC,S$GLB,, | Performed by: FAMILY MEDICINE

## 2020-06-30 RX ORDER — ALPRAZOLAM 0.5 MG/1
0.5 TABLET ORAL NIGHTLY PRN
Qty: 30 TABLET | Refills: 5 | Status: SHIPPED | OUTPATIENT
Start: 2020-06-30 | End: 2020-12-18 | Stop reason: SDUPTHER

## 2020-06-30 RX ORDER — CITALOPRAM 40 MG/1
40 TABLET, FILM COATED ORAL DAILY
Qty: 90 TABLET | Refills: 3 | Status: SHIPPED | OUTPATIENT
Start: 2020-06-30 | End: 2020-07-14

## 2020-06-30 NOTE — PROGRESS NOTES
"Subjective:      Patient ID: Tali Fitzpatrick is a 70 y.o. female.    Chief Complaint: Follow-up and Depression    HPI  71 yo with HTN, CKD, depression/anxiety here for f/u.  She is taking her meds, including the celexa and xanax.  She wants to see Psych//has trouble managing her feelings/aggravation with her 94 yo .  No H/S ideations  Has not been taking very good care of herself  Uses Xanax to help with sleep at bedtime  No CP/sOB  Bowels moving well    Past Medical History:   Diagnosis Date    Anxiety and depression     DJD (degenerative joint disease)     bilateral knees   Hyalgan series completed 8/27/14    Hyperlipidemia     Hypertension     Insomnia      Family History   Problem Relation Age of Onset    Arthritis Mother     Coronary artery disease Mother     Coronary artery disease Father     Colon cancer Neg Hx     Breast cancer Neg Hx     Diabetes Neg Hx      Past Surgical History:   Procedure Laterality Date    CATARACT EXTRACTION W/  INTRAOCULAR LENS IMPLANT      left    EYE SURGERY      LIPOSUCTION      TONSILLECTOMY       Social History     Tobacco Use    Smoking status: Never Smoker    Smokeless tobacco: Never Used   Substance Use Topics    Alcohol use: Yes     Alcohol/week: 0.0 standard drinks     Comment: social    Drug use: Yes     Types: Benzodiazepines       /70   Pulse 66   Temp 99.1 °F (37.3 °C) (Temporal)   Ht 5' 8" (1.727 m)   Wt 86.8 kg (191 lb 5.8 oz)   BMI 29.10 kg/m²     Review of Systems   Constitutional: Negative for activity change, appetite change, chills, diaphoresis, fatigue, fever and unexpected weight change.   HENT: Negative for ear pain, hearing loss, postnasal drip, rhinorrhea and tinnitus.    Eyes: Negative for visual disturbance.   Respiratory: Negative for cough, shortness of breath and wheezing.    Cardiovascular: Negative for chest pain, palpitations and leg swelling.   Gastrointestinal: Negative for abdominal distention, abdominal pain, " constipation and diarrhea.   Genitourinary: Negative for dysuria, frequency, hematuria and urgency.   Musculoskeletal: Negative for back pain and joint swelling.   Neurological: Negative for weakness and headaches.   Hematological: Negative for adenopathy.   Psychiatric/Behavioral: Positive for decreased concentration and dysphoric mood. Negative for confusion. The patient is nervous/anxious.        Objective:     Physical Exam  Vitals signs and nursing note reviewed.   Constitutional:       General: She is not in acute distress.     Appearance: She is well-developed.   HENT:      Right Ear: External ear normal.      Left Ear: External ear normal.   Eyes:      Conjunctiva/sclera: Conjunctivae normal.      Pupils: Pupils are equal, round, and reactive to light.   Neck:      Musculoskeletal: Normal range of motion and neck supple.      Thyroid: No thyromegaly.   Cardiovascular:      Rate and Rhythm: Normal rate and regular rhythm.      Heart sounds: Normal heart sounds. No murmur. No gallop.    Pulmonary:      Effort: Pulmonary effort is normal. No respiratory distress.      Breath sounds: Normal breath sounds. No wheezing or rales.   Abdominal:      General: Bowel sounds are normal. There is no distension.      Palpations: Abdomen is soft.      Tenderness: There is no abdominal tenderness. There is no guarding.   Musculoskeletal:      Right lower leg: No edema.      Left lower leg: No edema.   Skin:     General: Skin is warm and dry.      Findings: No rash.   Neurological:      Mental Status: She is alert and oriented to person, place, and time.      Cranial Nerves: No cranial nerve deficit.   Psychiatric:         Mood and Affect: Mood normal.         Behavior: Behavior normal.         Thought Content: Thought content normal.         Judgment: Judgment normal.         Lab Results   Component Value Date    WBC 5.03 12/31/2019    HGB 12.8 12/31/2019    HCT 40.8 12/31/2019     12/31/2019    CHOL 218 (H)  12/31/2019    TRIG 103 12/31/2019    HDL 67 12/31/2019    ALT 12 12/31/2019    AST 14 12/31/2019     12/31/2019    K 4.2 12/31/2019     12/31/2019    CREATININE 1.3 12/31/2019    BUN 19 12/31/2019    CO2 28 12/31/2019    TSH 1.726 12/31/2019       Assessment:     1. Stage 3 chronic kidney disease    2. Anxiety and depression    3. Hypertension, unspecified type    4. Hyperlipidemia, unspecified hyperlipidemia type    5. Vitamin D deficiency         Plan:     Stage 3 chronic kidney disease  -     URINALYSIS  -     CBC auto differential; Future; Expected date: 12/30/2020  -     Comprehensive metabolic panel; Future; Expected date: 12/30/2020    Anxiety and depression  -     Ambulatory referral/consult to Psychiatry; Future; Expected date: 07/07/2020    Hypertension, unspecified type    Hyperlipidemia, unspecified hyperlipidemia type  -     Lipid Panel; Future; Expected date: 12/30/2020  -     TSH; Future; Expected date: 12/30/2020    Vitamin D deficiency  -     Vitamin D; Future; Expected date: 12/30/2020    Other orders  -     citalopram (CELEXA) 40 MG tablet; Take 1 tablet (40 mg total) by mouth once daily.  Dispense: 90 tablet; Refill: 3  -     ALPRAZolam (XANAX) 0.5 MG tablet; Take 1 tablet (0.5 mg total) by mouth nightly as needed.  Dispense: 30 tablet; Refill: 5      BP stable//cont meds  CKD, update UA and BMP today.  Avoid NSAIDs/stay well hydrated with water intake  Cont celexa/Xanax/refills today  Will do Psych consult  Good self care  F/u 6 mos

## 2020-07-01 ENCOUNTER — CLINICAL SUPPORT (OUTPATIENT)
Dept: INTERNAL MEDICINE | Facility: CLINIC | Age: 70
End: 2020-07-01
Payer: MEDICARE

## 2020-07-01 ENCOUNTER — PATIENT MESSAGE (OUTPATIENT)
Dept: INTERNAL MEDICINE | Facility: CLINIC | Age: 70
End: 2020-07-01

## 2020-07-01 DIAGNOSIS — N39.0 URINARY TRACT INFECTION WITHOUT HEMATURIA, SITE UNSPECIFIED: ICD-10-CM

## 2020-07-01 DIAGNOSIS — N39.0 URINARY TRACT INFECTION WITHOUT HEMATURIA, SITE UNSPECIFIED: Primary | ICD-10-CM

## 2020-07-01 PROCEDURE — 99999 PR PBB SHADOW E&M-EST. PATIENT-LVL II: ICD-10-PCS | Mod: PBBFAC,HCNC,,

## 2020-07-01 PROCEDURE — 99999 PR PBB SHADOW E&M-EST. PATIENT-LVL II: CPT | Mod: PBBFAC,HCNC,,

## 2020-07-01 PROCEDURE — 87086 URINE CULTURE/COLONY COUNT: CPT | Mod: HCNC

## 2020-07-02 LAB
BACTERIA UR CULT: NORMAL
BACTERIA UR CULT: NORMAL

## 2020-07-14 ENCOUNTER — OFFICE VISIT (OUTPATIENT)
Dept: PSYCHIATRY | Facility: CLINIC | Age: 70
End: 2020-07-14
Payer: MEDICARE

## 2020-07-14 DIAGNOSIS — F41.1 GAD (GENERALIZED ANXIETY DISORDER): Primary | ICD-10-CM

## 2020-07-14 DIAGNOSIS — F32.A ANXIETY AND DEPRESSION: ICD-10-CM

## 2020-07-14 DIAGNOSIS — F41.9 ANXIETY AND DEPRESSION: ICD-10-CM

## 2020-07-14 PROCEDURE — 90792 PR PSYCHIATRIC DIAGNOSTIC EVALUATION W/MEDICAL SERVICES: ICD-10-PCS | Mod: HCNC,S$GLB,, | Performed by: PSYCHIATRY & NEUROLOGY

## 2020-07-14 PROCEDURE — 90792 PSYCH DIAG EVAL W/MED SRVCS: CPT | Mod: HCNC,S$GLB,, | Performed by: PSYCHIATRY & NEUROLOGY

## 2020-07-14 PROCEDURE — 99499 RISK ADDL DX/OHS AUDIT: ICD-10-PCS | Mod: S$GLB,,, | Performed by: PSYCHIATRY & NEUROLOGY

## 2020-07-14 PROCEDURE — 99999 PR PBB SHADOW E&M-EST. PATIENT-LVL II: CPT | Mod: PBBFAC,HCNC,, | Performed by: PSYCHIATRY & NEUROLOGY

## 2020-07-14 PROCEDURE — 99499 UNLISTED E&M SERVICE: CPT | Mod: S$GLB,,, | Performed by: PSYCHIATRY & NEUROLOGY

## 2020-07-14 PROCEDURE — 99999 PR PBB SHADOW E&M-EST. PATIENT-LVL II: ICD-10-PCS | Mod: PBBFAC,HCNC,, | Performed by: PSYCHIATRY & NEUROLOGY

## 2020-07-14 RX ORDER — FLUVOXAMINE MALEATE 50 MG/1
50 TABLET ORAL NIGHTLY
Qty: 30 TABLET | Refills: 2 | Status: SHIPPED | OUTPATIENT
Start: 2020-07-14 | End: 2021-03-19
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
## 2020-07-15 ENCOUNTER — PATIENT OUTREACH (OUTPATIENT)
Dept: ADMINISTRATIVE | Facility: HOSPITAL | Age: 70
End: 2020-07-15

## 2020-07-15 NOTE — PROGRESS NOTES
I am working the [employer] report patient's over due on hm:  I have attempted to contact patient to schedule her for the following: left voice mail     Mammogram-  Colonoscopy-  Pcp visit- 1-4-20  Awv -

## 2020-08-04 ENCOUNTER — PATIENT OUTREACH (OUTPATIENT)
Dept: ADMINISTRATIVE | Facility: HOSPITAL | Age: 70
End: 2020-08-04

## 2020-08-04 NOTE — PROGRESS NOTES
Contacted patient to follow up on overdue Colonoscopy. Patient states she is unable to schedule at this time her  is ill

## 2020-10-30 ENCOUNTER — PATIENT MESSAGE (OUTPATIENT)
Dept: ADMINISTRATIVE | Facility: HOSPITAL | Age: 70
End: 2020-10-30

## 2020-12-18 RX ORDER — ALPRAZOLAM 0.5 MG/1
0.5 TABLET ORAL NIGHTLY PRN
Qty: 30 TABLET | Refills: 5 | Status: SHIPPED | OUTPATIENT
Start: 2020-12-18 | End: 2021-07-13

## 2020-12-18 NOTE — TELEPHONE ENCOUNTER
----- Message from Malgorzata Rangel sent at 12/18/2020 12:29 PM CST -----  Regarding: refill  Contact: patient  Patient states that she needs refills  her meds, but only knows names Atenolol, pravastatin, amlodipine, Hydroxyzine, Xanax- [facility] drug store, please call her back of needed at [phone]

## 2020-12-18 NOTE — TELEPHONE ENCOUNTER
Notified pt that refills were sent to [facility] in August for a year on all medicines requested except xanax. Will send xanax request to Dr. Muniz. She verbalized understanding.  lv 6/30/20, f/u 1/29/21

## 2020-12-28 ENCOUNTER — LAB VISIT (OUTPATIENT)
Dept: LAB | Facility: HOSPITAL | Age: 70
End: 2020-12-28
Attending: FAMILY MEDICINE
Payer: MEDICARE

## 2020-12-28 DIAGNOSIS — E55.9 VITAMIN D DEFICIENCY: ICD-10-CM

## 2020-12-28 DIAGNOSIS — N18.30 STAGE 3 CHRONIC KIDNEY DISEASE: ICD-10-CM

## 2020-12-28 DIAGNOSIS — E78.5 HYPERLIPIDEMIA, UNSPECIFIED HYPERLIPIDEMIA TYPE: ICD-10-CM

## 2020-12-28 LAB
25(OH)D3+25(OH)D2 SERPL-MCNC: 28 NG/ML (ref 30–96)
ALBUMIN SERPL BCP-MCNC: 3.4 G/DL (ref 3.5–5.2)
ALP SERPL-CCNC: 65 U/L (ref 55–135)
ALT SERPL W/O P-5'-P-CCNC: 15 U/L (ref 10–44)
ANION GAP SERPL CALC-SCNC: 9 MMOL/L (ref 8–16)
AST SERPL-CCNC: 16 U/L (ref 10–40)
BASOPHILS # BLD AUTO: 0.06 K/UL (ref 0–0.2)
BASOPHILS NFR BLD: 1.1 % (ref 0–1.9)
BILIRUB SERPL-MCNC: 0.2 MG/DL (ref 0.1–1)
BUN SERPL-MCNC: 31 MG/DL (ref 8–23)
CALCIUM SERPL-MCNC: 9.1 MG/DL (ref 8.7–10.5)
CHLORIDE SERPL-SCNC: 110 MMOL/L (ref 95–110)
CHOLEST SERPL-MCNC: 180 MG/DL (ref 120–199)
CHOLEST/HDLC SERPL: 3 {RATIO} (ref 2–5)
CO2 SERPL-SCNC: 24 MMOL/L (ref 23–29)
CREAT SERPL-MCNC: 1.4 MG/DL (ref 0.5–1.4)
DIFFERENTIAL METHOD: ABNORMAL
EOSINOPHIL # BLD AUTO: 0.2 K/UL (ref 0–0.5)
EOSINOPHIL NFR BLD: 3 % (ref 0–8)
ERYTHROCYTE [DISTWIDTH] IN BLOOD BY AUTOMATED COUNT: 16.9 % (ref 11.5–14.5)
EST. GFR  (AFRICAN AMERICAN): 43.3 ML/MIN/1.73 M^2
EST. GFR  (NON AFRICAN AMERICAN): 37.6 ML/MIN/1.73 M^2
GLUCOSE SERPL-MCNC: 83 MG/DL (ref 70–110)
HCT VFR BLD AUTO: 38.7 % (ref 37–48.5)
HDLC SERPL-MCNC: 61 MG/DL (ref 40–75)
HDLC SERPL: 33.9 % (ref 20–50)
HGB BLD-MCNC: 11.8 G/DL (ref 12–16)
IMM GRANULOCYTES # BLD AUTO: 0 K/UL (ref 0–0.04)
IMM GRANULOCYTES NFR BLD AUTO: 0 % (ref 0–0.5)
LDLC SERPL CALC-MCNC: 103.2 MG/DL (ref 63–159)
LYMPHOCYTES # BLD AUTO: 2.2 K/UL (ref 1–4.8)
LYMPHOCYTES NFR BLD: 38.9 % (ref 18–48)
MCH RBC QN AUTO: 25.2 PG (ref 27–31)
MCHC RBC AUTO-ENTMCNC: 30.5 G/DL (ref 32–36)
MCV RBC AUTO: 83 FL (ref 82–98)
MONOCYTES # BLD AUTO: 0.5 K/UL (ref 0.3–1)
MONOCYTES NFR BLD: 8.2 % (ref 4–15)
NEUTROPHILS # BLD AUTO: 2.7 K/UL (ref 1.8–7.7)
NEUTROPHILS NFR BLD: 48.8 % (ref 38–73)
NONHDLC SERPL-MCNC: 119 MG/DL
NRBC BLD-RTO: 0 /100 WBC
PLATELET # BLD AUTO: 200 K/UL (ref 150–350)
PMV BLD AUTO: 11.8 FL (ref 9.2–12.9)
POTASSIUM SERPL-SCNC: 4.1 MMOL/L (ref 3.5–5.1)
PROT SERPL-MCNC: 7.2 G/DL (ref 6–8.4)
RBC # BLD AUTO: 4.69 M/UL (ref 4–5.4)
SODIUM SERPL-SCNC: 143 MMOL/L (ref 136–145)
T4 FREE SERPL-MCNC: 0.97 NG/DL (ref 0.71–1.51)
TRIGL SERPL-MCNC: 79 MG/DL (ref 30–150)
TSH SERPL DL<=0.005 MIU/L-ACNC: 4 UIU/ML (ref 0.4–4)
WBC # BLD AUTO: 5.6 K/UL (ref 3.9–12.7)

## 2020-12-28 PROCEDURE — 36415 COLL VENOUS BLD VENIPUNCTURE: CPT | Mod: HCNC,PO

## 2020-12-28 PROCEDURE — 84443 ASSAY THYROID STIM HORMONE: CPT | Mod: HCNC

## 2020-12-28 PROCEDURE — 82306 VITAMIN D 25 HYDROXY: CPT | Mod: HCNC

## 2020-12-28 PROCEDURE — 85025 COMPLETE CBC W/AUTO DIFF WBC: CPT | Mod: HCNC

## 2020-12-28 PROCEDURE — 80053 COMPREHEN METABOLIC PANEL: CPT | Mod: HCNC

## 2020-12-28 PROCEDURE — 84439 ASSAY OF FREE THYROXINE: CPT | Mod: HCNC

## 2020-12-28 PROCEDURE — 80061 LIPID PANEL: CPT | Mod: HCNC

## 2021-01-23 ENCOUNTER — CLINICAL SUPPORT (OUTPATIENT)
Dept: URGENT CARE | Facility: CLINIC | Age: 71
End: 2021-01-23
Payer: MEDICARE

## 2021-01-23 DIAGNOSIS — Z91.89 AT INCREASED RISK OF EXPOSURE TO COVID-19 VIRUS: Primary | ICD-10-CM

## 2021-01-23 LAB
CTP QC/QA: YES
SARS-COV-2 RDRP RESP QL NAA+PROBE: NEGATIVE

## 2021-01-23 PROCEDURE — U0002 COVID-19 LAB TEST NON-CDC: HCPCS | Mod: QW,S$GLB,, | Performed by: FAMILY MEDICINE

## 2021-01-23 PROCEDURE — U0002: ICD-10-PCS | Mod: QW,S$GLB,, | Performed by: FAMILY MEDICINE

## 2021-03-19 ENCOUNTER — LAB VISIT (OUTPATIENT)
Dept: LAB | Facility: HOSPITAL | Age: 71
End: 2021-03-19
Attending: FAMILY MEDICINE
Payer: MEDICARE

## 2021-03-19 ENCOUNTER — OFFICE VISIT (OUTPATIENT)
Dept: INTERNAL MEDICINE | Facility: CLINIC | Age: 71
End: 2021-03-19
Payer: MEDICARE

## 2021-03-19 VITALS
WEIGHT: 198.63 LBS | HEIGHT: 67 IN | SYSTOLIC BLOOD PRESSURE: 118 MMHG | HEART RATE: 66 BPM | BODY MASS INDEX: 31.18 KG/M2 | DIASTOLIC BLOOD PRESSURE: 76 MMHG | RESPIRATION RATE: 18 BRPM | TEMPERATURE: 98 F

## 2021-03-19 DIAGNOSIS — R79.89 ABNORMAL THYROID BLOOD TEST: ICD-10-CM

## 2021-03-19 DIAGNOSIS — I10 HYPERTENSION, UNSPECIFIED TYPE: ICD-10-CM

## 2021-03-19 DIAGNOSIS — E55.9 VITAMIN D DEFICIENCY: ICD-10-CM

## 2021-03-19 DIAGNOSIS — E66.9 OBESITY, UNSPECIFIED CLASSIFICATION, UNSPECIFIED OBESITY TYPE, UNSPECIFIED WHETHER SERIOUS COMORBIDITY PRESENT: ICD-10-CM

## 2021-03-19 DIAGNOSIS — Z12.11 COLON CANCER SCREENING: ICD-10-CM

## 2021-03-19 DIAGNOSIS — M19.90 OSTEOARTHRITIS, UNSPECIFIED OSTEOARTHRITIS TYPE, UNSPECIFIED SITE: ICD-10-CM

## 2021-03-19 DIAGNOSIS — N18.32 STAGE 3B CHRONIC KIDNEY DISEASE: ICD-10-CM

## 2021-03-19 DIAGNOSIS — Z00.00 ANNUAL PHYSICAL EXAM: Primary | ICD-10-CM

## 2021-03-19 DIAGNOSIS — E78.5 HYPERLIPIDEMIA, UNSPECIFIED HYPERLIPIDEMIA TYPE: ICD-10-CM

## 2021-03-19 DIAGNOSIS — F32.A ANXIETY AND DEPRESSION: ICD-10-CM

## 2021-03-19 DIAGNOSIS — F41.9 ANXIETY AND DEPRESSION: ICD-10-CM

## 2021-03-19 LAB
ANION GAP SERPL CALC-SCNC: 10 MMOL/L (ref 8–16)
BUN SERPL-MCNC: 18 MG/DL (ref 8–23)
CALCIUM SERPL-MCNC: 8.4 MG/DL (ref 8.7–10.5)
CHLORIDE SERPL-SCNC: 108 MMOL/L (ref 95–110)
CO2 SERPL-SCNC: 23 MMOL/L (ref 23–29)
CREAT SERPL-MCNC: 1.4 MG/DL (ref 0.5–1.4)
EST. GFR  (AFRICAN AMERICAN): 43.3 ML/MIN/1.73 M^2
EST. GFR  (NON AFRICAN AMERICAN): 37.6 ML/MIN/1.73 M^2
GLUCOSE SERPL-MCNC: 85 MG/DL (ref 70–110)
POTASSIUM SERPL-SCNC: 3.8 MMOL/L (ref 3.5–5.1)
SODIUM SERPL-SCNC: 141 MMOL/L (ref 136–145)
TSH SERPL DL<=0.005 MIU/L-ACNC: 3.25 UIU/ML (ref 0.4–4)

## 2021-03-19 PROCEDURE — 99397 PR PREVENTIVE VISIT,EST,65 & OVER: ICD-10-PCS | Mod: S$GLB,,, | Performed by: FAMILY MEDICINE

## 2021-03-19 PROCEDURE — 3008F PR BODY MASS INDEX (BMI) DOCUMENTED: ICD-10-PCS | Mod: CPTII,S$GLB,, | Performed by: FAMILY MEDICINE

## 2021-03-19 PROCEDURE — 3288F FALL RISK ASSESSMENT DOCD: CPT | Mod: CPTII,S$GLB,, | Performed by: FAMILY MEDICINE

## 2021-03-19 PROCEDURE — 3074F PR MOST RECENT SYSTOLIC BLOOD PRESSURE < 130 MM HG: ICD-10-PCS | Mod: CPTII,S$GLB,, | Performed by: FAMILY MEDICINE

## 2021-03-19 PROCEDURE — 1126F PR PAIN SEVERITY QUANTIFIED, NO PAIN PRESENT: ICD-10-PCS | Mod: S$GLB,,, | Performed by: FAMILY MEDICINE

## 2021-03-19 PROCEDURE — 1126F AMNT PAIN NOTED NONE PRSNT: CPT | Mod: S$GLB,,, | Performed by: FAMILY MEDICINE

## 2021-03-19 PROCEDURE — 3078F PR MOST RECENT DIASTOLIC BLOOD PRESSURE < 80 MM HG: ICD-10-PCS | Mod: CPTII,S$GLB,, | Performed by: FAMILY MEDICINE

## 2021-03-19 PROCEDURE — 1101F PR PT FALLS ASSESS DOC 0-1 FALLS W/OUT INJ PAST YR: ICD-10-PCS | Mod: CPTII,S$GLB,, | Performed by: FAMILY MEDICINE

## 2021-03-19 PROCEDURE — 3288F PR FALLS RISK ASSESSMENT DOCUMENTED: ICD-10-PCS | Mod: CPTII,S$GLB,, | Performed by: FAMILY MEDICINE

## 2021-03-19 PROCEDURE — 99999 PR PBB SHADOW E&M-EST. PATIENT-LVL IV: ICD-10-PCS | Mod: PBBFAC,,, | Performed by: FAMILY MEDICINE

## 2021-03-19 PROCEDURE — 1101F PT FALLS ASSESS-DOCD LE1/YR: CPT | Mod: CPTII,S$GLB,, | Performed by: FAMILY MEDICINE

## 2021-03-19 PROCEDURE — 84443 ASSAY THYROID STIM HORMONE: CPT | Mod: GA | Performed by: FAMILY MEDICINE

## 2021-03-19 PROCEDURE — 3074F SYST BP LT 130 MM HG: CPT | Mod: CPTII,S$GLB,, | Performed by: FAMILY MEDICINE

## 2021-03-19 PROCEDURE — 3078F DIAST BP <80 MM HG: CPT | Mod: CPTII,S$GLB,, | Performed by: FAMILY MEDICINE

## 2021-03-19 PROCEDURE — 36415 COLL VENOUS BLD VENIPUNCTURE: CPT | Mod: PO | Performed by: FAMILY MEDICINE

## 2021-03-19 PROCEDURE — 99397 PER PM REEVAL EST PAT 65+ YR: CPT | Mod: S$GLB,,, | Performed by: FAMILY MEDICINE

## 2021-03-19 PROCEDURE — 3008F BODY MASS INDEX DOCD: CPT | Mod: CPTII,S$GLB,, | Performed by: FAMILY MEDICINE

## 2021-03-19 PROCEDURE — 99999 PR PBB SHADOW E&M-EST. PATIENT-LVL IV: CPT | Mod: PBBFAC,,, | Performed by: FAMILY MEDICINE

## 2021-03-19 PROCEDURE — 80048 BASIC METABOLIC PNL TOTAL CA: CPT | Performed by: FAMILY MEDICINE

## 2021-03-19 RX ORDER — FLUTICASONE PROPIONATE 50 MCG
2 SPRAY, SUSPENSION (ML) NASAL DAILY
Qty: 16 G | Refills: 11 | Status: SHIPPED | OUTPATIENT
Start: 2021-03-19 | End: 2021-09-20 | Stop reason: SDUPTHER

## 2021-03-19 RX ORDER — CITALOPRAM 40 MG/1
TABLET, FILM COATED ORAL
COMMUNITY
Start: 2021-02-03 | End: 2021-07-19

## 2021-04-12 LAB — NONINV COLON CA DNA+OCC BLD SCRN STL QL: NEGATIVE

## 2021-07-01 ENCOUNTER — PATIENT MESSAGE (OUTPATIENT)
Dept: ADMINISTRATIVE | Facility: OTHER | Age: 71
End: 2021-07-01

## 2021-07-30 RX ORDER — AMOXICILLIN 500 MG/1
CAPSULE ORAL
COMMUNITY
Start: 2021-05-05 | End: 2021-09-20 | Stop reason: ALTCHOICE

## 2021-07-30 RX ORDER — CHLORHEXIDINE GLUCONATE ORAL RINSE 1.2 MG/ML
SOLUTION DENTAL
COMMUNITY
Start: 2021-05-07 | End: 2023-10-19 | Stop reason: ALTCHOICE

## 2021-07-30 RX ORDER — ALPRAZOLAM 0.5 MG/1
0.5 TABLET ORAL NIGHTLY PRN
Qty: 30 TABLET | Refills: 5 | OUTPATIENT
Start: 2021-07-30

## 2021-09-20 ENCOUNTER — OFFICE VISIT (OUTPATIENT)
Dept: INTERNAL MEDICINE | Facility: CLINIC | Age: 71
End: 2021-09-20
Payer: MEDICARE

## 2021-09-20 VITALS
BODY MASS INDEX: 29.69 KG/M2 | DIASTOLIC BLOOD PRESSURE: 74 MMHG | HEART RATE: 70 BPM | SYSTOLIC BLOOD PRESSURE: 132 MMHG | WEIGHT: 189.13 LBS | HEIGHT: 67 IN | TEMPERATURE: 98 F

## 2021-09-20 DIAGNOSIS — F32.A ANXIETY AND DEPRESSION: ICD-10-CM

## 2021-09-20 DIAGNOSIS — I10 HYPERTENSION, UNSPECIFIED TYPE: ICD-10-CM

## 2021-09-20 DIAGNOSIS — E78.5 HYPERLIPIDEMIA, UNSPECIFIED HYPERLIPIDEMIA TYPE: ICD-10-CM

## 2021-09-20 DIAGNOSIS — H61.23 BILATERAL IMPACTED CERUMEN: ICD-10-CM

## 2021-09-20 DIAGNOSIS — E55.9 VITAMIN D INSUFFICIENCY: ICD-10-CM

## 2021-09-20 DIAGNOSIS — R73.09 ABNORMAL GLUCOSE: ICD-10-CM

## 2021-09-20 DIAGNOSIS — F41.9 ANXIETY AND DEPRESSION: ICD-10-CM

## 2021-09-20 DIAGNOSIS — N18.31 STAGE 3A CHRONIC KIDNEY DISEASE: ICD-10-CM

## 2021-09-20 DIAGNOSIS — Z00.00 ANNUAL PHYSICAL EXAM: Primary | ICD-10-CM

## 2021-09-20 LAB
AMORPH CRY URNS QL MICRO: ABNORMAL
BACTERIA #/AREA URNS HPF: ABNORMAL /HPF
BILIRUB UR QL STRIP: NEGATIVE
CLARITY UR: CLEAR
COLOR UR: YELLOW
GLUCOSE UR QL STRIP: NEGATIVE
HGB UR QL STRIP: ABNORMAL
HYALINE CASTS #/AREA URNS LPF: 2 /LPF
KETONES UR QL STRIP: ABNORMAL
LEUKOCYTE ESTERASE UR QL STRIP: ABNORMAL
MICROSCOPIC COMMENT: ABNORMAL
NITRITE UR QL STRIP: NEGATIVE
PH UR STRIP: 6 [PH] (ref 5–8)
PROT UR QL STRIP: ABNORMAL
RBC #/AREA URNS HPF: 2 /HPF (ref 0–4)
SP GR UR STRIP: 1.02 (ref 1–1.03)
SQUAMOUS #/AREA URNS HPF: 2 /HPF
URN SPEC COLLECT METH UR: ABNORMAL
WBC #/AREA URNS HPF: 3 /HPF (ref 0–5)
WBC CLUMPS URNS QL MICRO: ABNORMAL

## 2021-09-20 PROCEDURE — 3075F PR MOST RECENT SYSTOLIC BLOOD PRESS GE 130-139MM HG: ICD-10-PCS | Mod: HCNC,CPTII,S$GLB, | Performed by: FAMILY MEDICINE

## 2021-09-20 PROCEDURE — 3075F SYST BP GE 130 - 139MM HG: CPT | Mod: HCNC,CPTII,S$GLB, | Performed by: FAMILY MEDICINE

## 2021-09-20 PROCEDURE — 1126F AMNT PAIN NOTED NONE PRSNT: CPT | Mod: HCNC,CPTII,S$GLB, | Performed by: FAMILY MEDICINE

## 2021-09-20 PROCEDURE — 3288F FALL RISK ASSESSMENT DOCD: CPT | Mod: HCNC,CPTII,S$GLB, | Performed by: FAMILY MEDICINE

## 2021-09-20 PROCEDURE — 1159F MED LIST DOCD IN RCRD: CPT | Mod: HCNC,CPTII,S$GLB, | Performed by: FAMILY MEDICINE

## 2021-09-20 PROCEDURE — 1101F PT FALLS ASSESS-DOCD LE1/YR: CPT | Mod: HCNC,CPTII,S$GLB, | Performed by: FAMILY MEDICINE

## 2021-09-20 PROCEDURE — 99999 PR PBB SHADOW E&M-EST. PATIENT-LVL IV: CPT | Mod: PBBFAC,HCNC,, | Performed by: FAMILY MEDICINE

## 2021-09-20 PROCEDURE — 1159F PR MEDICATION LIST DOCUMENTED IN MEDICAL RECORD: ICD-10-PCS | Mod: HCNC,CPTII,S$GLB, | Performed by: FAMILY MEDICINE

## 2021-09-20 PROCEDURE — 1126F PR PAIN SEVERITY QUANTIFIED, NO PAIN PRESENT: ICD-10-PCS | Mod: HCNC,CPTII,S$GLB, | Performed by: FAMILY MEDICINE

## 2021-09-20 PROCEDURE — 3078F PR MOST RECENT DIASTOLIC BLOOD PRESSURE < 80 MM HG: ICD-10-PCS | Mod: HCNC,CPTII,S$GLB, | Performed by: FAMILY MEDICINE

## 2021-09-20 PROCEDURE — 3078F DIAST BP <80 MM HG: CPT | Mod: HCNC,CPTII,S$GLB, | Performed by: FAMILY MEDICINE

## 2021-09-20 PROCEDURE — 3288F PR FALLS RISK ASSESSMENT DOCUMENTED: ICD-10-PCS | Mod: HCNC,CPTII,S$GLB, | Performed by: FAMILY MEDICINE

## 2021-09-20 PROCEDURE — 81000 URINALYSIS NONAUTO W/SCOPE: CPT | Mod: HCNC | Performed by: FAMILY MEDICINE

## 2021-09-20 PROCEDURE — 3008F BODY MASS INDEX DOCD: CPT | Mod: HCNC,CPTII,S$GLB, | Performed by: FAMILY MEDICINE

## 2021-09-20 PROCEDURE — 99499 UNLISTED E&M SERVICE: CPT | Mod: S$GLB,,, | Performed by: FAMILY MEDICINE

## 2021-09-20 PROCEDURE — 1160F RVW MEDS BY RX/DR IN RCRD: CPT | Mod: HCNC,CPTII,S$GLB, | Performed by: FAMILY MEDICINE

## 2021-09-20 PROCEDURE — 1101F PR PT FALLS ASSESS DOC 0-1 FALLS W/OUT INJ PAST YR: ICD-10-PCS | Mod: HCNC,CPTII,S$GLB, | Performed by: FAMILY MEDICINE

## 2021-09-20 PROCEDURE — 99499 RISK ADDL DX/OHS AUDIT: ICD-10-PCS | Mod: S$GLB,,, | Performed by: FAMILY MEDICINE

## 2021-09-20 PROCEDURE — 3008F PR BODY MASS INDEX (BMI) DOCUMENTED: ICD-10-PCS | Mod: HCNC,CPTII,S$GLB, | Performed by: FAMILY MEDICINE

## 2021-09-20 PROCEDURE — 99214 OFFICE O/P EST MOD 30 MIN: CPT | Mod: HCNC,S$GLB,, | Performed by: FAMILY MEDICINE

## 2021-09-20 PROCEDURE — 1160F PR REVIEW ALL MEDS BY PRESCRIBER/CLIN PHARMACIST DOCUMENTED: ICD-10-PCS | Mod: HCNC,CPTII,S$GLB, | Performed by: FAMILY MEDICINE

## 2021-09-20 PROCEDURE — 99999 PR PBB SHADOW E&M-EST. PATIENT-LVL IV: ICD-10-PCS | Mod: PBBFAC,HCNC,, | Performed by: FAMILY MEDICINE

## 2021-09-20 PROCEDURE — 99214 PR OFFICE/OUTPT VISIT, EST, LEVL IV, 30-39 MIN: ICD-10-PCS | Mod: HCNC,S$GLB,, | Performed by: FAMILY MEDICINE

## 2021-09-20 RX ORDER — FLUTICASONE PROPIONATE 50 MCG
2 SPRAY, SUSPENSION (ML) NASAL DAILY
Qty: 16 G | Refills: 11 | Status: SHIPPED | OUTPATIENT
Start: 2021-09-20

## 2021-09-21 ENCOUNTER — LAB VISIT (OUTPATIENT)
Dept: LAB | Facility: HOSPITAL | Age: 71
End: 2021-09-21
Attending: FAMILY MEDICINE
Payer: MEDICARE

## 2021-09-21 DIAGNOSIS — R73.09 ABNORMAL GLUCOSE: ICD-10-CM

## 2021-09-21 DIAGNOSIS — E78.5 HYPERLIPIDEMIA, UNSPECIFIED HYPERLIPIDEMIA TYPE: ICD-10-CM

## 2021-09-21 DIAGNOSIS — E55.9 VITAMIN D INSUFFICIENCY: ICD-10-CM

## 2021-09-21 DIAGNOSIS — I10 HYPERTENSION, UNSPECIFIED TYPE: ICD-10-CM

## 2021-09-21 DIAGNOSIS — N18.31 STAGE 3A CHRONIC KIDNEY DISEASE: ICD-10-CM

## 2021-09-21 LAB
25(OH)D3+25(OH)D2 SERPL-MCNC: 47 NG/ML (ref 30–96)
ALBUMIN SERPL BCP-MCNC: 3.7 G/DL (ref 3.5–5.2)
ALP SERPL-CCNC: 64 U/L (ref 55–135)
ALT SERPL W/O P-5'-P-CCNC: 12 U/L (ref 10–44)
ANION GAP SERPL CALC-SCNC: 12 MMOL/L (ref 8–16)
AST SERPL-CCNC: 16 U/L (ref 10–40)
BILIRUB SERPL-MCNC: 0.4 MG/DL (ref 0.1–1)
BUN SERPL-MCNC: 17 MG/DL (ref 8–23)
CALCIUM SERPL-MCNC: 9.6 MG/DL (ref 8.7–10.5)
CHLORIDE SERPL-SCNC: 107 MMOL/L (ref 95–110)
CHOLEST SERPL-MCNC: 191 MG/DL (ref 120–199)
CHOLEST/HDLC SERPL: 2.8 {RATIO} (ref 2–5)
CO2 SERPL-SCNC: 22 MMOL/L (ref 23–29)
CREAT SERPL-MCNC: 1.3 MG/DL (ref 0.5–1.4)
EST. GFR  (AFRICAN AMERICAN): 47 ML/MIN/1.73 M^2
EST. GFR  (NON AFRICAN AMERICAN): 40.8 ML/MIN/1.73 M^2
GLUCOSE SERPL-MCNC: 79 MG/DL (ref 70–110)
HDLC SERPL-MCNC: 68 MG/DL (ref 40–75)
HDLC SERPL: 35.6 % (ref 20–50)
LDLC SERPL CALC-MCNC: 108.4 MG/DL (ref 63–159)
NONHDLC SERPL-MCNC: 123 MG/DL
POTASSIUM SERPL-SCNC: 4.1 MMOL/L (ref 3.5–5.1)
PROT SERPL-MCNC: 7.5 G/DL (ref 6–8.4)
SODIUM SERPL-SCNC: 141 MMOL/L (ref 136–145)
TRIGL SERPL-MCNC: 73 MG/DL (ref 30–150)
TSH SERPL DL<=0.005 MIU/L-ACNC: 2.61 UIU/ML (ref 0.4–4)

## 2021-09-21 PROCEDURE — 83036 HEMOGLOBIN GLYCOSYLATED A1C: CPT | Mod: HCNC | Performed by: FAMILY MEDICINE

## 2021-09-21 PROCEDURE — 84443 ASSAY THYROID STIM HORMONE: CPT | Mod: HCNC | Performed by: FAMILY MEDICINE

## 2021-09-21 PROCEDURE — 80053 COMPREHEN METABOLIC PANEL: CPT | Mod: HCNC | Performed by: FAMILY MEDICINE

## 2021-09-21 PROCEDURE — 80061 LIPID PANEL: CPT | Mod: HCNC | Performed by: FAMILY MEDICINE

## 2021-09-21 PROCEDURE — 36415 COLL VENOUS BLD VENIPUNCTURE: CPT | Mod: HCNC,PO | Performed by: FAMILY MEDICINE

## 2021-09-21 PROCEDURE — 85025 COMPLETE CBC W/AUTO DIFF WBC: CPT | Mod: HCNC | Performed by: FAMILY MEDICINE

## 2021-09-21 PROCEDURE — 82306 VITAMIN D 25 HYDROXY: CPT | Mod: HCNC | Performed by: FAMILY MEDICINE

## 2021-09-22 ENCOUNTER — PATIENT MESSAGE (OUTPATIENT)
Dept: INTERNAL MEDICINE | Facility: CLINIC | Age: 71
End: 2021-09-22

## 2021-09-22 DIAGNOSIS — R71.8 MICROCYTOSIS: ICD-10-CM

## 2021-09-22 DIAGNOSIS — N18.31 STAGE 3A CHRONIC KIDNEY DISEASE: Primary | ICD-10-CM

## 2021-09-22 LAB
BASOPHILS # BLD AUTO: 0.05 K/UL (ref 0–0.2)
BASOPHILS NFR BLD: 0.8 % (ref 0–1.9)
DIFFERENTIAL METHOD: ABNORMAL
EOSINOPHIL # BLD AUTO: 0.2 K/UL (ref 0–0.5)
EOSINOPHIL NFR BLD: 2.3 % (ref 0–8)
ERYTHROCYTE [DISTWIDTH] IN BLOOD BY AUTOMATED COUNT: 17.6 % (ref 11.5–14.5)
ESTIMATED AVG GLUCOSE: 120 MG/DL (ref 68–131)
HBA1C MFR BLD: 5.8 % (ref 4–5.6)
HCT VFR BLD AUTO: 39.6 % (ref 37–48.5)
HGB BLD-MCNC: 12.4 G/DL (ref 12–16)
IMM GRANULOCYTES # BLD AUTO: 0.01 K/UL (ref 0–0.04)
IMM GRANULOCYTES NFR BLD AUTO: 0.2 % (ref 0–0.5)
LYMPHOCYTES # BLD AUTO: 1.8 K/UL (ref 1–4.8)
LYMPHOCYTES NFR BLD: 28.2 % (ref 18–48)
MCH RBC QN AUTO: 25.1 PG (ref 27–31)
MCHC RBC AUTO-ENTMCNC: 31.3 G/DL (ref 32–36)
MCV RBC AUTO: 80 FL (ref 82–98)
MONOCYTES # BLD AUTO: 0.4 K/UL (ref 0.3–1)
MONOCYTES NFR BLD: 5.8 % (ref 4–15)
NEUTROPHILS # BLD AUTO: 4.1 K/UL (ref 1.8–7.7)
NEUTROPHILS NFR BLD: 62.7 % (ref 38–73)
NRBC BLD-RTO: 0 /100 WBC
PLATELET # BLD AUTO: 225 K/UL (ref 150–450)
PMV BLD AUTO: 11.1 FL (ref 9.2–12.9)
RBC # BLD AUTO: 4.94 M/UL (ref 4–5.4)
WBC # BLD AUTO: 6.53 K/UL (ref 3.9–12.7)

## 2021-11-05 ENCOUNTER — OFFICE VISIT (OUTPATIENT)
Dept: INTERNAL MEDICINE | Facility: CLINIC | Age: 71
End: 2021-11-05
Payer: MEDICARE

## 2021-11-05 VITALS
HEART RATE: 64 BPM | WEIGHT: 194.25 LBS | TEMPERATURE: 98 F | DIASTOLIC BLOOD PRESSURE: 78 MMHG | HEIGHT: 67 IN | SYSTOLIC BLOOD PRESSURE: 130 MMHG | BODY MASS INDEX: 30.49 KG/M2

## 2021-11-05 DIAGNOSIS — I10 HYPERTENSION, UNSPECIFIED TYPE: ICD-10-CM

## 2021-11-05 DIAGNOSIS — F32.A ANXIETY AND DEPRESSION: ICD-10-CM

## 2021-11-05 DIAGNOSIS — H26.9 CATARACT OF RIGHT EYE, UNSPECIFIED CATARACT TYPE: ICD-10-CM

## 2021-11-05 DIAGNOSIS — N18.31 STAGE 3A CHRONIC KIDNEY DISEASE: ICD-10-CM

## 2021-11-05 DIAGNOSIS — E78.5 HYPERLIPIDEMIA, UNSPECIFIED HYPERLIPIDEMIA TYPE: ICD-10-CM

## 2021-11-05 DIAGNOSIS — H61.23 BILATERAL IMPACTED CERUMEN: ICD-10-CM

## 2021-11-05 DIAGNOSIS — Z01.818 PRE-OP EXAM: Primary | ICD-10-CM

## 2021-11-05 DIAGNOSIS — F41.9 ANXIETY AND DEPRESSION: ICD-10-CM

## 2021-11-05 PROCEDURE — 1159F PR MEDICATION LIST DOCUMENTED IN MEDICAL RECORD: ICD-10-PCS | Mod: HCNC,CPTII,S$GLB, | Performed by: NURSE PRACTITIONER

## 2021-11-05 PROCEDURE — 1126F AMNT PAIN NOTED NONE PRSNT: CPT | Mod: HCNC,CPTII,S$GLB, | Performed by: NURSE PRACTITIONER

## 2021-11-05 PROCEDURE — 99499 RISK ADDL DX/OHS AUDIT: ICD-10-PCS | Mod: S$GLB,,, | Performed by: NURSE PRACTITIONER

## 2021-11-05 PROCEDURE — 3008F BODY MASS INDEX DOCD: CPT | Mod: HCNC,CPTII,S$GLB, | Performed by: NURSE PRACTITIONER

## 2021-11-05 PROCEDURE — 99214 OFFICE O/P EST MOD 30 MIN: CPT | Mod: HCNC,S$GLB,, | Performed by: NURSE PRACTITIONER

## 2021-11-05 PROCEDURE — 1159F MED LIST DOCD IN RCRD: CPT | Mod: HCNC,CPTII,S$GLB, | Performed by: NURSE PRACTITIONER

## 2021-11-05 PROCEDURE — 1160F RVW MEDS BY RX/DR IN RCRD: CPT | Mod: HCNC,CPTII,S$GLB, | Performed by: NURSE PRACTITIONER

## 2021-11-05 PROCEDURE — 99999 PR PBB SHADOW E&M-EST. PATIENT-LVL III: CPT | Mod: PBBFAC,HCNC,, | Performed by: NURSE PRACTITIONER

## 2021-11-05 PROCEDURE — 3044F PR MOST RECENT HEMOGLOBIN A1C LEVEL <7.0%: ICD-10-PCS | Mod: HCNC,CPTII,S$GLB, | Performed by: NURSE PRACTITIONER

## 2021-11-05 PROCEDURE — 3075F PR MOST RECENT SYSTOLIC BLOOD PRESS GE 130-139MM HG: ICD-10-PCS | Mod: HCNC,CPTII,S$GLB, | Performed by: NURSE PRACTITIONER

## 2021-11-05 PROCEDURE — 99214 PR OFFICE/OUTPT VISIT, EST, LEVL IV, 30-39 MIN: ICD-10-PCS | Mod: HCNC,S$GLB,, | Performed by: NURSE PRACTITIONER

## 2021-11-05 PROCEDURE — 3078F PR MOST RECENT DIASTOLIC BLOOD PRESSURE < 80 MM HG: ICD-10-PCS | Mod: HCNC,CPTII,S$GLB, | Performed by: NURSE PRACTITIONER

## 2021-11-05 PROCEDURE — 99499 UNLISTED E&M SERVICE: CPT | Mod: S$GLB,,, | Performed by: NURSE PRACTITIONER

## 2021-11-05 PROCEDURE — 3008F PR BODY MASS INDEX (BMI) DOCUMENTED: ICD-10-PCS | Mod: HCNC,CPTII,S$GLB, | Performed by: NURSE PRACTITIONER

## 2021-11-05 PROCEDURE — 1126F PR PAIN SEVERITY QUANTIFIED, NO PAIN PRESENT: ICD-10-PCS | Mod: HCNC,CPTII,S$GLB, | Performed by: NURSE PRACTITIONER

## 2021-11-05 PROCEDURE — 3075F SYST BP GE 130 - 139MM HG: CPT | Mod: HCNC,CPTII,S$GLB, | Performed by: NURSE PRACTITIONER

## 2021-11-05 PROCEDURE — 1160F PR REVIEW ALL MEDS BY PRESCRIBER/CLIN PHARMACIST DOCUMENTED: ICD-10-PCS | Mod: HCNC,CPTII,S$GLB, | Performed by: NURSE PRACTITIONER

## 2021-11-05 PROCEDURE — 3078F DIAST BP <80 MM HG: CPT | Mod: HCNC,CPTII,S$GLB, | Performed by: NURSE PRACTITIONER

## 2021-11-05 PROCEDURE — 99999 PR PBB SHADOW E&M-EST. PATIENT-LVL III: ICD-10-PCS | Mod: PBBFAC,HCNC,, | Performed by: NURSE PRACTITIONER

## 2021-11-05 PROCEDURE — 3044F HG A1C LEVEL LT 7.0%: CPT | Mod: HCNC,CPTII,S$GLB, | Performed by: NURSE PRACTITIONER

## 2021-11-30 ENCOUNTER — OFFICE VISIT (OUTPATIENT)
Dept: PHYSICAL MEDICINE AND REHAB | Facility: CLINIC | Age: 71
End: 2021-11-30
Payer: MEDICARE

## 2021-11-30 VITALS — BODY MASS INDEX: 30.49 KG/M2 | HEIGHT: 67 IN | WEIGHT: 194.25 LBS

## 2021-11-30 DIAGNOSIS — M17.0 PRIMARY OSTEOARTHRITIS OF BOTH KNEES: Primary | ICD-10-CM

## 2021-11-30 PROCEDURE — 99999 PR PBB SHADOW E&M-EST. PATIENT-LVL III: ICD-10-PCS | Mod: PBBFAC,HCNC,, | Performed by: PHYSICAL MEDICINE & REHABILITATION

## 2021-11-30 PROCEDURE — 99999 PR PBB SHADOW E&M-EST. PATIENT-LVL III: CPT | Mod: PBBFAC,HCNC,, | Performed by: PHYSICAL MEDICINE & REHABILITATION

## 2021-11-30 PROCEDURE — 20611 DRAIN/INJ JOINT/BURSA W/US: CPT | Mod: 50,HCNC,S$GLB, | Performed by: PHYSICAL MEDICINE & REHABILITATION

## 2021-11-30 PROCEDURE — 99204 OFFICE O/P NEW MOD 45 MIN: CPT | Mod: 25,HCNC,GC,S$GLB | Performed by: PHYSICAL MEDICINE & REHABILITATION

## 2021-11-30 PROCEDURE — 99204 PR OFFICE/OUTPT VISIT, NEW, LEVL IV, 45-59 MIN: ICD-10-PCS | Mod: 25,HCNC,GC,S$GLB | Performed by: PHYSICAL MEDICINE & REHABILITATION

## 2021-11-30 PROCEDURE — 20611 LARGE JOINT ASPIRATION/INJECTION: BILATERAL KNEE: ICD-10-PCS | Mod: 50,HCNC,S$GLB, | Performed by: PHYSICAL MEDICINE & REHABILITATION

## 2021-11-30 RX ORDER — TRIAMCINOLONE ACETONIDE 40 MG/ML
40 INJECTION, SUSPENSION INTRA-ARTICULAR; INTRAMUSCULAR
Status: DISCONTINUED | OUTPATIENT
Start: 2021-11-30 | End: 2021-11-30 | Stop reason: HOSPADM

## 2021-11-30 RX ADMIN — TRIAMCINOLONE ACETONIDE 40 MG: 40 INJECTION, SUSPENSION INTRA-ARTICULAR; INTRAMUSCULAR at 10:11

## 2022-02-07 DIAGNOSIS — M25.561 KNEE PAIN, RIGHT: ICD-10-CM

## 2022-02-07 DIAGNOSIS — M17.0 PRIMARY OSTEOARTHRITIS OF BOTH KNEES: Primary | ICD-10-CM

## 2022-02-07 RX ORDER — MUPIROCIN 20 MG/G
OINTMENT TOPICAL
Status: CANCELLED | OUTPATIENT
Start: 2022-02-07

## 2022-02-09 RX ORDER — ALPRAZOLAM 0.5 MG/1
0.5 TABLET ORAL NIGHTLY PRN
Qty: 30 TABLET | Refills: 5 | Status: SHIPPED | OUTPATIENT
Start: 2022-02-09 | End: 2022-09-07 | Stop reason: SDUPTHER

## 2022-02-09 NOTE — TELEPHONE ENCOUNTER
No new care gaps identified.  Powered by [employer]. Reference number: [id number].   2/09/2022 11:37:49 AM CST

## 2022-02-16 DIAGNOSIS — Z01.818 PRE-OP TESTING: Primary | ICD-10-CM

## 2022-02-24 ENCOUNTER — TELEPHONE (OUTPATIENT)
Dept: PHYSICAL MEDICINE AND REHAB | Facility: CLINIC | Age: 72
End: 2022-02-24
Payer: MEDICARE

## 2022-03-01 ENCOUNTER — TELEPHONE (OUTPATIENT)
Dept: INTERNAL MEDICINE | Facility: CLINIC | Age: 72
End: 2022-03-01
Payer: MEDICARE

## 2022-03-01 NOTE — TELEPHONE ENCOUNTER
----- Message from Liz Negron sent at 3/1/2022  2:54 PM CST -----  Type:  RX Refill Request    Who Called: patient  Refill or New Rx:refill  RX Name and Strength Citalopram 40mg  How is the patient currently taking it? (ex. 1XDay):1xday  Is this a 30 day or 90 day RX:90day  Preferred Pharmacy with phone number:[facility]  Local or Mail Order:mail  Ordering Provider:Dr samayoa  Would the patient rather a call back or a response via [facility]? Call back  Best Call Back Number:[phone]  Additional Information: na

## 2022-03-01 NOTE — TELEPHONE ENCOUNTER
No new care gaps identified.  Powered by [employer]. Reference number: [id number].   3/01/2022 2:49:48 PM CST

## 2022-03-02 ENCOUNTER — LAB VISIT (OUTPATIENT)
Dept: FAMILY MEDICINE | Facility: CLINIC | Age: 72
End: 2022-03-02
Payer: MEDICARE

## 2022-03-02 DIAGNOSIS — Z01.818 PRE-OP TESTING: ICD-10-CM

## 2022-03-02 PROCEDURE — U0003 INFECTIOUS AGENT DETECTION BY NUCLEIC ACID (DNA OR RNA); SEVERE ACUTE RESPIRATORY SYNDROME CORONAVIRUS 2 (SARS-COV-2) (CORONAVIRUS DISEASE [COVID-19]), AMPLIFIED PROBE TECHNIQUE, MAKING USE OF HIGH THROUGHPUT TECHNOLOGIES AS DESCRIBED BY CMS-2020-01-R: HCPCS | Mod: HCNC | Performed by: PHYSICAL MEDICINE & REHABILITATION

## 2022-03-02 PROCEDURE — U0005 INFEC AGEN DETEC AMPLI PROBE: HCPCS | Performed by: PHYSICAL MEDICINE & REHABILITATION

## 2022-03-03 LAB
SARS-COV-2 RNA RESP QL NAA+PROBE: NOT DETECTED
SARS-COV-2- CYCLE NUMBER: NORMAL

## 2022-03-04 ENCOUNTER — HOSPITAL ENCOUNTER (OUTPATIENT)
Dept: RADIOLOGY | Facility: HOSPITAL | Age: 72
Discharge: HOME | End: 2022-03-04
Attending: PHYSICAL MEDICINE & REHABILITATION
Payer: MEDICARE

## 2022-03-04 ENCOUNTER — HOSPITAL ENCOUNTER (OUTPATIENT)
Facility: HOSPITAL | Age: 72
Discharge: HOME | End: 2022-03-04
Attending: PHYSICAL MEDICINE & REHABILITATION | Admitting: PHYSICAL MEDICINE & REHABILITATION
Payer: MEDICARE

## 2022-03-04 DIAGNOSIS — M25.561 RIGHT KNEE PAIN: ICD-10-CM

## 2022-03-04 DIAGNOSIS — M25.561 KNEE PAIN, RIGHT: ICD-10-CM

## 2022-03-04 DIAGNOSIS — M17.0 PRIMARY OSTEOARTHRITIS OF BOTH KNEES: ICD-10-CM

## 2022-03-04 PROCEDURE — 64640 INJECTION TREATMENT OF NERVE: CPT | Mod: HCNC,PO | Performed by: PHYSICAL MEDICINE & REHABILITATION

## 2022-03-04 PROCEDURE — 99152 PR MOD CONSCIOUS SEDATION, SAME PHYS, 5+ YRS, FIRST 15 MIN: ICD-10-PCS | Mod: HCNC,,, | Performed by: PHYSICAL MEDICINE & REHABILITATION

## 2022-03-04 PROCEDURE — 25000003 PHARM REV CODE 250: Mod: HCNC,PO | Performed by: PHYSICAL MEDICINE & REHABILITATION

## 2022-03-04 PROCEDURE — 64624 DSTRJ NULYT AGT GNCLR NRV: CPT | Mod: HCNC,PO,RT | Performed by: PHYSICAL MEDICINE & REHABILITATION

## 2022-03-04 PROCEDURE — 76000 FLUOROSCOPY <1 HR PHYS/QHP: CPT | Mod: TC,HCNC,PO

## 2022-03-04 PROCEDURE — 99152 MOD SED SAME PHYS/QHP 5/>YRS: CPT | Mod: HCNC,,, | Performed by: PHYSICAL MEDICINE & REHABILITATION

## 2022-03-04 PROCEDURE — 63600175 PHARM REV CODE 636 W HCPCS: Mod: HCNC,PO | Performed by: PHYSICAL MEDICINE & REHABILITATION

## 2022-03-04 PROCEDURE — 64624 PR DESTRUCT, NEUROLYTIC AGENT, GENICULAR NERVE, W/IMG: ICD-10-PCS | Mod: HCNC,RT,, | Performed by: PHYSICAL MEDICINE & REHABILITATION

## 2022-03-04 PROCEDURE — 64624 DSTRJ NULYT AGT GNCLR NRV: CPT | Mod: HCNC,RT,, | Performed by: PHYSICAL MEDICINE & REHABILITATION

## 2022-03-04 RX ORDER — MIDAZOLAM HYDROCHLORIDE 1 MG/ML
INJECTION INTRAMUSCULAR; INTRAVENOUS
Status: DISCONTINUED | OUTPATIENT
Start: 2022-03-04 | End: 2022-03-04 | Stop reason: HOSPADM

## 2022-03-04 RX ORDER — LIDOCAINE HYDROCHLORIDE 10 MG/ML
INJECTION, SOLUTION EPIDURAL; INFILTRATION; INTRACAUDAL; PERINEURAL
Status: DISCONTINUED | OUTPATIENT
Start: 2022-03-04 | End: 2022-03-04 | Stop reason: HOSPADM

## 2022-03-04 RX ORDER — FENTANYL CITRATE 50 UG/ML
INJECTION, SOLUTION INTRAMUSCULAR; INTRAVENOUS
Status: DISCONTINUED | OUTPATIENT
Start: 2022-03-04 | End: 2022-03-04 | Stop reason: HOSPADM

## 2022-03-04 RX ORDER — LIDOCAINE HYDROCHLORIDE 20 MG/ML
INJECTION, SOLUTION INFILTRATION; PERINEURAL
Status: DISCONTINUED | OUTPATIENT
Start: 2022-03-04 | End: 2022-03-04 | Stop reason: HOSPADM

## 2022-03-04 RX ORDER — DEXAMETHASONE SODIUM PHOSPHATE 4 MG/ML
INJECTION, SOLUTION INTRA-ARTICULAR; INTRALESIONAL; INTRAMUSCULAR; INTRAVENOUS; SOFT TISSUE
Status: DISCONTINUED | OUTPATIENT
Start: 2022-03-04 | End: 2022-03-04 | Stop reason: HOSPADM

## 2022-03-04 RX ORDER — MUPIROCIN 20 MG/G
OINTMENT TOPICAL
Status: DISCONTINUED | OUTPATIENT
Start: 2022-03-04 | End: 2022-03-04 | Stop reason: HOSPADM

## 2022-03-04 RX ADMIN — MUPIROCIN: 20 OINTMENT TOPICAL at 11:03

## 2022-03-04 NOTE — DISCHARGE INSTRUCTIONS
PAIN MANAGEMENT    Home care instructions   Apply ice pack to the injection site for 20 minute prior for the first 24 hours for soreness/discomfort at injection site   DO NOT USE HEAT FOR 24 HOURS   Keep site clean and dry for 24 hours, remove bandaid when desired   Do not drive until tomorrow  Take care when walking after a lumbar injection     STEROIDS OR RADIOFREQUENCY    May take 10-14 days for full effects  Avoid strenuous exercises for 2 days        CALL PHYSICIAN FOR:  Severe increase in your usual pain or appearance of new pain  Prolonged or increasing weakness or numbness in the legs or arms  Fever greater then 100 degrees F..  Drainage from the incision site, redness, active bleeding or increased swelling at the injection site  Headache that increases when your head is upright and decreases when you lie flat    FOR EMERGENCIES:   Go directly to Emergency Department for Shortness of breath, chest pain, or problems breathing

## 2022-03-04 NOTE — OP NOTE
Operative Note       Surgery Date: 3/4/2022     Surgeon(s) and Role:     * Deon Ragland MD - Primary    Pre-op Diagnosis:  Primary osteoarthritis of the right knee    Post-op Diagnosis: Post-Op Diagnosis Codes:     * Primary osteoarthritis of the right knee    Procedure:  1. Right knee genicular nerve radiofrequency ablation x3 nerve branches.  A. Inferomedial genicular nerve  B. Superolateral genicular nerve  C. Superomedial genicular nerve   2.  Fluoroscopic guidance.    Anesthesia: RN IV Sedation    Description of Procedure:    Tali Fitzpatrick is a 71 y.o. female with chronic right knee pain who presents for an elective radiofrequency genicular nerve ablation of a total of 3 individual genicular branches of the right knee. We discussed risks, benefits, and alternatives. Patient's verbal and written consent was obtained. She was brought to the fluoroscopic suite, placed in supine position. The right knee was prepped and draped in the usual sterile fashion. 1% lidocaine was used to anesthetize the overlying subcutaneous cutaneous tissues at each of the 3 sites using a 27-gauge 1.5 inch needle on a 10 cc syringe. Using AP and lateral views, a 17-gauge introducer needle was guided under intermittent fluoroscopic imaging first to the distal metaphysis of the medial aspect of the femur. A similar needle was placed at the lateral aspect of the distal femur. A third introducer needle was guided to the proximal aspect of the tibial metaphysis medially. All 3 needles being placed near or around each genicular nerve origin prior to entering the knee joint. Motor stimulation was then performed at 2 V. There were no observed muscle contractions at either of the 3 sites. Following motor stimulation 1 cc of 2% lidocaine was then injected into the introducer needles and stylets were replaced. Next, the radio frequency probe was placed into the introducer needle and radiofrequency lesioning was performed at 84°C for 2  minutes, 30 seconds. Next, the probe was removed and placed in the second genicular location and radiofrequency lesioning was performed here. Finally, the third radio frequency lesioning was performed at the last branch. Following lesioning, a mixture of 1 cc of dexamethasone, 4 mg with 3cc 1% lidocaine was injected through the introducer needles. The needles were then re-styletted and removed. A total of 3 nerve branches were lesioned today: Inferomedial genicular nerve, Superolateral genicular nerve, and superomedial genicular nerve . Band-Aids were applied to the puncture sites after applying antibiotic ointment. The patient was transported to the postoperative recovery area in good condition. Approximately 20 minutes after the procedure, motor strength examination revealed no weakness. The patient reported no paresthesias in the affected extremity. Patient was discharged in good condition and will follow-up in 2 weeks in the office.      Estimated Blood Loss: Minimal    Attestation:  I performed the procedure.    The patient was given conscious sedation by a registered nurse with me in attendance. The agents used were fentanyl and Versed. There was continuous monitoring of EKG, blood pressure and pulse oximetry. Total time for conscious sedation was 31 minutes.          Discharge Note    Admit Date: 3/4/2022    Attending Physician: Deon Ragland MD     Discharge Physician: Deon Ragland MD    Final Diagnosis: Post-Op Diagnosis Codes:     * Primary osteoarthritis of both knees [M17.0]    Disposition: Home or Self Care, pt discharged in good condition and will f/u in clinic in 2 weeks.    Patient Instructions:   Current Discharge Medication List      CONTINUE these medications which have NOT CHANGED    Details   ALPRAZolam (XANAX) 0.5 MG tablet Take 1 tablet (0.5 mg total) by mouth nightly as needed.  Qty: 30 tablet, Refills: 5      amLODIPine (NORVASC) 5 MG tablet TAKE 1 TABLET EVERY DAY  Qty: 90  tablet, Refills: 3      atenoloL (TENORMIN) 50 MG tablet TAKE 1 TABLET EVERY DAY  Qty: 90 tablet, Refills: 3      citalopram (CELEXA) 40 MG tablet TAKE 1 TABLET (40 MG TOTAL) BY MOUTH ONCE DAILY.  Qty: 90 tablet, Refills: 1      cyanocobalamin (VITAMIN B-12) 1000 MCG tablet Take 1,000 mcg by mouth once daily.      fluticasone propionate (FLONASE) 50 mcg/actuation nasal spray 2 sprays (100 mcg total) by Each Nostril route once daily.  Qty: 16 g, Refills: 11      folic acid/multivit-min/lutein (CENTRUM SILVER ORAL) Take 1 tablet by mouth once daily.      hydrOXYzine pamoate (VISTARIL) 50 MG Cap TAKE 1 CAPSULE EVERY 8 (EIGHT) HOURS AS NEEDED.  Qty: 120 capsule, Refills: 2      pravastatin (PRAVACHOL) 20 MG tablet TAKE 1 TABLET EVERY DAY  Qty: 90 tablet, Refills: 3      chlorhexidine (PERIDEX) 0.12 % solution place 15 milliliters in the mouth by mucous membrane route 2 times per day after brushing teeth, swish in mouth for 30 seconds then spit out             Discharge Procedure Orders (must include Diet, Follow-up, Activity)   Discharge Procedure Orders (must include Diet, Follow-up, Activity)   Diet general     Ice to affected area     Call MD for:  temperature >100.4     Call MD for:  persistent nausea and vomiting     Call MD for:  severe uncontrolled pain     Call MD for:  difficulty breathing, headache or visual disturbances     Call MD for:  redness, tenderness, or signs of infection (pain, swelling, redness, odor or green/yellow discharge around incision site)     Call MD for:  hives     Call MD for:  persistent dizziness or light-headedness     Call MD for:  extreme fatigue     No dressing needed     Shower on day dressing removed (No bath)        Discharge Date: 03/04/2022

## 2022-03-04 NOTE — H&P
CHIEF COMPLAINT:   Chief Complaint   Patient presents with    Knee Pain       Bilateral knee pain; anterior knee pain; had burning of nerves in the past.       HISTORY OF PRESENT ILLNESS: Tali Fitzpatrick is a 71 y.o. female who presents to me for scheduled RFA of the right knee.     Review of Systems   Constitutional: Negative for fever.   HENT: Negative for drooling.    Eyes: Negative for discharge.   Respiratory: Negative for choking.    Cardiovascular: Negative for chest pain.   Genitourinary: Negative for flank pain.   Skin: Negative for wound.   Allergic/Immunologic: Negative for immunocompromised state.   Neurological: Negative for tremors and syncope.   Psychiatric/Behavioral: Negative for behavioral problems.      Past Medical History:        Past Medical History:   Diagnosis Date    Anxiety and depression      CKD (chronic kidney disease)      DJD (degenerative joint disease)       bilateral knees   Hyalgan series completed 8/27/14    Hyperlipidemia      Hypertension      Insomnia           Past Surgical History:         Past Surgical History:   Procedure Laterality Date    CATARACT EXTRACTION W/  INTRAOCULAR LENS IMPLANT         left    EYE SURGERY        LIPOSUCTION        TONSILLECTOMY             Family History:   Family History   Problem Relation Age of Onset    Arthritis Mother      Coronary artery disease Mother      Coronary artery disease Father      Colon cancer Neg Hx      Breast cancer Neg Hx      Diabetes Neg Hx           Medications:          Current Outpatient Medications on File Prior to Visit   Medication Sig Dispense Refill    ALPRAZolam (XANAX) 0.5 MG tablet Take 1 tablet (0.5 mg total) by mouth nightly as needed. 30 tablet 5    amLODIPine (NORVASC) 5 MG tablet TAKE 1 TABLET EVERY DAY 90 tablet 3    atenoloL (TENORMIN) 50 MG tablet TAKE 1 TABLET EVERY DAY 90 tablet 3    chlorhexidine (PERIDEX) 0.12 % solution place 15 milliliters in the mouth by mucous membrane route  2 times per day after brushing teeth, swish in mouth for 30 seconds then spit out        citalopram (CELEXA) 40 MG tablet TAKE 1 TABLET (40 MG TOTAL) BY MOUTH ONCE DAILY. 90 tablet 1    cyanocobalamin (VITAMIN B-12) 1000 MCG tablet Take 1,000 mcg by mouth once daily.        fluticasone propionate (FLONASE) 50 mcg/actuation nasal spray 2 sprays (100 mcg total) by Each Nostril route once daily. 16 g 11    folic acid/multivit-min/lutein (CENTRUM SILVER ORAL) Take 1 tablet by mouth once daily.        hydrOXYzine pamoate (VISTARIL) 50 MG Cap TAKE 1 CAPSULE EVERY 8 (EIGHT) HOURS AS NEEDED. 120 capsule 2    pravastatin (PRAVACHOL) 20 MG tablet TAKE 1 TABLET EVERY DAY 90 tablet 3      No current facility-administered medications on file prior to visit.         Allergies:         Review of patient's allergies indicates:   Allergen Reactions    Micardis [telmisartan]         Can't remember         Social History:   Social History            Socioeconomic History    Marital status:     Number of children: 1   Occupational History    Occupation: Retired   Tobacco Use    Smoking status: Never Smoker    Smokeless tobacco: Never Used   Substance and Sexual Activity    Alcohol use: Yes       Alcohol/week: 0.0 standard drinks       Comment: social    Drug use: Yes       Types: Benzodiazepines      PHYSICAL EXAMINATION:   General           VSS   Constitutional: Oriented to person, place, and time. No apparent distress. Pleasant.  HENT:   Head: Normocephalic and atraumatic.   Eyes: Right eye exhibits no discharge. Left eye exhibits no discharge. No scleral icterus.   Pulmonary/Chest: Effort normal. No respiratory distress.   Abdominal: There is no guarding.   Neurological: Alert and oriented to person, place, and time.   Psychiatric: Behavior is normal.   Right Knee Exam      Tenderness   The patient is experiencing tenderness in the medial joint line and pes anserinus.     Range of Motion   Right knee extension:  -3.   Flexion: 130      Tests   Mary:  Medial - negative Lateral - negative  Varus: negative Valgus: negative  Lachman:  Anterior - negative    Posterior - negative  Patellar apprehension: negative     Other   Erythema: absent  Scars: absent  Sensation: normal  Pulse: present  Effusion: no effusion present        Left Knee Exam      Tenderness   The patient is experiencing tenderness in the medial joint line and pes anserinus.     Range of Motion   Left knee extension: -1.   Flexion: 130      Tests   Mary:  Medial - negative Lateral - negative  Varus: negative Valgus: negative  Lachman:  Anterior - negative    Posterior - negative  Patellar apprehension: negative     Other   Erythema: absent  Scars: absent  Sensation: normal  Pulse: present  Effusion: no effusion present      INSPECTION: There is no swelling, ecchymoses, erythema or gross deformity.     Imaging  Bilateral Knee XRays 2017  AP and PA flexion standing views of both knees as well as a lateral and Merchant views of the left and a Merchant view of the right knee were obtained. There is lateral patellar tilt bilaterally. There is left greater than right medial joint space narrowing. Tricompartment osteophyte formation is noted. I do not see evidence of a significant left-sided joint effusion.  IMPRESSION:    Left greater than right tricompartmental knee osteoarthritis with mild progression relative to June 22, 2017     Data Reviewed: X-ray     Supportive Actions: Independent visualization of images or test specimens     ASSESSMENT:   Right knee pain     PLAN:      1. We will proceed today with RFA of the right knee.Pt consented.     2. Mallampati score II, ASA 2.     3. RTC prn.

## 2022-03-07 VITALS
HEIGHT: 68 IN | TEMPERATURE: 98 F | WEIGHT: 192 LBS | OXYGEN SATURATION: 99 % | HEART RATE: 64 BPM | DIASTOLIC BLOOD PRESSURE: 71 MMHG | BODY MASS INDEX: 29.1 KG/M2 | SYSTOLIC BLOOD PRESSURE: 151 MMHG | RESPIRATION RATE: 18 BRPM

## 2022-03-08 RX ORDER — CITALOPRAM 40 MG/1
TABLET, FILM COATED ORAL
Qty: 90 TABLET | Refills: 1 | Status: SHIPPED | OUTPATIENT
Start: 2022-03-08 | End: 2022-10-10

## 2022-03-11 ENCOUNTER — LAB VISIT (OUTPATIENT)
Dept: LAB | Facility: HOSPITAL | Age: 72
End: 2022-03-11
Attending: FAMILY MEDICINE
Payer: MEDICARE

## 2022-03-11 DIAGNOSIS — R71.8 MICROCYTOSIS: ICD-10-CM

## 2022-03-11 DIAGNOSIS — N18.31 STAGE 3A CHRONIC KIDNEY DISEASE: ICD-10-CM

## 2022-03-11 LAB
ANION GAP SERPL CALC-SCNC: 11 MMOL/L (ref 8–16)
BASOPHILS # BLD AUTO: 0.05 K/UL (ref 0–0.2)
BASOPHILS NFR BLD: 1 % (ref 0–1.9)
BUN SERPL-MCNC: 20 MG/DL (ref 8–23)
CALCIUM SERPL-MCNC: 9.3 MG/DL (ref 8.7–10.5)
CHLORIDE SERPL-SCNC: 111 MMOL/L (ref 95–110)
CO2 SERPL-SCNC: 22 MMOL/L (ref 23–29)
CREAT SERPL-MCNC: 1.2 MG/DL (ref 0.5–1.4)
DIFFERENTIAL METHOD: ABNORMAL
EOSINOPHIL # BLD AUTO: 0.2 K/UL (ref 0–0.5)
EOSINOPHIL NFR BLD: 3.1 % (ref 0–8)
ERYTHROCYTE [DISTWIDTH] IN BLOOD BY AUTOMATED COUNT: 17.8 % (ref 11.5–14.5)
EST. GFR  (AFRICAN AMERICAN): 51.8 ML/MIN/1.73 M^2
EST. GFR  (NON AFRICAN AMERICAN): 44.9 ML/MIN/1.73 M^2
FERRITIN SERPL-MCNC: 42 NG/ML (ref 20–300)
GLUCOSE SERPL-MCNC: 64 MG/DL (ref 70–110)
HCT VFR BLD AUTO: 40.2 % (ref 37–48.5)
HGB BLD-MCNC: 12 G/DL (ref 12–16)
IMM GRANULOCYTES # BLD AUTO: 0 K/UL (ref 0–0.04)
IMM GRANULOCYTES NFR BLD AUTO: 0 % (ref 0–0.5)
IRON SERPL-MCNC: 97 UG/DL (ref 30–160)
LYMPHOCYTES # BLD AUTO: 1.7 K/UL (ref 1–4.8)
LYMPHOCYTES NFR BLD: 34.5 % (ref 18–48)
MCH RBC QN AUTO: 24.8 PG (ref 27–31)
MCHC RBC AUTO-ENTMCNC: 29.9 G/DL (ref 32–36)
MCV RBC AUTO: 83 FL (ref 82–98)
MONOCYTES # BLD AUTO: 0.4 K/UL (ref 0.3–1)
MONOCYTES NFR BLD: 7.8 % (ref 4–15)
NEUTROPHILS # BLD AUTO: 2.6 K/UL (ref 1.8–7.7)
NEUTROPHILS NFR BLD: 53.6 % (ref 38–73)
NRBC BLD-RTO: 0 /100 WBC
PLATELET # BLD AUTO: 228 K/UL (ref 150–450)
PMV BLD AUTO: 11.7 FL (ref 9.2–12.9)
POTASSIUM SERPL-SCNC: 4.1 MMOL/L (ref 3.5–5.1)
RBC # BLD AUTO: 4.83 M/UL (ref 4–5.4)
SATURATED IRON: 31 % (ref 20–50)
SODIUM SERPL-SCNC: 144 MMOL/L (ref 136–145)
TOTAL IRON BINDING CAPACITY: 309 UG/DL (ref 250–450)
TRANSFERRIN SERPL-MCNC: 209 MG/DL (ref 200–375)
WBC # BLD AUTO: 4.9 K/UL (ref 3.9–12.7)

## 2022-03-11 PROCEDURE — 82728 ASSAY OF FERRITIN: CPT | Performed by: FAMILY MEDICINE

## 2022-03-11 PROCEDURE — 84466 ASSAY OF TRANSFERRIN: CPT | Performed by: FAMILY MEDICINE

## 2022-03-11 PROCEDURE — 85025 COMPLETE CBC W/AUTO DIFF WBC: CPT | Performed by: FAMILY MEDICINE

## 2022-03-11 PROCEDURE — 80048 BASIC METABOLIC PNL TOTAL CA: CPT | Performed by: FAMILY MEDICINE

## 2022-03-11 PROCEDURE — 36415 COLL VENOUS BLD VENIPUNCTURE: CPT | Mod: PO | Performed by: FAMILY MEDICINE

## 2022-03-17 ENCOUNTER — PATIENT OUTREACH (OUTPATIENT)
Dept: ADMINISTRATIVE | Facility: OTHER | Age: 72
End: 2022-03-17
Payer: MEDICARE

## 2022-03-17 ENCOUNTER — OFFICE VISIT (OUTPATIENT)
Dept: PHYSICAL MEDICINE AND REHAB | Facility: CLINIC | Age: 72
End: 2022-03-17
Payer: MEDICARE

## 2022-03-17 VITALS — WEIGHT: 192 LBS | RESPIRATION RATE: 16 BRPM | HEIGHT: 68 IN | BODY MASS INDEX: 29.1 KG/M2

## 2022-03-17 DIAGNOSIS — G89.29 BILATERAL CHRONIC KNEE PAIN: ICD-10-CM

## 2022-03-17 DIAGNOSIS — M17.0 PRIMARY OSTEOARTHRITIS OF BOTH KNEES: Primary | ICD-10-CM

## 2022-03-17 DIAGNOSIS — M25.562 BILATERAL CHRONIC KNEE PAIN: ICD-10-CM

## 2022-03-17 DIAGNOSIS — M25.561 BILATERAL CHRONIC KNEE PAIN: ICD-10-CM

## 2022-03-17 PROCEDURE — 1159F MED LIST DOCD IN RCRD: CPT | Mod: CPTII,S$GLB,, | Performed by: PHYSICAL MEDICINE & REHABILITATION

## 2022-03-17 PROCEDURE — 20611 LARGE JOINT ASPIRATION/INJECTION: R KNEE: ICD-10-PCS | Mod: RT,S$GLB,, | Performed by: PHYSICAL MEDICINE & REHABILITATION

## 2022-03-17 PROCEDURE — 20611 DRAIN/INJ JOINT/BURSA W/US: CPT | Mod: RT,S$GLB,, | Performed by: PHYSICAL MEDICINE & REHABILITATION

## 2022-03-17 PROCEDURE — 3008F PR BODY MASS INDEX (BMI) DOCUMENTED: ICD-10-PCS | Mod: CPTII,S$GLB,, | Performed by: PHYSICAL MEDICINE & REHABILITATION

## 2022-03-17 PROCEDURE — 1159F PR MEDICATION LIST DOCUMENTED IN MEDICAL RECORD: ICD-10-PCS | Mod: CPTII,S$GLB,, | Performed by: PHYSICAL MEDICINE & REHABILITATION

## 2022-03-17 PROCEDURE — 1125F PR PAIN SEVERITY QUANTIFIED, PAIN PRESENT: ICD-10-PCS | Mod: CPTII,S$GLB,, | Performed by: PHYSICAL MEDICINE & REHABILITATION

## 2022-03-17 PROCEDURE — 3008F BODY MASS INDEX DOCD: CPT | Mod: CPTII,S$GLB,, | Performed by: PHYSICAL MEDICINE & REHABILITATION

## 2022-03-17 PROCEDURE — 1160F PR REVIEW ALL MEDS BY PRESCRIBER/CLIN PHARMACIST DOCUMENTED: ICD-10-PCS | Mod: CPTII,S$GLB,, | Performed by: PHYSICAL MEDICINE & REHABILITATION

## 2022-03-17 PROCEDURE — 1160F RVW MEDS BY RX/DR IN RCRD: CPT | Mod: CPTII,S$GLB,, | Performed by: PHYSICAL MEDICINE & REHABILITATION

## 2022-03-17 PROCEDURE — 99999 PR PBB SHADOW E&M-EST. PATIENT-LVL III: ICD-10-PCS | Mod: PBBFAC,,, | Performed by: PHYSICAL MEDICINE & REHABILITATION

## 2022-03-17 PROCEDURE — 3288F FALL RISK ASSESSMENT DOCD: CPT | Mod: CPTII,S$GLB,, | Performed by: PHYSICAL MEDICINE & REHABILITATION

## 2022-03-17 PROCEDURE — 3288F PR FALLS RISK ASSESSMENT DOCUMENTED: ICD-10-PCS | Mod: CPTII,S$GLB,, | Performed by: PHYSICAL MEDICINE & REHABILITATION

## 2022-03-17 PROCEDURE — 99213 OFFICE O/P EST LOW 20 MIN: CPT | Mod: 25,S$GLB,, | Performed by: PHYSICAL MEDICINE & REHABILITATION

## 2022-03-17 PROCEDURE — 1125F AMNT PAIN NOTED PAIN PRSNT: CPT | Mod: CPTII,S$GLB,, | Performed by: PHYSICAL MEDICINE & REHABILITATION

## 2022-03-17 PROCEDURE — 1101F PT FALLS ASSESS-DOCD LE1/YR: CPT | Mod: CPTII,S$GLB,, | Performed by: PHYSICAL MEDICINE & REHABILITATION

## 2022-03-17 PROCEDURE — 1101F PR PT FALLS ASSESS DOC 0-1 FALLS W/OUT INJ PAST YR: ICD-10-PCS | Mod: CPTII,S$GLB,, | Performed by: PHYSICAL MEDICINE & REHABILITATION

## 2022-03-17 PROCEDURE — 99213 PR OFFICE/OUTPT VISIT, EST, LEVL III, 20-29 MIN: ICD-10-PCS | Mod: 25,S$GLB,, | Performed by: PHYSICAL MEDICINE & REHABILITATION

## 2022-03-17 PROCEDURE — 99999 PR PBB SHADOW E&M-EST. PATIENT-LVL III: CPT | Mod: PBBFAC,,, | Performed by: PHYSICAL MEDICINE & REHABILITATION

## 2022-03-17 RX ADMIN — TRIAMCINOLONE ACETONIDE 40 MG: 40 INJECTION, SUSPENSION INTRA-ARTICULAR; INTRAMUSCULAR at 09:03

## 2022-03-18 NOTE — PROGRESS NOTES
Health Maintenance Due   Topic Date Due    Shingles Vaccine (1 of 2) Never done    DEXA Scan  11/07/2021     Updates were requested from care everywhere.  Chart was reviewed for overdue Proactive [facility] Encounters (DOMINGO) topics (CRS, Breast Cancer Screening, Eye exam)  Health Maintenance has been updated.  LINKS immunization registry triggered.  Immunizations were reconciled.

## 2022-03-19 RX ORDER — TRIAMCINOLONE ACETONIDE 40 MG/ML
40 INJECTION, SUSPENSION INTRA-ARTICULAR; INTRAMUSCULAR
Status: DISCONTINUED | OUTPATIENT
Start: 2022-03-17 | End: 2022-03-19 | Stop reason: HOSPADM

## 2022-03-19 NOTE — PROCEDURES
Large Joint Aspiration/Injection: R knee    Date/Time: 3/17/2022 9:30 AM  Performed by: Deon Ragland MD  Authorized by: Deon Ragland MD     Consent Done?:  Yes (Verbal)  Indications:  Arthritis, joint swelling and pain  Site marked: the procedure site was marked    Timeout: prior to procedure the correct patient, procedure, and site was verified    Prep: patient was prepped and draped in usual sterile fashion      Local anesthesia used?: Yes    Anesthesia:  Local infiltration  Local anesthetic:  Lidocaine 1% without epinephrine  Anesthetic total (ml):  3      Details:  Needle Size:  18 G  Ultrasonic Guidance for needle placement?: Yes    Images are saved and documented.  Approach: in plane, lat to med.  Location:  Knee  Site:  R knee  Medications:  40 mg triamcinolone acetonide 40 mg/mL  Aspirate amount (mL):  15  Aspirate:  Serous  Patient tolerance:  Patient tolerated the procedure well with no immediate complications     Ultrasound guidance was used for correct needle placement, the images were saved will be uploaded to EMR.

## 2022-03-19 NOTE — PROGRESS NOTES
[facility]      CHIEF COMPLAINT:   Chief Complaint   Patient presents with    Follow-up     RFA of right knee f/u     HISTORY OF PRESENT ILLNESS: Tali Fitzpatrick is a 71 y.o. female who presents to me in follow-up in regards to her bilateral knee pain.  She is about 2 weeks status post cooled radiofrequency ablation to the right knee.  Unfortunately, she is not experiencing any pain reduction from that procedure.  Her current pain level as a 9 on a scale of 1-10.  She does have persisting pain on left side but the right side is her primary complaint.  She is not currently taking any medicine for pain.  The pain is most prominent over the medial aspect of the right knee.  She has increased knee pain with prolonged standing and walking.    Review of Systems   Constitutional: Negative for fever.   HENT: Negative for drooling.    Eyes: Negative for discharge.   Respiratory: Negative for choking.    Cardiovascular: Negative for chest pain.   Genitourinary: Negative for flank pain.   Skin: Negative for wound.   Allergic/Immunologic: Negative for immunocompromised state.   Neurological: Negative for tremors and syncope.   Psychiatric/Behavioral: Negative for behavioral problems.     Past Medical History:   Past Medical History:   Diagnosis Date    Anxiety and depression     CKD (chronic kidney disease)     DJD (degenerative joint disease)     bilateral knees   Hyalgan series completed 8/27/14    Hyperlipidemia     Hypertension     Insomnia        Past Surgical History:   Past Surgical History:   Procedure Laterality Date    CATARACT EXTRACTION W/  INTRAOCULAR LENS IMPLANT      left    EYE SURGERY      LIPOSUCTION      RADIOFREQUENCY ABLATION Right 3/4/2022    Procedure: Radiofrequency Ablation;  Surgeon: Deon Ragland MD;  Location: [facility];  Service: Orthopedics;  Laterality: Right;  KNEE-Cooled    TONSILLECTOMY         Family History:   Family History   Problem Relation Age of  Onset    Arthritis Mother     Coronary artery disease Mother     Coronary artery disease Father     Colon cancer Neg Hx     Breast cancer Neg Hx     Diabetes Neg Hx        Medications:   Current Outpatient Medications on File Prior to Visit   Medication Sig Dispense Refill    ALPRAZolam (XANAX) 0.5 MG tablet Take 1 tablet (0.5 mg total) by mouth nightly as needed. 30 tablet 5    amLODIPine (NORVASC) 5 MG tablet TAKE 1 TABLET EVERY DAY 90 tablet 3    atenoloL (TENORMIN) 50 MG tablet TAKE 1 TABLET EVERY DAY 90 tablet 3    chlorhexidine (PERIDEX) 0.12 % solution place 15 milliliters in the mouth by mucous membrane route 2 times per day after brushing teeth, swish in mouth for 30 seconds then spit out      citalopram (CELEXA) 40 MG tablet TAKE 1 TABLET EVERY DAY 90 tablet 1    cyanocobalamin (VITAMIN B-12) 1000 MCG tablet Take 1,000 mcg by mouth once daily.      fluticasone propionate (FLONASE) 50 mcg/actuation nasal spray 2 sprays (100 mcg total) by Each Nostril route once daily. 16 g 11    folic acid/multivit-min/lutein (CENTRUM SILVER ORAL) Take 1 tablet by mouth once daily.      hydrOXYzine pamoate (VISTARIL) 50 MG Cap TAKE 1 CAPSULE EVERY 8 (EIGHT) HOURS AS NEEDED. 120 capsule 2    pravastatin (PRAVACHOL) 20 MG tablet TAKE 1 TABLET EVERY DAY 90 tablet 3     No current facility-administered medications on file prior to visit.       Allergies:   Review of patient's allergies indicates:   Allergen Reactions    Micardis [telmisartan]      Can't remember       Social History:   Social History     Socioeconomic History    Marital status:     Number of children: 1   Occupational History    Occupation: Retired   Tobacco Use    Smoking status: Never Smoker    Smokeless tobacco: Never Used   Substance and Sexual Activity    Alcohol use: Yes     Alcohol/week: 0.0 standard drinks     Comment: social    Drug use: Yes     Types: Benzodiazepines     PHYSICAL EXAMINATION:   General    Vitals:     "03/17/22 0930   Resp: 16   Weight: 87.1 kg (192 lb 0.3 oz)   Height: 5' 7.5" (1.715 m)     Constitutional: Oriented to person, place, and time. No apparent distress. Pleasant.  HENT:   Head: Normocephalic and atraumatic.   Eyes: Right eye exhibits no discharge. Left eye exhibits no discharge. No scleral icterus.   Pulmonary/Chest: Effort normal. No respiratory distress.   Abdominal: There is no guarding.   Neurological: Alert and oriented to person, place, and time.   Psychiatric: Behavior is normal.   Right Knee Exam     Tenderness   The patient is experiencing tenderness in the medial joint line, pes anserinus and lateral joint line.    Range of Motion   Right knee extension: -3.   Flexion: 100     Tests   Varus: negative Valgus: negative  Lachman:  Anterior - negative    Posterior - negative  Patellar apprehension: negative    Other   Erythema: absent  Scars: absent  Sensation: normal  Pulse: present  Swelling: mild  Effusion: effusion present      Left Knee Exam     Tenderness   The patient is experiencing tenderness in the medial joint line and pes anserinus.    Range of Motion   Left knee extension: -1.   Flexion: 130     Tests   Mary:  Medial - negative Lateral - negative  Varus: negative Valgus: negative  Lachman:  Anterior - negative    Posterior - negative  Patellar apprehension: negative    Other   Erythema: absent  Scars: absent  Sensation: normal  Pulse: present  Effusion: no effusion present        INSPECTION: There is no ecchymoses, erythema or gross deformity of the right knee.    Imaging  Bilateral Knee XRays 2017  AP and PA flexion standing views of both knees as well as a lateral and Merchant views of the left and a Merchant view of the right knee were obtained. There is lateral patellar tilt bilaterally. There is left greater than right medial joint space narrowing. Tricompartment osteophyte formation is noted. I do not see evidence of a significant left-sided joint effusion.  IMPRESSION:    " Left greater than right tricompartmental knee osteoarthritis with mild progression relative to June 22, 2017    Data Reviewed: X-ray    Supportive Actions: Independent visualization of images or test specimens    ASSESSMENT:   Encounter Diagnoses   Name Primary?    Primary osteoarthritis of both knees Yes    Bilateral chronic knee pain      PLAN:     1. Unfortunately she has not had any relief from the recent right knee genicular radiofrequency ablation.  We discussed this could be secondary to more advanced knee osteoarthritis with intraosseous edema and/or incomplete nerve ablation.  We discussed that our options include referral for knee arthroplasty, repeat injection of corticosteroid, or repeat radiofrequency ablation.  After discussion we elected to proceed with injection of corticosteroid after aspiration of the effusion under ultrasound guidance.  See separate procedure note.  We were able to aspirate 15 cc of serous fluid from the knee prior to injection of corticosteroid.    2. She plans to assess her response to today's injection and may consider returning in the upcoming weeks for left knee genicular radiofrequency ablation.  I encouraged her to reach out if any questions or further issues arise.    The above note was completed, in part, with the aid of Dragon dictation software/hardware. Translation errors may be present.

## 2022-04-04 DIAGNOSIS — K05.219 GINGIVAL ABSCESS: Primary | ICD-10-CM

## 2022-04-04 RX ORDER — PENICILLIN V POTASSIUM 500 MG/1
500 TABLET, FILM COATED ORAL 2 TIMES DAILY
Qty: 28 TABLET | Refills: 0 | Status: SHIPPED | OUTPATIENT
Start: 2022-04-04 | End: 2022-04-07

## 2022-04-04 RX ORDER — IBUPROFEN 800 MG/1
800 TABLET ORAL 3 TIMES DAILY
Qty: 30 TABLET | Refills: 0 | Status: SHIPPED | OUTPATIENT
Start: 2022-04-04 | End: 2022-04-11

## 2022-04-07 DIAGNOSIS — K05.219 GINGIVAL ABSCESS: Primary | ICD-10-CM

## 2022-04-07 RX ORDER — AMOXICILLIN 875 MG/1
875 TABLET, FILM COATED ORAL 2 TIMES DAILY
Qty: 20 TABLET | Refills: 0 | Status: SHIPPED | OUTPATIENT
Start: 2022-04-07 | End: 2022-04-11 | Stop reason: ALTCHOICE

## 2022-04-11 ENCOUNTER — TELEPHONE (OUTPATIENT)
Dept: INTERNAL MEDICINE | Facility: CLINIC | Age: 72
End: 2022-04-11
Payer: MEDICARE

## 2022-04-11 ENCOUNTER — OFFICE VISIT (OUTPATIENT)
Dept: INTERNAL MEDICINE | Facility: CLINIC | Age: 72
End: 2022-04-11
Payer: MEDICARE

## 2022-04-11 VITALS
DIASTOLIC BLOOD PRESSURE: 70 MMHG | SYSTOLIC BLOOD PRESSURE: 124 MMHG | WEIGHT: 194 LBS | HEIGHT: 68 IN | TEMPERATURE: 97 F | HEART RATE: 66 BPM | BODY MASS INDEX: 29.4 KG/M2

## 2022-04-11 DIAGNOSIS — I10 HYPERTENSION, UNSPECIFIED TYPE: Primary | ICD-10-CM

## 2022-04-11 DIAGNOSIS — R73.09 ABNORMAL GLUCOSE: ICD-10-CM

## 2022-04-11 DIAGNOSIS — R71.8 MICROCYTOSIS: ICD-10-CM

## 2022-04-11 DIAGNOSIS — E78.5 DYSLIPIDEMIA: ICD-10-CM

## 2022-04-11 DIAGNOSIS — N18.31 STAGE 3A CHRONIC KIDNEY DISEASE: ICD-10-CM

## 2022-04-11 DIAGNOSIS — K92.1 BLOOD IN STOOL: ICD-10-CM

## 2022-04-11 PROCEDURE — 3288F PR FALLS RISK ASSESSMENT DOCUMENTED: ICD-10-PCS | Mod: CPTII,S$GLB,, | Performed by: FAMILY MEDICINE

## 2022-04-11 PROCEDURE — 99499 RISK ADDL DX/OHS AUDIT: ICD-10-PCS | Mod: S$GLB,,, | Performed by: FAMILY MEDICINE

## 2022-04-11 PROCEDURE — 99214 OFFICE O/P EST MOD 30 MIN: CPT | Mod: S$GLB,,, | Performed by: FAMILY MEDICINE

## 2022-04-11 PROCEDURE — 3008F PR BODY MASS INDEX (BMI) DOCUMENTED: ICD-10-PCS | Mod: CPTII,S$GLB,, | Performed by: FAMILY MEDICINE

## 2022-04-11 PROCEDURE — 3008F BODY MASS INDEX DOCD: CPT | Mod: CPTII,S$GLB,, | Performed by: FAMILY MEDICINE

## 2022-04-11 PROCEDURE — 1160F RVW MEDS BY RX/DR IN RCRD: CPT | Mod: CPTII,S$GLB,, | Performed by: FAMILY MEDICINE

## 2022-04-11 PROCEDURE — 1101F PT FALLS ASSESS-DOCD LE1/YR: CPT | Mod: CPTII,S$GLB,, | Performed by: FAMILY MEDICINE

## 2022-04-11 PROCEDURE — 99999 PR PBB SHADOW E&M-EST. PATIENT-LVL IV: ICD-10-PCS | Mod: PBBFAC,,, | Performed by: FAMILY MEDICINE

## 2022-04-11 PROCEDURE — 3074F SYST BP LT 130 MM HG: CPT | Mod: CPTII,S$GLB,, | Performed by: FAMILY MEDICINE

## 2022-04-11 PROCEDURE — 1159F PR MEDICATION LIST DOCUMENTED IN MEDICAL RECORD: ICD-10-PCS | Mod: CPTII,S$GLB,, | Performed by: FAMILY MEDICINE

## 2022-04-11 PROCEDURE — 99214 PR OFFICE/OUTPT VISIT, EST, LEVL IV, 30-39 MIN: ICD-10-PCS | Mod: S$GLB,,, | Performed by: FAMILY MEDICINE

## 2022-04-11 PROCEDURE — 1125F PR PAIN SEVERITY QUANTIFIED, PAIN PRESENT: ICD-10-PCS | Mod: CPTII,S$GLB,, | Performed by: FAMILY MEDICINE

## 2022-04-11 PROCEDURE — 3074F PR MOST RECENT SYSTOLIC BLOOD PRESSURE < 130 MM HG: ICD-10-PCS | Mod: CPTII,S$GLB,, | Performed by: FAMILY MEDICINE

## 2022-04-11 PROCEDURE — 99999 PR PBB SHADOW E&M-EST. PATIENT-LVL IV: CPT | Mod: PBBFAC,,, | Performed by: FAMILY MEDICINE

## 2022-04-11 PROCEDURE — 3078F DIAST BP <80 MM HG: CPT | Mod: CPTII,S$GLB,, | Performed by: FAMILY MEDICINE

## 2022-04-11 PROCEDURE — 1159F MED LIST DOCD IN RCRD: CPT | Mod: CPTII,S$GLB,, | Performed by: FAMILY MEDICINE

## 2022-04-11 PROCEDURE — 1160F PR REVIEW ALL MEDS BY PRESCRIBER/CLIN PHARMACIST DOCUMENTED: ICD-10-PCS | Mod: CPTII,S$GLB,, | Performed by: FAMILY MEDICINE

## 2022-04-11 PROCEDURE — 1125F AMNT PAIN NOTED PAIN PRSNT: CPT | Mod: CPTII,S$GLB,, | Performed by: FAMILY MEDICINE

## 2022-04-11 PROCEDURE — 99499 UNLISTED E&M SERVICE: CPT | Mod: S$GLB,,, | Performed by: FAMILY MEDICINE

## 2022-04-11 PROCEDURE — 1101F PR PT FALLS ASSESS DOC 0-1 FALLS W/OUT INJ PAST YR: ICD-10-PCS | Mod: CPTII,S$GLB,, | Performed by: FAMILY MEDICINE

## 2022-04-11 PROCEDURE — 3078F PR MOST RECENT DIASTOLIC BLOOD PRESSURE < 80 MM HG: ICD-10-PCS | Mod: CPTII,S$GLB,, | Performed by: FAMILY MEDICINE

## 2022-04-11 PROCEDURE — 3288F FALL RISK ASSESSMENT DOCD: CPT | Mod: CPTII,S$GLB,, | Performed by: FAMILY MEDICINE

## 2022-04-11 RX ORDER — PANTOPRAZOLE SODIUM 40 MG/1
40 TABLET, DELAYED RELEASE ORAL DAILY
Qty: 30 TABLET | Refills: 1 | Status: SHIPPED | OUTPATIENT
Start: 2022-04-11 | End: 2022-05-25

## 2022-04-11 NOTE — PROGRESS NOTES
"Subjective:      Patient ID: Tali Fitzpatrick is a 71 y.o. female.    Chief Complaint: Follow-up and Melena    HPI 71 y.o.   female patient with a PMHx of anxiety, depression, CKD, DJD, HLD, HTN and insomnia presents to clinic for follow up. The patient was last seen on September 20, 2021      Today she reports that she is feeling okay. Patient reports that she has noticed black stools onset 1 weeks ago.  Hx of ulcers, feels she may have them again.  Some epigastric pain//avoiding irritating foods.  Not taking NSAIDs//even though UC gave them.  She has CKD and should NOT use NSAIDs.  She is aware of this and didn't take them.    She also reports that she has been under increased stress lately.     Patient otherwise without complaints.     Past Medical History:   Diagnosis Date    Anxiety and depression     CKD (chronic kidney disease)     DJD (degenerative joint disease)     bilateral knees   Hyalgan series completed 8/27/14    Hyperlipidemia     Hypertension     Insomnia      Family History   Problem Relation Age of Onset    Arthritis Mother     Coronary artery disease Mother     Coronary artery disease Father     Colon cancer Neg Hx     Breast cancer Neg Hx     Diabetes Neg Hx      Past Surgical History:   Procedure Laterality Date    CATARACT EXTRACTION W/  INTRAOCULAR LENS IMPLANT      left    EYE SURGERY      LIPOSUCTION      RADIOFREQUENCY ABLATION Right 3/4/2022    Procedure: Radiofrequency Ablation;  Surgeon: Deon Ragland MD;  Location: [facility] OR;  Service: Orthopedics;  Laterality: Right;  KNEE-Cooled    TONSILLECTOMY       Social History     Tobacco Use    Smoking status: Never Smoker    Smokeless tobacco: Never Used   Substance Use Topics    Alcohol use: Yes     Alcohol/week: 0.0 standard drinks     Comment: social    Drug use: Yes     Types: Benzodiazepines       /70   Pulse 66   Temp 97 °F (36.1 °C) (Temporal)   Ht 5' 7.5" (1.715 m)   Wt 88 kg (194 lb 0.1 oz)   BMI " 29.94 kg/m²     Review of Systems   Constitutional: Negative for activity change, appetite change, chills, diaphoresis, fatigue, fever and unexpected weight change.   HENT: Negative for hearing loss, rhinorrhea, tinnitus and trouble swallowing.    Eyes: Negative for discharge and visual disturbance.   Respiratory: Negative for cough, chest tightness, shortness of breath and wheezing.    Cardiovascular: Negative for chest pain, palpitations and leg swelling.   Gastrointestinal: Negative for abdominal distention, abdominal pain, constipation, diarrhea and vomiting.        Black stool   Endocrine: Negative for polydipsia and polyuria.   Genitourinary: Negative for difficulty urinating, dysuria, frequency, hematuria, menstrual problem and urgency.   Musculoskeletal: Negative for arthralgias, back pain, joint swelling and neck pain.   Skin: Negative for color change and rash.   Neurological: Negative for weakness and headaches.   Hematological: Negative for adenopathy.   Psychiatric/Behavioral: Negative for confusion, decreased concentration and dysphoric mood.       Objective:     Physical Exam  Vitals and nursing note reviewed.   Constitutional:       General: She is not in acute distress.  HENT:      Right Ear: External ear normal.      Left Ear: External ear normal.      Nose: Nose normal.   Eyes:      Conjunctiva/sclera: Conjunctivae normal.      Pupils: Pupils are equal, round, and reactive to light.   Neck:      Vascular: No carotid bruit.   Cardiovascular:      Rate and Rhythm: Normal rate and regular rhythm.      Heart sounds: Normal heart sounds.   Pulmonary:      Effort: Pulmonary effort is normal. No respiratory distress.      Breath sounds: Normal breath sounds. No wheezing or rales.   Abdominal:      General: Bowel sounds are normal. There is no distension.      Palpations: Abdomen is soft.      Tenderness: There is no abdominal tenderness. There is no guarding.   Musculoskeletal:      Cervical back: Normal  range of motion and neck supple.      Right lower leg: No edema.      Left lower leg: No edema.   Skin:     General: Skin is warm and dry.      Findings: No rash.   Neurological:      Mental Status: She is alert and oriented to person, place, and time.   Psychiatric:         Behavior: Behavior normal.         Thought Content: Thought content normal.         Judgment: Judgment normal.         Lab Results   Component Value Date    WBC 4.90 03/11/2022    HGB 12.0 03/11/2022    HCT 40.2 03/11/2022     03/11/2022    CHOL 191 09/21/2021    TRIG 73 09/21/2021    HDL 68 09/21/2021    ALT 12 09/21/2021    AST 16 09/21/2021     03/11/2022    K 4.1 03/11/2022     (H) 03/11/2022    CREATININE 1.2 03/11/2022    BUN 20 03/11/2022    CO2 22 (L) 03/11/2022    TSH 2.610 09/21/2021    HGBA1C 5.8 (H) 09/21/2021       Assessment:     1. Hypertension, unspecified type    2. Stage 3a chronic kidney disease    3. Microcytosis    4. Blood in stool    5. Abnormal glucose    6. Dyslipidemia         Plan:     Hypertension, unspecified type  -     CBC Auto Differential; Future; Expected date: 10/11/2022    Stage 3a chronic kidney disease  -     Comprehensive Metabolic Panel; Future; Expected date: 10/11/2022    Microcytosis    Blood in stool  -     Ambulatory referral/consult to Gastroenterology; Future; Expected date: 04/18/2022  -     Occult blood x 1, stool; Future; Expected date: 04/11/2022    Abnormal glucose  -     Hemoglobin A1C; Future; Expected date: 10/11/2022    Dyslipidemia  -     Lipid Panel; Future; Expected date: 10/11/2022  -     TSH; Future; Expected date: 10/11/2022    Other orders  -     pantoprazole (PROTONIX) 40 MG tablet; Take 1 tablet (40 mg total) by mouth once daily.  Dispense: 30 tablet; Refill: 1        Vitals reviewed and stable. BP within normal range at 124/70    Patient will begin on Protonix daily. She will also need to see GI.     Patient with chronic kidney disease. Patient is to increase  water intake and avoid anti-inflammatory drug use for pain.   bP stable/cont meds  Cont Xanax PRN  F/u 6 mos        Questions and concerns addressed.          Documentation entered by Sarah Wynn, acting as scribe for Dr. Yobani Ortega. 04/11/2022 10:48 AM.

## 2022-04-12 ENCOUNTER — LAB VISIT (OUTPATIENT)
Dept: LAB | Facility: HOSPITAL | Age: 72
End: 2022-04-12
Attending: FAMILY MEDICINE
Payer: MEDICARE

## 2022-04-12 DIAGNOSIS — K92.1 BLOOD IN STOOL: ICD-10-CM

## 2022-04-12 PROCEDURE — 82272 OCCULT BLD FECES 1-3 TESTS: CPT | Performed by: FAMILY MEDICINE

## 2022-04-13 LAB — OB PNL STL: NEGATIVE

## 2022-05-18 ENCOUNTER — PATIENT MESSAGE (OUTPATIENT)
Dept: INTERNAL MEDICINE | Facility: CLINIC | Age: 72
End: 2022-05-18
Payer: MEDICARE

## 2022-05-25 ENCOUNTER — OFFICE VISIT (OUTPATIENT)
Dept: GASTROENTEROLOGY | Facility: CLINIC | Age: 72
End: 2022-05-25
Payer: MEDICARE

## 2022-05-25 VITALS
WEIGHT: 198.44 LBS | HEIGHT: 68 IN | BODY MASS INDEX: 30.07 KG/M2 | DIASTOLIC BLOOD PRESSURE: 80 MMHG | SYSTOLIC BLOOD PRESSURE: 150 MMHG | HEART RATE: 74 BPM

## 2022-05-25 DIAGNOSIS — K92.1 MELENA: ICD-10-CM

## 2022-05-25 DIAGNOSIS — R10.13 EPIGASTRIC PAIN: Primary | ICD-10-CM

## 2022-05-25 PROCEDURE — 3079F PR MOST RECENT DIASTOLIC BLOOD PRESSURE 80-89 MM HG: ICD-10-PCS | Mod: CPTII,S$GLB,, | Performed by: NURSE PRACTITIONER

## 2022-05-25 PROCEDURE — 3008F BODY MASS INDEX DOCD: CPT | Mod: CPTII,S$GLB,, | Performed by: NURSE PRACTITIONER

## 2022-05-25 PROCEDURE — 1126F AMNT PAIN NOTED NONE PRSNT: CPT | Mod: CPTII,S$GLB,, | Performed by: NURSE PRACTITIONER

## 2022-05-25 PROCEDURE — 1101F PR PT FALLS ASSESS DOC 0-1 FALLS W/OUT INJ PAST YR: ICD-10-PCS | Mod: CPTII,S$GLB,, | Performed by: NURSE PRACTITIONER

## 2022-05-25 PROCEDURE — 1101F PT FALLS ASSESS-DOCD LE1/YR: CPT | Mod: CPTII,S$GLB,, | Performed by: NURSE PRACTITIONER

## 2022-05-25 PROCEDURE — 3288F PR FALLS RISK ASSESSMENT DOCUMENTED: ICD-10-PCS | Mod: CPTII,S$GLB,, | Performed by: NURSE PRACTITIONER

## 2022-05-25 PROCEDURE — 1159F MED LIST DOCD IN RCRD: CPT | Mod: CPTII,S$GLB,, | Performed by: NURSE PRACTITIONER

## 2022-05-25 PROCEDURE — 99204 OFFICE O/P NEW MOD 45 MIN: CPT | Mod: S$GLB,,, | Performed by: NURSE PRACTITIONER

## 2022-05-25 PROCEDURE — 3288F FALL RISK ASSESSMENT DOCD: CPT | Mod: CPTII,S$GLB,, | Performed by: NURSE PRACTITIONER

## 2022-05-25 PROCEDURE — 99999 PR PBB SHADOW E&M-EST. PATIENT-LVL IV: CPT | Mod: PBBFAC,,, | Performed by: NURSE PRACTITIONER

## 2022-05-25 PROCEDURE — 3008F PR BODY MASS INDEX (BMI) DOCUMENTED: ICD-10-PCS | Mod: CPTII,S$GLB,, | Performed by: NURSE PRACTITIONER

## 2022-05-25 PROCEDURE — 99999 PR PBB SHADOW E&M-EST. PATIENT-LVL IV: ICD-10-PCS | Mod: PBBFAC,,, | Performed by: NURSE PRACTITIONER

## 2022-05-25 PROCEDURE — 1160F RVW MEDS BY RX/DR IN RCRD: CPT | Mod: CPTII,S$GLB,, | Performed by: NURSE PRACTITIONER

## 2022-05-25 PROCEDURE — 1126F PR PAIN SEVERITY QUANTIFIED, NO PAIN PRESENT: ICD-10-PCS | Mod: CPTII,S$GLB,, | Performed by: NURSE PRACTITIONER

## 2022-05-25 PROCEDURE — 3077F SYST BP >= 140 MM HG: CPT | Mod: CPTII,S$GLB,, | Performed by: NURSE PRACTITIONER

## 2022-05-25 PROCEDURE — 3079F DIAST BP 80-89 MM HG: CPT | Mod: CPTII,S$GLB,, | Performed by: NURSE PRACTITIONER

## 2022-05-25 PROCEDURE — 99204 PR OFFICE/OUTPT VISIT, NEW, LEVL IV, 45-59 MIN: ICD-10-PCS | Mod: S$GLB,,, | Performed by: NURSE PRACTITIONER

## 2022-05-25 PROCEDURE — 3077F PR MOST RECENT SYSTOLIC BLOOD PRESSURE >= 140 MM HG: ICD-10-PCS | Mod: CPTII,S$GLB,, | Performed by: NURSE PRACTITIONER

## 2022-05-25 PROCEDURE — 1160F PR REVIEW ALL MEDS BY PRESCRIBER/CLIN PHARMACIST DOCUMENTED: ICD-10-PCS | Mod: CPTII,S$GLB,, | Performed by: NURSE PRACTITIONER

## 2022-05-25 PROCEDURE — 1159F PR MEDICATION LIST DOCUMENTED IN MEDICAL RECORD: ICD-10-PCS | Mod: CPTII,S$GLB,, | Performed by: NURSE PRACTITIONER

## 2022-05-25 RX ORDER — PANTOPRAZOLE SODIUM 40 MG/1
40 TABLET, DELAYED RELEASE ORAL DAILY
Qty: 30 TABLET | Refills: 1 | Status: SHIPPED | OUTPATIENT
Start: 2022-05-25 | End: 2023-10-19

## 2022-05-25 NOTE — PROGRESS NOTES
Clinic Consult:  [facility] Consultation Note    Reason for Consult:  The primary encounter diagnosis was Epigastric pain. A diagnosis of Melena was also pertinent to this visit.    PCP: Yobani Muniz   [id number] / [place] [id number]    HPI:  This is a 72 y.o. female here for evaluation of epigastric pain.   Onset of symptoms started 6 weeks ago. She was having epigastric pain and black stools. She had workup that revealed normal Hgb and SFOB was negative for blood. She was given pantoprazole 40 mg and has been taking it with resolution of symptoms. She is no longer having any abdominal pain or black stools.    Review of Systems   Constitutional: Negative for fever, malaise/fatigue and weight loss.   HENT: Negative for sore throat.    Respiratory: Negative for cough and wheezing.    Cardiovascular: Negative for chest pain and palpitations.   Gastrointestinal: Positive for abdominal pain and blood in stool (melena ). Negative for constipation, diarrhea, heartburn, melena, nausea and vomiting.   Genitourinary: Negative for dysuria and frequency.   Musculoskeletal: Negative for back pain, joint pain, myalgias and neck pain.   Skin: Negative for itching and rash.   Neurological: Negative for dizziness, speech change, seizures, loss of consciousness and headaches.   Psychiatric/Behavioral: Negative for depression and substance abuse. The patient is not nervous/anxious.        Medical History:  has a past medical history of Anxiety and depression, CKD (chronic kidney disease), DJD (degenerative joint disease), Hyperlipidemia, Hypertension, and Insomnia.    Surgical History:  has a past surgical history that includes Liposuction; Cataract extraction w/  intraocular lens implant; Tonsillectomy; Eye surgery; and Radiofrequency ablation (Right, 3/4/2022).    Family History: family history includes Arthritis in her mother; Coronary artery disease in her father and mother..     Social History:   "reports that she has never smoked. She has never used smokeless tobacco. She reports current alcohol use. She reports current drug use. Drug: Benzodiazepines.    Allergies: Reviewed    Home Medications:   Current Outpatient Medications on File Prior to Visit   Medication Sig Dispense Refill    ALPRAZolam (XANAX) 0.5 MG tablet Take 1 tablet (0.5 mg total) by mouth nightly as needed. 30 tablet 5    amLODIPine (NORVASC) 5 MG tablet TAKE 1 TABLET EVERY DAY 90 tablet 3    atenoloL (TENORMIN) 50 MG tablet TAKE 1 TABLET EVERY DAY 90 tablet 3    chlorhexidine (PERIDEX) 0.12 % solution place 15 milliliters in the mouth by mucous membrane route 2 times per day after brushing teeth, swish in mouth for 30 seconds then spit out      citalopram (CELEXA) 40 MG tablet TAKE 1 TABLET EVERY DAY 90 tablet 1    cyanocobalamin (VITAMIN B-12) 1000 MCG tablet Take 1,000 mcg by mouth once daily.      fluticasone propionate (FLONASE) 50 mcg/actuation nasal spray 2 sprays (100 mcg total) by Each Nostril route once daily. 16 g 11    folic acid/multivit-min/lutein (CENTRUM SILVER ORAL) Take 1 tablet by mouth once daily.      hydrOXYzine pamoate (VISTARIL) 50 MG Cap TAKE 1 CAPSULE EVERY 8 (EIGHT) HOURS AS NEEDED. 120 capsule 2    pravastatin (PRAVACHOL) 20 MG tablet TAKE 1 TABLET EVERY DAY 90 tablet 3    [DISCONTINUED] pantoprazole (PROTONIX) 40 MG tablet Take 1 tablet (40 mg total) by mouth once daily. 30 tablet 1     No current facility-administered medications on file prior to visit.       Physical Exam:  BP (!) 150/80 (BP Location: Left arm, Patient Position: Sitting, BP Method: Medium (Manual))   Pulse 74   Ht 5' 7.5" (1.715 m)   Wt 90 kg (198 lb 6.6 oz)   BMI 30.62 kg/m²   Body mass index is 30.62 kg/m².  Physical Exam  Constitutional:       General: She is not in acute distress.  HENT:      Head: Normocephalic and atraumatic.   Eyes:      General: No scleral icterus.     Conjunctiva/sclera: Conjunctivae normal. "   Cardiovascular:      Rate and Rhythm: Normal rate and regular rhythm.      Heart sounds: No murmur heard.  Pulmonary:      Effort: Pulmonary effort is normal. No respiratory distress.      Breath sounds: Normal breath sounds. No wheezing.   Abdominal:      General: Abdomen is flat. Bowel sounds are normal.      Palpations: Abdomen is soft.      Tenderness: There is no abdominal tenderness.   Skin:     General: Skin is warm and dry.   Neurological:      General: No focal deficit present.      Mental Status: She is alert and oriented to person, place, and time.      Cranial Nerves: No cranial nerve deficit.   Psychiatric:         Mood and Affect: Mood normal.         Judgment: Judgment normal.         Labs: Pertinent labs reviewed.  CRC Screening: up to date    Assessment:  1. Epigastric pain    2. Melena        Recommendations:   - continue pantoprazole daily for 8-12 weeks and then discontinue  - she needs refill.   - notify me if symptoms re-occur and will plan for EGD for further evaluation   - was going to recommend EGD but she had very great response with resolution of symptoms, that I do not feel it necessary at this time.     Epigastric pain    Melena  -     Ambulatory referral/consult to Gastroenterology    Other orders  -     pantoprazole (PROTONIX) 40 MG tablet; Take 1 tablet (40 mg total) by mouth once daily.  Dispense: 30 tablet; Refill: 1        Follow up if symptoms worsen or fail to improve.    Thank you so much for allowing me to participate in the care of SCARLETT Purdy

## 2022-06-27 ENCOUNTER — OFFICE VISIT (OUTPATIENT)
Dept: INTERNAL MEDICINE | Facility: CLINIC | Age: 72
End: 2022-06-27
Payer: MEDICARE

## 2022-06-27 VITALS
DIASTOLIC BLOOD PRESSURE: 80 MMHG | HEART RATE: 86 BPM | SYSTOLIC BLOOD PRESSURE: 136 MMHG | HEIGHT: 67 IN | BODY MASS INDEX: 30.42 KG/M2 | TEMPERATURE: 98 F | WEIGHT: 193.81 LBS

## 2022-06-27 DIAGNOSIS — Z01.818 PRE-OP EXAM: Primary | ICD-10-CM

## 2022-06-27 DIAGNOSIS — M17.0 BILATERAL PRIMARY OSTEOARTHRITIS OF KNEE: ICD-10-CM

## 2022-06-27 DIAGNOSIS — Z78.0 POSTMENOPAUSAL: ICD-10-CM

## 2022-06-27 PROCEDURE — 99999 PR PBB SHADOW E&M-EST. PATIENT-LVL IV: ICD-10-PCS | Mod: PBBFAC,,, | Performed by: NURSE PRACTITIONER

## 2022-06-27 PROCEDURE — 3008F PR BODY MASS INDEX (BMI) DOCUMENTED: ICD-10-PCS | Mod: CPTII,S$GLB,, | Performed by: NURSE PRACTITIONER

## 2022-06-27 PROCEDURE — 1125F AMNT PAIN NOTED PAIN PRSNT: CPT | Mod: CPTII,S$GLB,, | Performed by: NURSE PRACTITIONER

## 2022-06-27 PROCEDURE — 3075F PR MOST RECENT SYSTOLIC BLOOD PRESS GE 130-139MM HG: ICD-10-PCS | Mod: CPTII,S$GLB,, | Performed by: NURSE PRACTITIONER

## 2022-06-27 PROCEDURE — 3075F SYST BP GE 130 - 139MM HG: CPT | Mod: CPTII,S$GLB,, | Performed by: NURSE PRACTITIONER

## 2022-06-27 PROCEDURE — 1101F PR PT FALLS ASSESS DOC 0-1 FALLS W/OUT INJ PAST YR: ICD-10-PCS | Mod: CPTII,S$GLB,, | Performed by: NURSE PRACTITIONER

## 2022-06-27 PROCEDURE — 99214 PR OFFICE/OUTPT VISIT, EST, LEVL IV, 30-39 MIN: ICD-10-PCS | Mod: S$GLB,,, | Performed by: NURSE PRACTITIONER

## 2022-06-27 PROCEDURE — 3079F PR MOST RECENT DIASTOLIC BLOOD PRESSURE 80-89 MM HG: ICD-10-PCS | Mod: CPTII,S$GLB,, | Performed by: NURSE PRACTITIONER

## 2022-06-27 PROCEDURE — 3008F BODY MASS INDEX DOCD: CPT | Mod: CPTII,S$GLB,, | Performed by: NURSE PRACTITIONER

## 2022-06-27 PROCEDURE — 1159F MED LIST DOCD IN RCRD: CPT | Mod: CPTII,S$GLB,, | Performed by: NURSE PRACTITIONER

## 2022-06-27 PROCEDURE — 1101F PT FALLS ASSESS-DOCD LE1/YR: CPT | Mod: CPTII,S$GLB,, | Performed by: NURSE PRACTITIONER

## 2022-06-27 PROCEDURE — 3288F FALL RISK ASSESSMENT DOCD: CPT | Mod: CPTII,S$GLB,, | Performed by: NURSE PRACTITIONER

## 2022-06-27 PROCEDURE — 3288F PR FALLS RISK ASSESSMENT DOCUMENTED: ICD-10-PCS | Mod: CPTII,S$GLB,, | Performed by: NURSE PRACTITIONER

## 2022-06-27 PROCEDURE — 1160F RVW MEDS BY RX/DR IN RCRD: CPT | Mod: CPTII,S$GLB,, | Performed by: NURSE PRACTITIONER

## 2022-06-27 PROCEDURE — 1160F PR REVIEW ALL MEDS BY PRESCRIBER/CLIN PHARMACIST DOCUMENTED: ICD-10-PCS | Mod: CPTII,S$GLB,, | Performed by: NURSE PRACTITIONER

## 2022-06-27 PROCEDURE — 1159F PR MEDICATION LIST DOCUMENTED IN MEDICAL RECORD: ICD-10-PCS | Mod: CPTII,S$GLB,, | Performed by: NURSE PRACTITIONER

## 2022-06-27 PROCEDURE — 3079F DIAST BP 80-89 MM HG: CPT | Mod: CPTII,S$GLB,, | Performed by: NURSE PRACTITIONER

## 2022-06-27 PROCEDURE — 1125F PR PAIN SEVERITY QUANTIFIED, PAIN PRESENT: ICD-10-PCS | Mod: CPTII,S$GLB,, | Performed by: NURSE PRACTITIONER

## 2022-06-27 PROCEDURE — 99214 OFFICE O/P EST MOD 30 MIN: CPT | Mod: S$GLB,,, | Performed by: NURSE PRACTITIONER

## 2022-06-27 PROCEDURE — 99999 PR PBB SHADOW E&M-EST. PATIENT-LVL IV: CPT | Mod: PBBFAC,,, | Performed by: NURSE PRACTITIONER

## 2022-06-27 RX ORDER — HYDROCODONE BITARTRATE AND ACETAMINOPHEN 7.5; 325 MG/1; MG/1
1 TABLET ORAL EVERY 6 HOURS PRN
COMMUNITY
End: 2023-10-19 | Stop reason: ALTCHOICE

## 2022-06-27 NOTE — PROGRESS NOTES
Subjective:     Tali Fitzpatrick is a 72 y.o. female who presents to the office today for a preoperative consultation at the request of surgeon Dr. Weathers who plans on performing right total knee replacement on July 11. This consultation is requested for the specific conditions prompting preoperative evaluation (i.e. because of potential affect on operative risk). Planned anesthesia: general. The patient has the following known anesthesia issues: none. Patients bleeding risk: no recent abnormal bleeding. Patient does not have objections to receiving blood products if needed.    The following portions of the patient's history were reviewed and updated as appropriate:  Past Medical History:   Diagnosis Date    Anxiety and depression     CKD (chronic kidney disease)     DJD (degenerative joint disease)     bilateral knees   Hyalgan series completed 8/27/14    Hyperlipidemia     Hypertension     Insomnia      Past Medical History:   Diagnosis Date    Anxiety and depression     CKD (chronic kidney disease)     DJD (degenerative joint disease)     bilateral knees   Hyalgan series completed 8/27/14    Hyperlipidemia     Hypertension     Insomnia      Family History   Problem Relation Age of Onset    Arthritis Mother     Coronary artery disease Mother     Coronary artery disease Father     Colon cancer Neg Hx     Breast cancer Neg Hx     Diabetes Neg Hx      Past Surgical History:   Procedure Laterality Date    CATARACT EXTRACTION W/  INTRAOCULAR LENS IMPLANT      left    EYE SURGERY      LIPOSUCTION      RADIOFREQUENCY ABLATION Right 3/4/2022    Procedure: Radiofrequency Ablation;  Surgeon: Deon Ragland MD;  Location: [facility];  Service: Orthopedics;  Laterality: Right;  KNEE-Cooled    TONSILLECTOMY       Social History     Socioeconomic History    Marital status:     Number of children: 1   Occupational History    Occupation: Retired   Tobacco Use    Smoking status: Never Smoker     Smokeless tobacco: Never Used   Substance and Sexual Activity    Alcohol use: Yes     Alcohol/week: 0.0 standard drinks     Comment: social    Drug use: Yes     Types: Benzodiazepines     Review of patient's allergies indicates:   Allergen Reactions    Micardis [telmisartan]      Can't remember     Medication List with Changes/Refills   Current Medications    ALPRAZOLAM (XANAX) 0.5 MG TABLET    Take 1 tablet (0.5 mg total) by mouth nightly as needed.    AMLODIPINE (NORVASC) 5 MG TABLET    TAKE 1 TABLET EVERY DAY    ATENOLOL (TENORMIN) 50 MG TABLET    TAKE 1 TABLET EVERY DAY    CHLORHEXIDINE (PERIDEX) 0.12 % SOLUTION    place 15 milliliters in the mouth by mucous membrane route 2 times per day after brushing teeth, swish in mouth for 30 seconds then spit out    CITALOPRAM (CELEXA) 40 MG TABLET    TAKE 1 TABLET EVERY DAY    CYANOCOBALAMIN (VITAMIN B-12) 1000 MCG TABLET    Take 1,000 mcg by mouth once daily.    FLUTICASONE PROPIONATE (FLONASE) 50 MCG/ACTUATION NASAL SPRAY    2 sprays (100 mcg total) by Each Nostril route once daily.    FOLIC ACID/MULTIVIT-MIN/LUTEIN (CENTRUM SILVER ORAL)    Take 1 tablet by mouth once daily.    HYDROCODONE-ACETAMINOPHEN (NORCO) 7.5-325 MG PER TABLET    Take 1 tablet by mouth every 6 (six) hours as needed for Pain.    HYDROXYZINE PAMOATE (VISTARIL) 50 MG CAP    TAKE 1 CAPSULE EVERY 8 (EIGHT) HOURS AS NEEDED.    PANTOPRAZOLE (PROTONIX) 40 MG TABLET    Take 1 tablet (40 mg total) by mouth once daily.    PRAVASTATIN (PRAVACHOL) 20 MG TABLET    TAKE 1 TABLET EVERY DAY     Patient Active Problem List   Diagnosis    Bilateral primary osteoarthritis of knee    Bilateral knee pain    Stage 3b chronic kidney disease    Hypertension    Anxiety and depression    Hyperlipidemia    Vitamin D deficiency       Review of Systems  Review of Systems   Constitutional: Positive for activity change. Negative for appetite change, chills, diaphoresis, fatigue and fever.   HENT: Negative.     Eyes: Negative for visual disturbance.   Respiratory: Negative for cough, shortness of breath and wheezing.    Cardiovascular: Negative for chest pain, palpitations and leg swelling.   Gastrointestinal: Negative for abdominal distention, abdominal pain, blood in stool, constipation, diarrhea, nausea and vomiting.   Genitourinary: Negative for decreased urine volume, difficulty urinating, dysuria, frequency, hematuria and urgency.   Musculoskeletal: Positive for arthralgias and gait problem.   Neurological: Negative for dizziness, syncope, speech difficulty, light-headedness and headaches.   Psychiatric/Behavioral: Negative for agitation, confusion and hallucinations. The patient is not nervous/anxious.         Objective:     Vitals:    06/27/22 1423   BP: 136/80   Pulse: 86   Temp: 97.9 °F (36.6 °C)       Physical Exam  Vitals and nursing note reviewed.   Constitutional:       General: She is not in acute distress.     Appearance: She is well-developed. She is not diaphoretic.   HENT:      Head: Normocephalic and atraumatic.   Cardiovascular:      Rate and Rhythm: Normal rate and regular rhythm.      Heart sounds: Normal heart sounds. No murmur heard.  Pulmonary:      Effort: Pulmonary effort is normal. No respiratory distress.      Breath sounds: Normal breath sounds.   Musculoskeletal:         General: Normal range of motion.   Skin:     General: Skin is warm and dry.      Findings: No rash.   Neurological:      Mental Status: She is alert.   Psychiatric:         Behavior: Behavior normal. Behavior is cooperative.         Thought Content: Thought content normal.         Judgment: Judgment normal.          Assessment:       Encounter Diagnoses   Name Primary?    Pre-op exam Yes    Bilateral primary osteoarthritis of knee     Postmenopausal         Plan:     Pre-op exam    Bilateral primary osteoarthritis of knee    Postmenopausal  -     DXA Bone Density Spine And Hip; Future; Expected date: 06/27/2022      I  reviewed the patient's past medical, surgical, social and family history and with  physical exam findings and the proposed surgery and I make the following recommendations:     From a cardiac standpoint the patient is low risk for surgery with a low risk surgery. Patient has no evidence of cardiac symptomatology or cardiac diagnoses. The patient may proceed with surgery without further cardiac workup.     From a pulmonary standpoint the patient presents as a good candidate as well. The patient has no history of lung disease or pulmonary symptoms. Good pulmonary toilet, incentive spirometry, early ambulation are all recommended to improve the pulmonary outcome. No further pulmonary workup as noted prior to surgery.     DVT prophylaxis should be per standard. Venous compression devices are recommended. Early ambulation. Patient has been educated on signs and symptoms of both DVT and pulmonary embolus and instructed on what to do if there are symptoms postop.     The patient has been instructed to take blood pressure medication the morning of surgery with a sip of water. Avoid any aspirin or anti-inflammatories between now and surgery.     Pre op labs, cxr, ekg reviewed, stable    If there is any further I can do to assist in the care of this patient please not hesitate to contact me.       Brie Parmar, SCARLETT

## 2022-06-28 ENCOUNTER — TELEPHONE (OUTPATIENT)
Dept: INTERNAL MEDICINE | Facility: CLINIC | Age: 72
End: 2022-06-28
Payer: MEDICARE

## 2022-06-29 ENCOUNTER — TELEPHONE (OUTPATIENT)
Dept: INTERNAL MEDICINE | Facility: CLINIC | Age: 72
End: 2022-06-29
Payer: MEDICARE

## 2022-07-12 ENCOUNTER — TELEPHONE (OUTPATIENT)
Dept: INTERNAL MEDICINE | Facility: CLINIC | Age: 72
End: 2022-07-12
Payer: MEDICARE

## 2022-07-12 NOTE — TELEPHONE ENCOUNTER
----- Message from Ino Cook sent at 7/12/2022  3:03 PM CDT -----  [facility] is needing a call back regarding clarification on a medication for the pt. Please call [phone]

## 2022-10-03 ENCOUNTER — LAB VISIT (OUTPATIENT)
Dept: LAB | Facility: HOSPITAL | Age: 72
End: 2022-10-03
Attending: FAMILY MEDICINE
Payer: MEDICARE

## 2022-10-03 DIAGNOSIS — E78.5 DYSLIPIDEMIA: ICD-10-CM

## 2022-10-03 DIAGNOSIS — N18.31 STAGE 3A CHRONIC KIDNEY DISEASE: ICD-10-CM

## 2022-10-03 DIAGNOSIS — R73.09 ABNORMAL GLUCOSE: ICD-10-CM

## 2022-10-03 DIAGNOSIS — I10 HYPERTENSION, UNSPECIFIED TYPE: ICD-10-CM

## 2022-10-03 LAB
ALBUMIN SERPL BCP-MCNC: 3.8 G/DL (ref 3.5–5.2)
ALP SERPL-CCNC: 61 U/L (ref 55–135)
ALT SERPL W/O P-5'-P-CCNC: 7 U/L (ref 10–44)
ANION GAP SERPL CALC-SCNC: 11 MMOL/L (ref 8–16)
AST SERPL-CCNC: 12 U/L (ref 10–40)
BASOPHILS # BLD AUTO: 0.05 K/UL (ref 0–0.2)
BASOPHILS NFR BLD: 1.2 % (ref 0–1.9)
BILIRUB SERPL-MCNC: 0.4 MG/DL (ref 0.1–1)
BUN SERPL-MCNC: 17 MG/DL (ref 8–23)
CALCIUM SERPL-MCNC: 9.6 MG/DL (ref 8.7–10.5)
CHLORIDE SERPL-SCNC: 108 MMOL/L (ref 95–110)
CHOLEST SERPL-MCNC: 194 MG/DL (ref 120–199)
CHOLEST/HDLC SERPL: 3.3 {RATIO} (ref 2–5)
CO2 SERPL-SCNC: 22 MMOL/L (ref 23–29)
CREAT SERPL-MCNC: 1.3 MG/DL (ref 0.5–1.4)
DIFFERENTIAL METHOD: ABNORMAL
EOSINOPHIL # BLD AUTO: 0.2 K/UL (ref 0–0.5)
EOSINOPHIL NFR BLD: 4 % (ref 0–8)
ERYTHROCYTE [DISTWIDTH] IN BLOOD BY AUTOMATED COUNT: 16.9 % (ref 11.5–14.5)
EST. GFR  (NO RACE VARIABLE): 43.1 ML/MIN/1.73 M^2
ESTIMATED AVG GLUCOSE: 114 MG/DL (ref 68–131)
GLUCOSE SERPL-MCNC: 96 MG/DL (ref 70–110)
HBA1C MFR BLD: 5.6 % (ref 4–5.6)
HCT VFR BLD AUTO: 41.2 % (ref 37–48.5)
HDLC SERPL-MCNC: 58 MG/DL (ref 40–75)
HDLC SERPL: 29.9 % (ref 20–50)
HGB BLD-MCNC: 12.5 G/DL (ref 12–16)
IMM GRANULOCYTES # BLD AUTO: 0.01 K/UL (ref 0–0.04)
IMM GRANULOCYTES NFR BLD AUTO: 0.2 % (ref 0–0.5)
LDLC SERPL CALC-MCNC: 122 MG/DL (ref 63–159)
LYMPHOCYTES # BLD AUTO: 1.4 K/UL (ref 1–4.8)
LYMPHOCYTES NFR BLD: 33.7 % (ref 18–48)
MCH RBC QN AUTO: 24.5 PG (ref 27–31)
MCHC RBC AUTO-ENTMCNC: 30.3 G/DL (ref 32–36)
MCV RBC AUTO: 81 FL (ref 82–98)
MONOCYTES # BLD AUTO: 0.3 K/UL (ref 0.3–1)
MONOCYTES NFR BLD: 7 % (ref 4–15)
NEUTROPHILS # BLD AUTO: 2.3 K/UL (ref 1.8–7.7)
NEUTROPHILS NFR BLD: 53.9 % (ref 38–73)
NONHDLC SERPL-MCNC: 136 MG/DL
NRBC BLD-RTO: 0 /100 WBC
PLATELET # BLD AUTO: 248 K/UL (ref 150–450)
PMV BLD AUTO: 11.7 FL (ref 9.2–12.9)
POTASSIUM SERPL-SCNC: 3.3 MMOL/L (ref 3.5–5.1)
PROT SERPL-MCNC: 7.7 G/DL (ref 6–8.4)
RBC # BLD AUTO: 5.1 M/UL (ref 4–5.4)
SODIUM SERPL-SCNC: 141 MMOL/L (ref 136–145)
TRIGL SERPL-MCNC: 70 MG/DL (ref 30–150)
TSH SERPL DL<=0.005 MIU/L-ACNC: 1.72 UIU/ML (ref 0.4–4)
WBC # BLD AUTO: 4.27 K/UL (ref 3.9–12.7)

## 2022-10-03 PROCEDURE — 84443 ASSAY THYROID STIM HORMONE: CPT | Performed by: FAMILY MEDICINE

## 2022-10-03 PROCEDURE — 83036 HEMOGLOBIN GLYCOSYLATED A1C: CPT | Performed by: FAMILY MEDICINE

## 2022-10-03 PROCEDURE — 80061 LIPID PANEL: CPT | Performed by: FAMILY MEDICINE

## 2022-10-03 PROCEDURE — 80053 COMPREHEN METABOLIC PANEL: CPT | Performed by: FAMILY MEDICINE

## 2022-10-03 PROCEDURE — 85025 COMPLETE CBC W/AUTO DIFF WBC: CPT | Performed by: FAMILY MEDICINE

## 2022-10-03 PROCEDURE — 36415 COLL VENOUS BLD VENIPUNCTURE: CPT | Mod: PO | Performed by: FAMILY MEDICINE

## 2022-10-18 ENCOUNTER — OFFICE VISIT (OUTPATIENT)
Dept: INTERNAL MEDICINE | Facility: CLINIC | Age: 72
End: 2022-10-18
Payer: MEDICARE

## 2022-10-18 VITALS
BODY MASS INDEX: 28.82 KG/M2 | TEMPERATURE: 98 F | DIASTOLIC BLOOD PRESSURE: 86 MMHG | WEIGHT: 183.63 LBS | SYSTOLIC BLOOD PRESSURE: 126 MMHG | HEART RATE: 86 BPM | HEIGHT: 67 IN

## 2022-10-18 DIAGNOSIS — R73.03 PREDIABETES: ICD-10-CM

## 2022-10-18 DIAGNOSIS — N18.31 STAGE 3A CHRONIC KIDNEY DISEASE: ICD-10-CM

## 2022-10-18 DIAGNOSIS — F41.1 GAD (GENERALIZED ANXIETY DISORDER): ICD-10-CM

## 2022-10-18 DIAGNOSIS — Z00.00 ANNUAL PHYSICAL EXAM: Primary | ICD-10-CM

## 2022-10-18 PROCEDURE — 1160F RVW MEDS BY RX/DR IN RCRD: CPT | Mod: CPTII,S$GLB,, | Performed by: NURSE PRACTITIONER

## 2022-10-18 PROCEDURE — 1160F PR REVIEW ALL MEDS BY PRESCRIBER/CLIN PHARMACIST DOCUMENTED: ICD-10-PCS | Mod: CPTII,S$GLB,, | Performed by: NURSE PRACTITIONER

## 2022-10-18 PROCEDURE — 1126F PR PAIN SEVERITY QUANTIFIED, NO PAIN PRESENT: ICD-10-PCS | Mod: CPTII,S$GLB,, | Performed by: NURSE PRACTITIONER

## 2022-10-18 PROCEDURE — 1126F AMNT PAIN NOTED NONE PRSNT: CPT | Mod: CPTII,S$GLB,, | Performed by: NURSE PRACTITIONER

## 2022-10-18 PROCEDURE — 3079F PR MOST RECENT DIASTOLIC BLOOD PRESSURE 80-89 MM HG: ICD-10-PCS | Mod: CPTII,S$GLB,, | Performed by: NURSE PRACTITIONER

## 2022-10-18 PROCEDURE — 3044F PR MOST RECENT HEMOGLOBIN A1C LEVEL <7.0%: ICD-10-PCS | Mod: CPTII,S$GLB,, | Performed by: NURSE PRACTITIONER

## 2022-10-18 PROCEDURE — 3074F PR MOST RECENT SYSTOLIC BLOOD PRESSURE < 130 MM HG: ICD-10-PCS | Mod: CPTII,S$GLB,, | Performed by: NURSE PRACTITIONER

## 2022-10-18 PROCEDURE — 3288F PR FALLS RISK ASSESSMENT DOCUMENTED: ICD-10-PCS | Mod: CPTII,S$GLB,, | Performed by: NURSE PRACTITIONER

## 2022-10-18 PROCEDURE — 99999 PR PBB SHADOW E&M-EST. PATIENT-LVL IV: ICD-10-PCS | Mod: PBBFAC,,, | Performed by: NURSE PRACTITIONER

## 2022-10-18 PROCEDURE — 99999 PR PBB SHADOW E&M-EST. PATIENT-LVL IV: CPT | Mod: PBBFAC,,, | Performed by: NURSE PRACTITIONER

## 2022-10-18 PROCEDURE — 1101F PR PT FALLS ASSESS DOC 0-1 FALLS W/OUT INJ PAST YR: ICD-10-PCS | Mod: CPTII,S$GLB,, | Performed by: NURSE PRACTITIONER

## 2022-10-18 PROCEDURE — 3044F HG A1C LEVEL LT 7.0%: CPT | Mod: CPTII,S$GLB,, | Performed by: NURSE PRACTITIONER

## 2022-10-18 PROCEDURE — 1101F PT FALLS ASSESS-DOCD LE1/YR: CPT | Mod: CPTII,S$GLB,, | Performed by: NURSE PRACTITIONER

## 2022-10-18 PROCEDURE — 1159F MED LIST DOCD IN RCRD: CPT | Mod: CPTII,S$GLB,, | Performed by: NURSE PRACTITIONER

## 2022-10-18 PROCEDURE — 3079F DIAST BP 80-89 MM HG: CPT | Mod: CPTII,S$GLB,, | Performed by: NURSE PRACTITIONER

## 2022-10-18 PROCEDURE — 3288F FALL RISK ASSESSMENT DOCD: CPT | Mod: CPTII,S$GLB,, | Performed by: NURSE PRACTITIONER

## 2022-10-18 PROCEDURE — 1159F PR MEDICATION LIST DOCUMENTED IN MEDICAL RECORD: ICD-10-PCS | Mod: CPTII,S$GLB,, | Performed by: NURSE PRACTITIONER

## 2022-10-18 PROCEDURE — 3074F SYST BP LT 130 MM HG: CPT | Mod: CPTII,S$GLB,, | Performed by: NURSE PRACTITIONER

## 2022-10-18 PROCEDURE — 99397 PR PREVENTIVE VISIT,EST,65 & OVER: ICD-10-PCS | Mod: S$GLB,,, | Performed by: NURSE PRACTITIONER

## 2022-10-18 PROCEDURE — 99397 PER PM REEVAL EST PAT 65+ YR: CPT | Mod: S$GLB,,, | Performed by: NURSE PRACTITIONER

## 2022-10-18 RX ORDER — BUSPIRONE HYDROCHLORIDE 10 MG/1
10 TABLET ORAL 3 TIMES DAILY
Qty: 90 TABLET | Refills: 11 | Status: SHIPPED | OUTPATIENT
Start: 2022-10-18 | End: 2023-04-19 | Stop reason: SDUPTHER

## 2022-10-18 NOTE — PATIENT INSTRUCTIONS
You are a candidate to receive the new shingles vaccination.  It is called Shingrix. Due to medicare rules we can not give it to you in the clinic.  You will need to get it at the pharmacy.  You can check with your local pharmacy to arrange to get the vaccination.  It is a 2 shot series.  You will get the first shot and then a second shot between 2 and 6 months later.

## 2022-10-18 NOTE — PROGRESS NOTES
"Subjective:       Patient ID: Tali Fitzpatrick is a 72 y.o. female.    Chief Complaint: Follow-up    72 year old female here for wellness  Doing well  Bowels move ok, takes stool softeners  Mood is anxious. On celexa and admits to using xanax daily- sometimes needs 2 -3 tablets if needed  Sleep is ok  No falls    Follow-up  Pertinent negatives include no abdominal pain, chest pain, chills, coughing, diaphoresis, fatigue, fever, headaches, nausea or vomiting.     /86   Pulse 86   Temp 98.2 °F (36.8 °C) (Temporal)   Ht 5' 7" (1.702 m)   Wt 83.3 kg (183 lb 10.3 oz)   BMI 28.76 kg/m²     Review of Systems   Constitutional:  Negative for appetite change, chills, diaphoresis, fatigue and fever.   HENT: Negative.     Eyes:  Negative for visual disturbance.   Respiratory:  Negative for cough, shortness of breath and wheezing.    Cardiovascular:  Negative for chest pain, palpitations and leg swelling.   Gastrointestinal:  Negative for abdominal distention, abdominal pain, blood in stool, constipation, diarrhea, nausea and vomiting.   Genitourinary:  Negative for decreased urine volume, difficulty urinating, dysuria, frequency, hematuria and urgency.   Neurological: Negative.  Negative for dizziness, syncope, speech difficulty, light-headedness and headaches.   Psychiatric/Behavioral:  Negative for agitation, confusion and hallucinations. The patient is not nervous/anxious.      Objective:      Physical Exam  Vitals and nursing note reviewed.   Constitutional:       General: She is not in acute distress.     Appearance: Normal appearance. She is well-developed. She is not diaphoretic.   HENT:      Head: Normocephalic and atraumatic.      Right Ear: Hearing, tympanic membrane, ear canal and external ear normal.      Left Ear: Hearing, tympanic membrane, ear canal and external ear normal.      Nose: Nose normal. No mucosal edema or rhinorrhea.      Right Sinus: No maxillary sinus tenderness or frontal sinus " tenderness.      Left Sinus: No maxillary sinus tenderness or frontal sinus tenderness.      Mouth/Throat:      Pharynx: Uvula midline. No oropharyngeal exudate or posterior oropharyngeal erythema.   Eyes:      General:         Right eye: No discharge.         Left eye: No discharge.      Conjunctiva/sclera: Conjunctivae normal.      Pupils: Pupils are equal, round, and reactive to light.   Neck:      Thyroid: No thyroid mass or thyromegaly.      Vascular: No JVD.      Trachea: Trachea normal. No tracheal deviation.   Cardiovascular:      Rate and Rhythm: Normal rate and regular rhythm.      Heart sounds: Normal heart sounds. No murmur heard.  Pulmonary:      Effort: Pulmonary effort is normal. No respiratory distress.      Breath sounds: Normal breath sounds. No stridor. No decreased breath sounds, wheezing, rhonchi or rales.   Chest:      Chest wall: No tenderness.   Abdominal:      General: Bowel sounds are normal. There is no distension.      Palpations: Abdomen is soft. Abdomen is not rigid. There is no fluid wave.      Tenderness: There is no abdominal tenderness. There is no guarding or rebound.   Musculoskeletal:         General: Normal range of motion.      Cervical back: Normal range of motion.   Lymphadenopathy:      Head:      Right side of head: No tonsillar adenopathy.      Left side of head: No tonsillar adenopathy.      Cervical: No cervical adenopathy.   Skin:     General: Skin is warm and dry.      Findings: No rash.   Neurological:      Mental Status: She is alert and oriented to person, place, and time.   Psychiatric:         Speech: Speech normal.         Behavior: Behavior normal. Behavior is cooperative.         Thought Content: Thought content normal.         Judgment: Judgment normal.       Assessment:       1. Annual physical exam    2. CALVIN (generalized anxiety disorder)    3. Stage 3a chronic kidney disease    4. Prediabetes        Plan:       Tali was seen today for  follow-up.    Diagnoses and all orders for this visit:    Annual physical exam  Discussed age appropriate anticipatory guidance, healthy diet and lifestyle, regular exercise, weight management.  Reviewed labs    CALVIN (generalized anxiety disorder)  -     busPIRone (BUSPAR) 10 MG tablet; Take 1 tablet (10 mg total) by mouth 3 (three) times daily.  Not controlled  Continue celexa  Patient admits taking 2 xanax at times, sometimes 3  Add buspar up to tid to decrease xanax use    Stage 3a chronic kidney disease  -     Basic Metabolic Panel; Future  Avoid nsaids, increase water    Prediabetes  -     Hemoglobin A1C; Future  Improved, discussed diet, lifestyle     Patient Instructions   You are a candidate to receive the new shingles vaccination.  It is called Shingrix. Due to medicare rules we can not give it to you in the clinic.  You will need to get it at the pharmacy.  You can check with your local pharmacy to arrange to get the vaccination.  It is a 2 shot series.  You will get the first shot and then a second shot between 2 and 6 months later.    Follow up Dr. SPARKS 6 months with lab prior

## 2022-11-23 ENCOUNTER — APPOINTMENT (OUTPATIENT)
Dept: RADIOLOGY | Facility: HOSPITAL | Age: 72
End: 2022-11-23
Attending: NURSE PRACTITIONER
Payer: MEDICARE

## 2022-11-23 DIAGNOSIS — Z78.0 POSTMENOPAUSAL: ICD-10-CM

## 2022-11-23 PROCEDURE — 77080 DEXA BONE DENSITY SPINE HIP: ICD-10-PCS | Mod: 26,,, | Performed by: RADIOLOGY

## 2022-11-23 PROCEDURE — 77080 DXA BONE DENSITY AXIAL: CPT | Mod: TC

## 2022-11-23 PROCEDURE — 77080 DXA BONE DENSITY AXIAL: CPT | Mod: 26,,, | Performed by: RADIOLOGY

## 2023-02-09 DIAGNOSIS — Z00.00 ENCOUNTER FOR MEDICARE ANNUAL WELLNESS EXAM: ICD-10-CM

## 2023-03-24 NOTE — TELEPHONE ENCOUNTER
No new care gaps identified.  [facility] Embedded Care Gaps. Reference number: [id number]. 3/24/2023   10:34:18 AM ELIAZART

## 2023-03-24 NOTE — TELEPHONE ENCOUNTER
----- Message from [facility] sent at 3/24/2023 10:18 AM CDT -----  Type:  Needs Medical Advice    Who Called: Pt   Would the patient rather a call back or a response via [facility]? Callback   Best Call Back Number: [phone]  Additional Information: Pt requesting callback from office to discuss prescription refill issues. Medication is Xanax 0.5 mg.

## 2023-03-27 RX ORDER — ALPRAZOLAM 0.5 MG/1
0.5 TABLET ORAL NIGHTLY PRN
Qty: 30 TABLET | Refills: 5 | Status: SHIPPED | OUTPATIENT
Start: 2023-03-27 | End: 2023-10-16

## 2023-04-14 ENCOUNTER — LAB VISIT (OUTPATIENT)
Dept: LAB | Facility: HOSPITAL | Age: 73
End: 2023-04-14
Attending: NURSE PRACTITIONER
Payer: MEDICARE

## 2023-04-14 DIAGNOSIS — N18.31 STAGE 3A CHRONIC KIDNEY DISEASE: ICD-10-CM

## 2023-04-14 DIAGNOSIS — R73.03 PREDIABETES: ICD-10-CM

## 2023-04-14 LAB
ANION GAP SERPL CALC-SCNC: 10 MMOL/L (ref 8–16)
BUN SERPL-MCNC: 21 MG/DL (ref 8–23)
CALCIUM SERPL-MCNC: 9.2 MG/DL (ref 8.7–10.5)
CHLORIDE SERPL-SCNC: 106 MMOL/L (ref 95–110)
CO2 SERPL-SCNC: 26 MMOL/L (ref 23–29)
CREAT SERPL-MCNC: 1.5 MG/DL (ref 0.5–1.4)
EST. GFR  (NO RACE VARIABLE): 36.3 ML/MIN/1.73 M^2
ESTIMATED AVG GLUCOSE: 114 MG/DL (ref 68–131)
GLUCOSE SERPL-MCNC: 85 MG/DL (ref 70–110)
HBA1C MFR BLD: 5.6 % (ref 4–5.6)
POTASSIUM SERPL-SCNC: 4 MMOL/L (ref 3.5–5.1)
SODIUM SERPL-SCNC: 142 MMOL/L (ref 136–145)

## 2023-04-14 PROCEDURE — 83036 HEMOGLOBIN GLYCOSYLATED A1C: CPT | Mod: HCNC | Performed by: NURSE PRACTITIONER

## 2023-04-14 PROCEDURE — 80048 BASIC METABOLIC PNL TOTAL CA: CPT | Mod: HCNC | Performed by: NURSE PRACTITIONER

## 2023-04-14 PROCEDURE — 36415 COLL VENOUS BLD VENIPUNCTURE: CPT | Mod: HCNC,PO | Performed by: NURSE PRACTITIONER

## 2023-04-19 ENCOUNTER — OFFICE VISIT (OUTPATIENT)
Dept: INTERNAL MEDICINE | Facility: CLINIC | Age: 73
End: 2023-04-19
Payer: MEDICARE

## 2023-04-19 VITALS
HEIGHT: 67 IN | DIASTOLIC BLOOD PRESSURE: 88 MMHG | TEMPERATURE: 97 F | OXYGEN SATURATION: 99 % | WEIGHT: 182.75 LBS | HEART RATE: 69 BPM | BODY MASS INDEX: 28.68 KG/M2 | SYSTOLIC BLOOD PRESSURE: 136 MMHG

## 2023-04-19 DIAGNOSIS — I10 HYPERTENSION, UNSPECIFIED TYPE: ICD-10-CM

## 2023-04-19 DIAGNOSIS — E78.5 DYSLIPIDEMIA: ICD-10-CM

## 2023-04-19 DIAGNOSIS — N18.32 STAGE 3B CHRONIC KIDNEY DISEASE: ICD-10-CM

## 2023-04-19 DIAGNOSIS — F41.1 GAD (GENERALIZED ANXIETY DISORDER): Primary | ICD-10-CM

## 2023-04-19 DIAGNOSIS — R73.03 PREDIABETES: ICD-10-CM

## 2023-04-19 DIAGNOSIS — R41.3 MEMORY LOSS: ICD-10-CM

## 2023-04-19 DIAGNOSIS — M25.562 LEFT KNEE PAIN, UNSPECIFIED CHRONICITY: Primary | ICD-10-CM

## 2023-04-19 PROCEDURE — 3008F BODY MASS INDEX DOCD: CPT | Mod: HCNC,CPTII,S$GLB, | Performed by: NURSE PRACTITIONER

## 2023-04-19 PROCEDURE — 3079F PR MOST RECENT DIASTOLIC BLOOD PRESSURE 80-89 MM HG: ICD-10-PCS | Mod: HCNC,CPTII,S$GLB, | Performed by: NURSE PRACTITIONER

## 2023-04-19 PROCEDURE — 1159F PR MEDICATION LIST DOCUMENTED IN MEDICAL RECORD: ICD-10-PCS | Mod: HCNC,CPTII,S$GLB, | Performed by: NURSE PRACTITIONER

## 2023-04-19 PROCEDURE — 99214 OFFICE O/P EST MOD 30 MIN: CPT | Mod: HCNC,S$GLB,, | Performed by: NURSE PRACTITIONER

## 2023-04-19 PROCEDURE — 99214 PR OFFICE/OUTPT VISIT, EST, LEVL IV, 30-39 MIN: ICD-10-PCS | Mod: HCNC,S$GLB,, | Performed by: NURSE PRACTITIONER

## 2023-04-19 PROCEDURE — 99999 PR PBB SHADOW E&M-EST. PATIENT-LVL V: ICD-10-PCS | Mod: PBBFAC,HCNC,, | Performed by: NURSE PRACTITIONER

## 2023-04-19 PROCEDURE — 1126F AMNT PAIN NOTED NONE PRSNT: CPT | Mod: HCNC,CPTII,S$GLB, | Performed by: NURSE PRACTITIONER

## 2023-04-19 PROCEDURE — 3288F FALL RISK ASSESSMENT DOCD: CPT | Mod: HCNC,CPTII,S$GLB, | Performed by: NURSE PRACTITIONER

## 2023-04-19 PROCEDURE — 3288F PR FALLS RISK ASSESSMENT DOCUMENTED: ICD-10-PCS | Mod: HCNC,CPTII,S$GLB, | Performed by: NURSE PRACTITIONER

## 2023-04-19 PROCEDURE — 1159F MED LIST DOCD IN RCRD: CPT | Mod: HCNC,CPTII,S$GLB, | Performed by: NURSE PRACTITIONER

## 2023-04-19 PROCEDURE — 3044F HG A1C LEVEL LT 7.0%: CPT | Mod: HCNC,CPTII,S$GLB, | Performed by: NURSE PRACTITIONER

## 2023-04-19 PROCEDURE — 99999 PR PBB SHADOW E&M-EST. PATIENT-LVL V: CPT | Mod: PBBFAC,HCNC,, | Performed by: NURSE PRACTITIONER

## 2023-04-19 PROCEDURE — 1101F PT FALLS ASSESS-DOCD LE1/YR: CPT | Mod: HCNC,CPTII,S$GLB, | Performed by: NURSE PRACTITIONER

## 2023-04-19 PROCEDURE — 3008F PR BODY MASS INDEX (BMI) DOCUMENTED: ICD-10-PCS | Mod: HCNC,CPTII,S$GLB, | Performed by: NURSE PRACTITIONER

## 2023-04-19 PROCEDURE — 3079F DIAST BP 80-89 MM HG: CPT | Mod: HCNC,CPTII,S$GLB, | Performed by: NURSE PRACTITIONER

## 2023-04-19 PROCEDURE — 1160F RVW MEDS BY RX/DR IN RCRD: CPT | Mod: HCNC,CPTII,S$GLB, | Performed by: NURSE PRACTITIONER

## 2023-04-19 PROCEDURE — 1101F PR PT FALLS ASSESS DOC 0-1 FALLS W/OUT INJ PAST YR: ICD-10-PCS | Mod: HCNC,CPTII,S$GLB, | Performed by: NURSE PRACTITIONER

## 2023-04-19 PROCEDURE — 1160F PR REVIEW ALL MEDS BY PRESCRIBER/CLIN PHARMACIST DOCUMENTED: ICD-10-PCS | Mod: HCNC,CPTII,S$GLB, | Performed by: NURSE PRACTITIONER

## 2023-04-19 PROCEDURE — 1126F PR PAIN SEVERITY QUANTIFIED, NO PAIN PRESENT: ICD-10-PCS | Mod: HCNC,CPTII,S$GLB, | Performed by: NURSE PRACTITIONER

## 2023-04-19 PROCEDURE — 3075F SYST BP GE 130 - 139MM HG: CPT | Mod: HCNC,CPTII,S$GLB, | Performed by: NURSE PRACTITIONER

## 2023-04-19 PROCEDURE — 3075F PR MOST RECENT SYSTOLIC BLOOD PRESS GE 130-139MM HG: ICD-10-PCS | Mod: HCNC,CPTII,S$GLB, | Performed by: NURSE PRACTITIONER

## 2023-04-19 PROCEDURE — 3044F PR MOST RECENT HEMOGLOBIN A1C LEVEL <7.0%: ICD-10-PCS | Mod: HCNC,CPTII,S$GLB, | Performed by: NURSE PRACTITIONER

## 2023-04-19 RX ORDER — BUSPIRONE HYDROCHLORIDE 10 MG/1
10 TABLET ORAL 3 TIMES DAILY PRN
Qty: 90 TABLET | Refills: 11 | Status: SHIPPED | OUTPATIENT
Start: 2023-04-19 | End: 2024-02-06

## 2023-04-19 RX ORDER — CITALOPRAM 40 MG/1
40 TABLET, FILM COATED ORAL DAILY
Qty: 90 TABLET | Refills: 3 | Status: SHIPPED | OUTPATIENT
Start: 2023-04-19 | End: 2024-02-06 | Stop reason: SDUPTHER

## 2023-04-19 RX ORDER — ATENOLOL 50 MG/1
50 TABLET ORAL DAILY
Qty: 90 TABLET | Refills: 3 | Status: SHIPPED | OUTPATIENT
Start: 2023-04-19 | End: 2024-02-06 | Stop reason: SDUPTHER

## 2023-04-19 RX ORDER — AMLODIPINE BESYLATE 5 MG/1
5 TABLET ORAL DAILY
Qty: 90 TABLET | Refills: 3 | Status: SHIPPED | OUTPATIENT
Start: 2023-04-19 | End: 2024-02-06 | Stop reason: SDUPTHER

## 2023-04-19 RX ORDER — PRAVASTATIN SODIUM 20 MG/1
20 TABLET ORAL DAILY
Qty: 90 TABLET | Refills: 3 | Status: SHIPPED | OUTPATIENT
Start: 2023-04-19 | End: 2024-02-06 | Stop reason: SDUPTHER

## 2023-04-19 NOTE — PROGRESS NOTES
"Subjective:       Patient ID: Tali Fitzpatrick is a 72 y.o. female.    Chief Complaint: Follow-up    72 year old female here for 6 month follow up  Not taking celexa; on buspar (tid prn but admits only taking hs) admits taking xanax nightly every day  Bowels move every 2 days; needs stool softener and senna laxative  Sleep is good when she takes xanax  Recent labs shows table a1c but worsening ckd  Does not take nsaids; drinks lots of water but does admit forgetting atenolol sometimes  Feels she is having memory issues and becoming forgetful. Wants to see neuro in [place]     Follow-up  Pertinent negatives include no chest pain, chills, coughing, diaphoresis, fatigue, fever or rash.   /88   Pulse 69   Temp 96.6 °F (35.9 °C) (Tympanic)   Ht 5' 7" (1.702 m)   Wt 82.9 kg (182 lb 12.2 oz)   SpO2 99%   BMI 28.62 kg/m²     Review of Systems   Constitutional:  Negative for activity change, appetite change, chills, diaphoresis, fatigue, fever and unexpected weight change.   HENT: Negative.     Respiratory:  Negative for cough and shortness of breath.    Cardiovascular:  Negative for chest pain, palpitations and leg swelling.   Gastrointestinal: Negative.    Genitourinary: Negative.    Musculoskeletal: Negative.    Skin:  Negative for color change, pallor, rash and wound.   Allergic/Immunologic: Negative for immunocompromised state.   Neurological: Negative.  Negative for dizziness and facial asymmetry.   Hematological:  Negative for adenopathy. Does not bruise/bleed easily.   Psychiatric/Behavioral:  Negative for agitation, behavioral problems and confusion. The patient is nervous/anxious.      Objective:      Physical Exam  Vitals and nursing note reviewed.   Constitutional:       General: She is not in acute distress.     Appearance: Normal appearance. She is well-developed. She is not diaphoretic.   HENT:      Head: Normocephalic and atraumatic.   Cardiovascular:      Rate and Rhythm: Normal rate and regular " rhythm.      Heart sounds: Normal heart sounds. No murmur heard.  Pulmonary:      Effort: Pulmonary effort is normal. No respiratory distress.      Breath sounds: Normal breath sounds.   Musculoskeletal:         General: Normal range of motion.   Skin:     General: Skin is warm and dry.      Findings: No rash.   Neurological:      Mental Status: She is alert.   Psychiatric:         Mood and Affect: Mood is anxious.         Behavior: Behavior normal. Behavior is cooperative.         Thought Content: Thought content normal.         Judgment: Judgment normal.       Assessment:       1. CALVIN (generalized anxiety disorder)    2. Hypertension, unspecified type    3. Dyslipidemia    4. Prediabetes    5. Stage 3b chronic kidney disease    6. Memory loss        Plan:       Tali was seen today for follow-up.    Diagnoses and all orders for this visit:    CALVIN (generalized anxiety disorder)  -     citalopram (CELEXA) 40 MG tablet; Take 1 tablet (40 mg total) by mouth once daily.  -     busPIRone (BUSPAR) 10 MG tablet; Take 1 tablet (10 mg total) by mouth 3 (three) times daily as needed (anxiety).  -     CBC Auto Differential; Future  -     TSH; Future  Not taking celexa  Using xanax every night  Discussed that xanax is habit forming and a controlled substance. She should not be taking this daily, just prn  Restart celexa  Use buspar up to tid prn anxiety  Can use xanax OCCASIONALLY prn severe anxiety but stressed this is not a daily med.     Hypertension, unspecified type  -     atenoloL (TENORMIN) 50 MG tablet; Take 1 tablet (50 mg total) by mouth once daily.  -     amLODIPine (NORVASC) 5 MG tablet; Take 1 tablet (5 mg total) by mouth once daily.  -     CBC Auto Differential; Future  -     Comprehensive Metabolic Panel; Future  -     TSH; Future  Compliance discussed    Dyslipidemia  -     pravastatin (PRAVACHOL) 20 MG tablet; Take 1 tablet (20 mg total) by mouth once daily.  -     CBC Auto Differential; Future  -     Lipid  Panel; Future  Stable on statin    Prediabetes  -     TSH; Future  -     Hemoglobin A1C; Future  Stable; discussed diet changes    Stage 3b chronic kidney disease  Worsened with recent lab draw.   Bp med compliance advised  Avoid nsaids  Push fluids  Will check again in 3 months    Memory loss  -     Ambulatory referral/consult to Neurology; Future  Wants to see neuro on the [facility]

## 2023-04-26 ENCOUNTER — HOSPITAL ENCOUNTER (OUTPATIENT)
Dept: RADIOLOGY | Facility: HOSPITAL | Age: 73
Discharge: HOME | End: 2023-04-26
Attending: ORTHOPAEDIC SURGERY
Payer: MEDICARE

## 2023-04-26 ENCOUNTER — OFFICE VISIT (OUTPATIENT)
Dept: ORTHOPEDICS | Facility: CLINIC | Age: 73
End: 2023-04-26
Payer: MEDICARE

## 2023-04-26 DIAGNOSIS — M25.562 LEFT KNEE PAIN, UNSPECIFIED CHRONICITY: ICD-10-CM

## 2023-04-26 DIAGNOSIS — M17.12 PRIMARY OSTEOARTHRITIS OF LEFT KNEE: Primary | ICD-10-CM

## 2023-04-26 DIAGNOSIS — M17.10 ARTHRITIS OF KNEE: Primary | ICD-10-CM

## 2023-04-26 PROCEDURE — 1159F MED LIST DOCD IN RCRD: CPT | Mod: HCNC,CPTII,S$GLB, | Performed by: ORTHOPAEDIC SURGERY

## 2023-04-26 PROCEDURE — 3044F HG A1C LEVEL LT 7.0%: CPT | Mod: HCNC,CPTII,S$GLB, | Performed by: ORTHOPAEDIC SURGERY

## 2023-04-26 PROCEDURE — 73562 XR KNEE ORTHO LEFT WITH FLEXION: ICD-10-PCS | Mod: 26,HCNC,RT, | Performed by: RADIOLOGY

## 2023-04-26 PROCEDURE — 99999 PR PBB SHADOW E&M-EST. PATIENT-LVL I: CPT | Mod: PBBFAC,HCNC,, | Performed by: ORTHOPAEDIC SURGERY

## 2023-04-26 PROCEDURE — 3044F PR MOST RECENT HEMOGLOBIN A1C LEVEL <7.0%: ICD-10-PCS | Mod: HCNC,CPTII,S$GLB, | Performed by: ORTHOPAEDIC SURGERY

## 2023-04-26 PROCEDURE — 73562 X-RAY EXAM OF KNEE 3: CPT | Mod: 26,HCNC,RT, | Performed by: RADIOLOGY

## 2023-04-26 PROCEDURE — 1159F PR MEDICATION LIST DOCUMENTED IN MEDICAL RECORD: ICD-10-PCS | Mod: HCNC,CPTII,S$GLB, | Performed by: ORTHOPAEDIC SURGERY

## 2023-04-26 PROCEDURE — 73564 X-RAY EXAM KNEE 4 OR MORE: CPT | Mod: TC,HCNC,PO,LT

## 2023-04-26 PROCEDURE — 99999 PR PBB SHADOW E&M-EST. PATIENT-LVL I: ICD-10-PCS | Mod: PBBFAC,HCNC,, | Performed by: ORTHOPAEDIC SURGERY

## 2023-04-26 PROCEDURE — 73564 X-RAY EXAM KNEE 4 OR MORE: CPT | Mod: 26,HCNC,LT, | Performed by: RADIOLOGY

## 2023-04-26 PROCEDURE — 99204 OFFICE O/P NEW MOD 45 MIN: CPT | Mod: HCNC,S$GLB,, | Performed by: ORTHOPAEDIC SURGERY

## 2023-04-26 PROCEDURE — 73564 XR KNEE ORTHO LEFT WITH FLEXION: ICD-10-PCS | Mod: 26,HCNC,LT, | Performed by: RADIOLOGY

## 2023-04-26 PROCEDURE — 99204 PR OFFICE/OUTPT VISIT, NEW, LEVL IV, 45-59 MIN: ICD-10-PCS | Mod: HCNC,S$GLB,, | Performed by: ORTHOPAEDIC SURGERY

## 2023-04-26 PROCEDURE — 1160F RVW MEDS BY RX/DR IN RCRD: CPT | Mod: HCNC,CPTII,S$GLB, | Performed by: ORTHOPAEDIC SURGERY

## 2023-04-26 PROCEDURE — 1160F PR REVIEW ALL MEDS BY PRESCRIBER/CLIN PHARMACIST DOCUMENTED: ICD-10-PCS | Mod: HCNC,CPTII,S$GLB, | Performed by: ORTHOPAEDIC SURGERY

## 2023-04-26 NOTE — PROGRESS NOTES
Past Medical History:   Diagnosis Date    Anxiety and depression     CKD (chronic kidney disease)     DJD (degenerative joint disease)     bilateral knees   Hyalgan series completed 8/27/14    Hyperlipidemia     Hypertension     Insomnia        Past Surgical History:   Procedure Laterality Date    CATARACT EXTRACTION W/  INTRAOCULAR LENS IMPLANT      left    EYE SURGERY      LIPOSUCTION      RADIOFREQUENCY ABLATION Right 03/04/2022    Procedure: Radiofrequency Ablation;  Surgeon: Deon Ragland MD;  Location: [facility];  Service: Orthopedics;  Laterality: Right;  KNEE-Cooled    TONSILLECTOMY      TOTAL KNEE ARTHROPLASTY         Current Outpatient Medications   Medication Sig    ALPRAZolam (XANAX) 0.5 MG tablet Take 1 tablet (0.5 mg total) by mouth nightly as needed.    amLODIPine (NORVASC) 5 MG tablet Take 1 tablet (5 mg total) by mouth once daily.    atenoloL (TENORMIN) 50 MG tablet Take 1 tablet (50 mg total) by mouth once daily.    busPIRone (BUSPAR) 10 MG tablet Take 1 tablet (10 mg total) by mouth 3 (three) times daily as needed (anxiety).    chlorhexidine (PERIDEX) 0.12 % solution place 15 milliliters in the mouth by mucous membrane route 2 times per day after brushing teeth, swish in mouth for 30 seconds then spit out    citalopram (CELEXA) 40 MG tablet Take 1 tablet (40 mg total) by mouth once daily.    cyanocobalamin (VITAMIN B-12) 1000 MCG tablet Take 1,000 mcg by mouth once daily.    fluticasone propionate (FLONASE) 50 mcg/actuation nasal spray 2 sprays (100 mcg total) by Each Nostril route once daily.    folic acid/multivit-min/lutein (CENTRUM SILVER ORAL) Take 1 tablet by mouth once daily.    HYDROcodone-acetaminophen (NORCO) 7.5-325 mg per tablet Take 1 tablet by mouth every 6 (six) hours as needed for Pain.    hydrOXYzine pamoate (VISTARIL) 50 MG Cap TAKE 1 CAPSULE EVERY 8 (EIGHT) HOURS AS NEEDED.    pantoprazole (PROTONIX) 40 MG tablet Take 1 tablet (40 mg total) by mouth once daily.     pravastatin (PRAVACHOL) 20 MG tablet Take 1 tablet (20 mg total) by mouth once daily.     No current facility-administered medications for this visit.       Review of patient's allergies indicates:   Allergen Reactions    Micardis [telmisartan]      Can't remember       Family History   Problem Relation Age of Onset    Arthritis Mother     Coronary artery disease Mother     Coronary artery disease Father     Colon cancer Neg Hx     Breast cancer Neg Hx     Diabetes Neg Hx        Social History     Socioeconomic History    Marital status:     Number of children: 1   Occupational History    Occupation: Retired   Tobacco Use    Smoking status: Never    Smokeless tobacco: Never   Substance and Sexual Activity    Alcohol use: Yes     Alcohol/week: 0.0 standard drinks     Comment: social    Drug use: Yes     Types: Benzodiazepines       Chief Complaint:   No chief complaint on file.      History of present illness: 72-year-old female with known bilateral knee arthritis seen for left knee pain today.  Patient has been treated for knee arthritis with previous nerve ablations as well as Euflexxa.  She is had cortisone injections and had her right knee replaced previously.  Pain is a 2/10.  Looking for treatments other than surgery.      Review of Systems:    Constitution: Negative for chills, fever, and sweats.  Negative for unexplained weight loss.    HENT:  Negative for headaches and blurry vision.    Cardiovascular:Negative for chest pain or irregular heart beat. Negative for hypertension.    Respiratory:  Negative for cough and shortness of breath.    Gastrointestinal: Negative for abdominal pain, heartburn, melena, nausea, and vomitting.    Genitourinary:  Negative bladder incontinence and dysuria.    Musculoskeletal:  See HPI    Neurological: Negative for numbness.    Psychiatric/Behavioral: Negative for depression.  The patient is not nervous/anxious.      Endocrine: Negative for  polyuria    Hematologic/Lymphatic: Negative for bleeding problem.  Does not bruise/bleed easily.    Skin: Negative for poor would healing and rash      Physical Examination:    Vital Signs:    There were no vitals filed for this visit.      There is no height or weight on file to calculate BMI.    This a well-developed, well nourished patient in no acute distress.  They are alert and oriented and cooperative to examination.  Pt. walks without an antalgic gait.      Examination of the left knee shows no rashes or erythema. There are no masses ecchymosis or effusion. Patient has full range of motion from °. Patient is nontender to palpation over lateral joint line and moderately tender to palpation over the medial joint line. Patient has a - Lachman exam, - anterior drawer exam, and - posterior drawer exam. - Mary's exam. Knee is stable to varus and valgus stress. 5 out of 5 motor strength. Palpable distal pulses. Intact light touch sensation. Negative Patellofemoral crepitus      X-rays: X-rays of both knees are ordered and reviewed which show severe left medial joint space narrowing.  Well-aligned right total knee arthroplasty without complication       Assessment:: Severe left knee arthritis    Plan:  I reviewed the x-rays with her today.  I recommended repeating the Euflexxa since it seemed to help her pretty well.  We will get authorization and start that once approved.    The patient has tried a self guided exercise program that has included walking without significant improvement. Minimal relief with tylenol or OTC Nsaids. Reports less than 8 weeks relief with IA steroid injection. Kellgren Jaswant scale of 4. They are not receiving another HA injectable at this time. I will precert for gel injection.       This note was created using voice recognition software that occasionally misinterpreted phrases or words.    Consult note is delivered via Epic messaging service.

## 2023-04-26 NOTE — PROGRESS NOTES
Past Medical History:   Diagnosis Date    Anxiety and depression     CKD (chronic kidney disease)     DJD (degenerative joint disease)     bilateral knees   Hyalgan series completed 8/27/14    Hyperlipidemia     Hypertension     Insomnia        Past Surgical History:   Procedure Laterality Date    CATARACT EXTRACTION W/  INTRAOCULAR LENS IMPLANT      left    EYE SURGERY      LIPOSUCTION      RADIOFREQUENCY ABLATION Right 03/04/2022    Procedure: Radiofrequency Ablation;  Surgeon: Deon Ragland MD;  Location: [facility];  Service: Orthopedics;  Laterality: Right;  KNEE-Cooled    TONSILLECTOMY      TOTAL KNEE ARTHROPLASTY         Current Outpatient Medications   Medication Sig    ALPRAZolam (XANAX) 0.5 MG tablet Take 1 tablet (0.5 mg total) by mouth nightly as needed.    amLODIPine (NORVASC) 5 MG tablet Take 1 tablet (5 mg total) by mouth once daily.    atenoloL (TENORMIN) 50 MG tablet Take 1 tablet (50 mg total) by mouth once daily.    busPIRone (BUSPAR) 10 MG tablet Take 1 tablet (10 mg total) by mouth 3 (three) times daily as needed (anxiety).    chlorhexidine (PERIDEX) 0.12 % solution place 15 milliliters in the mouth by mucous membrane route 2 times per day after brushing teeth, swish in mouth for 30 seconds then spit out    citalopram (CELEXA) 40 MG tablet Take 1 tablet (40 mg total) by mouth once daily.    cyanocobalamin (VITAMIN B-12) 1000 MCG tablet Take 1,000 mcg by mouth once daily.    fluticasone propionate (FLONASE) 50 mcg/actuation nasal spray 2 sprays (100 mcg total) by Each Nostril route once daily.    folic acid/multivit-min/lutein (CENTRUM SILVER ORAL) Take 1 tablet by mouth once daily.    HYDROcodone-acetaminophen (NORCO) 7.5-325 mg per tablet Take 1 tablet by mouth every 6 (six) hours as needed for Pain.    hydrOXYzine pamoate (VISTARIL) 50 MG Cap TAKE 1 CAPSULE EVERY 8 (EIGHT) HOURS AS NEEDED.    pantoprazole (PROTONIX) 40 MG tablet Take 1 tablet (40 mg total) by mouth once daily.     pravastatin (PRAVACHOL) 20 MG tablet Take 1 tablet (20 mg total) by mouth once daily.     No current facility-administered medications for this visit.       Review of patient's allergies indicates:   Allergen Reactions    Micardis [telmisartan]      Can't remember       Family History   Problem Relation Age of Onset    Arthritis Mother     Coronary artery disease Mother     Coronary artery disease Father     Colon cancer Neg Hx     Breast cancer Neg Hx     Diabetes Neg Hx        Social History     Socioeconomic History    Marital status:     Number of children: 1   Occupational History    Occupation: Retired   Tobacco Use    Smoking status: Never    Smokeless tobacco: Never   Substance and Sexual Activity    Alcohol use: Yes     Alcohol/week: 0.0 standard drinks     Comment: social    Drug use: Yes     Types: Benzodiazepines       Chief Complaint: No chief complaint on file.      History of present illness: ***      Review of Systems:    Constitution: Negative for chills, fever, and sweats.  Negative for unexplained weight loss.    HENT:  Negative for headaches and blurry vision.    Cardiovascular:Negative for chest pain or irregular heart beat. Negative for hypertension.    Respiratory:  Negative for cough and shortness of breath.    Gastrointestinal: Negative for abdominal pain, heartburn, melena, nausea, and vomitting.    Genitourinary:  Negative bladder incontinence and dysuria.    Musculoskeletal:  See HPI    Neurological: Negative for numbness.    Psychiatric/Behavioral: Negative for depression.  The patient is not nervous/anxious.      Endocrine: Negative for polyuria    Hematologic/Lymphatic: Negative for bleeding problem.  Does not bruise/bleed easily.    Skin: Negative for poor would healing and rash      Physical Examination:    Vital Signs:  There were no vitals filed for this visit.    There is no height or weight on file to calculate BMI.    This a well-developed, well nourished patient in no  acute distress.  They are alert and oriented and cooperative to examination.  Pt. walks without an antalgic gait.      ***    X-rays: ***     Assessment::***    Plan:  ***    All previous pertinent notes including ER visits, physical therapy visits, other orthopedic visits as well as other care for the same musculoskeletal problem were reviewed.  All pertinent lab values and previous imaging was reviewed pertinent to the current visit.    This note was created using M Mod[employer] voice recognition software that occasionally misinterpreted phrases or words.    Consult note is delivered via Epic messaging service.

## 2023-04-27 DIAGNOSIS — M17.12 PRIMARY OSTEOARTHRITIS OF LEFT KNEE: Primary | ICD-10-CM

## 2023-05-18 ENCOUNTER — PES CALL (OUTPATIENT)
Dept: ADMINISTRATIVE | Facility: CLINIC | Age: 73
End: 2023-05-18
Payer: MEDICARE

## 2023-07-11 ENCOUNTER — TELEPHONE (OUTPATIENT)
Dept: ADMINISTRATIVE | Facility: HOSPITAL | Age: 73
End: 2023-07-11
Payer: MEDICARE

## 2023-07-24 ENCOUNTER — LAB VISIT (OUTPATIENT)
Dept: LAB | Facility: HOSPITAL | Age: 73
End: 2023-07-24
Attending: NURSE PRACTITIONER
Payer: MEDICARE

## 2023-07-24 ENCOUNTER — OFFICE VISIT (OUTPATIENT)
Dept: NEUROLOGY | Facility: CLINIC | Age: 73
End: 2023-07-24
Payer: MEDICARE

## 2023-07-24 VITALS
RESPIRATION RATE: 16 BRPM | HEIGHT: 67 IN | HEART RATE: 66 BPM | DIASTOLIC BLOOD PRESSURE: 78 MMHG | BODY MASS INDEX: 29.38 KG/M2 | SYSTOLIC BLOOD PRESSURE: 144 MMHG | WEIGHT: 187.19 LBS

## 2023-07-24 DIAGNOSIS — R41.3 OTHER AMNESIA: ICD-10-CM

## 2023-07-24 DIAGNOSIS — R41.3 MEMORY LOSS: ICD-10-CM

## 2023-07-24 DIAGNOSIS — I25.10 CORONARY ARTERY DISEASE, UNSPECIFIED VESSEL OR LESION TYPE, UNSPECIFIED WHETHER ANGINA PRESENT, UNSPECIFIED WHETHER NATIVE OR TRANSPLANTED HEART: ICD-10-CM

## 2023-07-24 DIAGNOSIS — F32.A DEPRESSION, UNSPECIFIED DEPRESSION TYPE: ICD-10-CM

## 2023-07-24 DIAGNOSIS — F41.9 ANXIETY: ICD-10-CM

## 2023-07-24 DIAGNOSIS — Z65.8 PSYCHOSOCIAL STRESSORS: ICD-10-CM

## 2023-07-24 DIAGNOSIS — R41.9 COGNITIVE COMPLAINTS WITH NORMAL EXAM: ICD-10-CM

## 2023-07-24 DIAGNOSIS — Z86.73 HISTORY OF STROKE: ICD-10-CM

## 2023-07-24 DIAGNOSIS — R47.89 WORD FINDING DIFFICULTY: ICD-10-CM

## 2023-07-24 DIAGNOSIS — Z63.0 STRESS DUE TO MARITAL PROBLEMS: ICD-10-CM

## 2023-07-24 DIAGNOSIS — R41.9 COGNITIVE COMPLAINTS WITH NORMAL EXAM: Primary | ICD-10-CM

## 2023-07-24 PROCEDURE — 82607 VITAMIN B-12: CPT | Mod: HCNC | Performed by: NURSE PRACTITIONER

## 2023-07-24 PROCEDURE — 99999 PR PBB SHADOW E&M-EST. PATIENT-LVL V: ICD-10-PCS | Mod: PBBFAC,HCNC,, | Performed by: NURSE PRACTITIONER

## 2023-07-24 PROCEDURE — 82746 ASSAY OF FOLIC ACID SERUM: CPT | Mod: HCNC | Performed by: NURSE PRACTITIONER

## 2023-07-24 PROCEDURE — 3044F HG A1C LEVEL LT 7.0%: CPT | Mod: HCNC,CPTII,S$GLB, | Performed by: NURSE PRACTITIONER

## 2023-07-24 PROCEDURE — 83090 ASSAY OF HOMOCYSTEINE: CPT | Mod: HCNC | Performed by: NURSE PRACTITIONER

## 2023-07-24 PROCEDURE — 99417 PROLNG OP E/M EACH 15 MIN: CPT | Mod: HCNC,S$GLB,, | Performed by: NURSE PRACTITIONER

## 2023-07-24 PROCEDURE — 36415 COLL VENOUS BLD VENIPUNCTURE: CPT | Mod: HCNC | Performed by: NURSE PRACTITIONER

## 2023-07-24 PROCEDURE — 1101F PR PT FALLS ASSESS DOC 0-1 FALLS W/OUT INJ PAST YR: ICD-10-PCS | Mod: HCNC,CPTII,S$GLB, | Performed by: NURSE PRACTITIONER

## 2023-07-24 PROCEDURE — 1125F AMNT PAIN NOTED PAIN PRSNT: CPT | Mod: HCNC,CPTII,S$GLB, | Performed by: NURSE PRACTITIONER

## 2023-07-24 PROCEDURE — 86038 ANTINUCLEAR ANTIBODIES: CPT | Mod: HCNC | Performed by: NURSE PRACTITIONER

## 2023-07-24 PROCEDURE — 3078F DIAST BP <80 MM HG: CPT | Mod: HCNC,CPTII,S$GLB, | Performed by: NURSE PRACTITIONER

## 2023-07-24 PROCEDURE — 3077F PR MOST RECENT SYSTOLIC BLOOD PRESSURE >= 140 MM HG: ICD-10-PCS | Mod: HCNC,CPTII,S$GLB, | Performed by: NURSE PRACTITIONER

## 2023-07-24 PROCEDURE — 1160F RVW MEDS BY RX/DR IN RCRD: CPT | Mod: HCNC,CPTII,S$GLB, | Performed by: NURSE PRACTITIONER

## 2023-07-24 PROCEDURE — 1101F PT FALLS ASSESS-DOCD LE1/YR: CPT | Mod: HCNC,CPTII,S$GLB, | Performed by: NURSE PRACTITIONER

## 2023-07-24 PROCEDURE — 1160F PR REVIEW ALL MEDS BY PRESCRIBER/CLIN PHARMACIST DOCUMENTED: ICD-10-PCS | Mod: HCNC,CPTII,S$GLB, | Performed by: NURSE PRACTITIONER

## 2023-07-24 PROCEDURE — 3077F SYST BP >= 140 MM HG: CPT | Mod: HCNC,CPTII,S$GLB, | Performed by: NURSE PRACTITIONER

## 2023-07-24 PROCEDURE — 3288F PR FALLS RISK ASSESSMENT DOCUMENTED: ICD-10-PCS | Mod: HCNC,CPTII,S$GLB, | Performed by: NURSE PRACTITIONER

## 2023-07-24 PROCEDURE — 99999 PR PBB SHADOW E&M-EST. PATIENT-LVL V: CPT | Mod: PBBFAC,HCNC,, | Performed by: NURSE PRACTITIONER

## 2023-07-24 PROCEDURE — 1159F MED LIST DOCD IN RCRD: CPT | Mod: HCNC,CPTII,S$GLB, | Performed by: NURSE PRACTITIONER

## 2023-07-24 PROCEDURE — 99417 PR PROLONGED SVC, OUTPT, W/WO DIRECT PT CONTACT,  EA ADDTL 15 MIN: ICD-10-PCS | Mod: HCNC,S$GLB,, | Performed by: NURSE PRACTITIONER

## 2023-07-24 PROCEDURE — 3044F PR MOST RECENT HEMOGLOBIN A1C LEVEL <7.0%: ICD-10-PCS | Mod: HCNC,CPTII,S$GLB, | Performed by: NURSE PRACTITIONER

## 2023-07-24 PROCEDURE — 86592 SYPHILIS TEST NON-TREP QUAL: CPT | Mod: HCNC | Performed by: NURSE PRACTITIONER

## 2023-07-24 PROCEDURE — 1125F PR PAIN SEVERITY QUANTIFIED, PAIN PRESENT: ICD-10-PCS | Mod: HCNC,CPTII,S$GLB, | Performed by: NURSE PRACTITIONER

## 2023-07-24 PROCEDURE — 99205 PR OFFICE/OUTPT VISIT, NEW, LEVL V, 60-74 MIN: ICD-10-PCS | Mod: HCNC,S$GLB,, | Performed by: NURSE PRACTITIONER

## 2023-07-24 PROCEDURE — 1159F PR MEDICATION LIST DOCUMENTED IN MEDICAL RECORD: ICD-10-PCS | Mod: HCNC,CPTII,S$GLB, | Performed by: NURSE PRACTITIONER

## 2023-07-24 PROCEDURE — 84443 ASSAY THYROID STIM HORMONE: CPT | Mod: HCNC | Performed by: NURSE PRACTITIONER

## 2023-07-24 PROCEDURE — 3288F FALL RISK ASSESSMENT DOCD: CPT | Mod: HCNC,CPTII,S$GLB, | Performed by: NURSE PRACTITIONER

## 2023-07-24 PROCEDURE — 3078F PR MOST RECENT DIASTOLIC BLOOD PRESSURE < 80 MM HG: ICD-10-PCS | Mod: HCNC,CPTII,S$GLB, | Performed by: NURSE PRACTITIONER

## 2023-07-24 PROCEDURE — 99205 OFFICE O/P NEW HI 60 MIN: CPT | Mod: HCNC,S$GLB,, | Performed by: NURSE PRACTITIONER

## 2023-07-24 PROCEDURE — 87389 HIV-1 AG W/HIV-1&-2 AB AG IA: CPT | Mod: HCNC | Performed by: NURSE PRACTITIONER

## 2023-07-24 SDOH — SOCIAL DETERMINANTS OF HEALTH (SDOH): PROBLEMS IN RELATIONSHIP WITH SPOUSE OR PARTNER: Z63.0

## 2023-07-24 NOTE — PATIENT INSTRUCTIONS
HERE ARE 10 WAYS TO REDUCE RISK OF DEMENTIA AND         1.Be physically active.    2. Avoid smoking and alcohol consumption.    3. Track your numbers. Keep your blood pressure, cholesterol, blood sugar and weight within recommended ranges.    4. Stay socially connected.    5. Make healthy food choices. Eat a well-balance and healthy diet that rich in cereals, fish, legumes, and vegetables.    6. Reduce stress.     7. Challenge your brain by trying something new, playing games, or learning a new language.    8. Take care of your hearing. Avoid being continuously exposed to loud sounds and wear a hearing aid if hearing does become a problem.    9. Lower risk of falls. Consider installing handrails on all stairs and grab bars in bathrooms.    10. Reduce your exposure to air pollution, such as exposure to exhaust in heavy traffic.

## 2023-07-24 NOTE — PROGRESS NOTES
Subjective:       Patient ID: Tali Fitzpatrick is a 73 y.o. female.    Chief Complaint: Memory Loss      Seen in past by Dr.Gerald Cobian, Neurologist (2019)      HPIThe patient is here for memory loss evaluation.     The patient is presenting with memory changes that she states started approximately 2019. The patient discussed initial concern with Dr. Cobian, Neurologist. The patient is accompanied by niece. The main problems the patient has are related to forgetfulness. The patient reports she go in a room and forget why she is there, usually able to recover. Patient has severe anxiety. According to Dr. Cobian's assessment ing 11- patient complaint was related to stress and anxiety. However patient reports she is worse off then her assessment 4 years ago.  The patient excessively forgets where placed certain things. The patient is driving and denies getting lost. The patient is losing personal items such as money she states she haven't recovered it.. No confusion around and inside the house. Patient reports she has trouble remembering the date and time. Patient managed her  medications and appointments. Patient is able to recall major holidays and  aware political changes. The patient is independent in handling finances. The patient is still independent with ADLs. Patient has some agitation. No aggression. No hallucinations or delusions. No seizures. No language problems. No problems handling tools. + history of strokes, noted on MRI Brain, unknown to patient no clear deficts. No history of headaches. No history of hypothyroidism. Stressed due to 98 year old spouse making expensive purchases and martial problems. Mild alcohol use (drinks wine occasionally). No history of B12 deficiency. Chronic depression, and uncontrolled CALVIN.  Takes Xanax  for sleep, she takes Celexa reports it is not helpful. Patient reports she is anxious all the time.  No history of bipolar disorder. No history of Untreated Syphilis.  No  history of HIV infection. No toxic exposures.  No history of traumatic brain injury. No tremors or abnormal movements. No falls or instability. Some urgency urinary incontinence. No change in sleep and appetite. No family history of dementia.         Review of Systems   Constitutional: Negative.    HENT: Negative.     Eyes: Negative.    Respiratory: Negative.     Gastrointestinal: Negative.    Endocrine: Negative.    Genitourinary: Negative.    Musculoskeletal: Negative.    Skin: Negative.    Neurological: Negative.    Hematological: Negative.    Psychiatric/Behavioral:  Positive for decreased concentration, dysphoric mood and sleep disturbance. The patient is nervous/anxious.                  Current Outpatient Medications:     ALPRAZolam (XANAX) 0.5 MG tablet, Take 1 tablet (0.5 mg total) by mouth nightly as needed., Disp: 30 tablet, Rfl: 5    amLODIPine (NORVASC) 5 MG tablet, Take 1 tablet (5 mg total) by mouth once daily., Disp: 90 tablet, Rfl: 3    atenoloL (TENORMIN) 50 MG tablet, Take 1 tablet (50 mg total) by mouth once daily., Disp: 90 tablet, Rfl: 3    busPIRone (BUSPAR) 10 MG tablet, Take 1 tablet (10 mg total) by mouth 3 (three) times daily as needed (anxiety)., Disp: 90 tablet, Rfl: 11    citalopram (CELEXA) 40 MG tablet, Take 1 tablet (40 mg total) by mouth once daily., Disp: 90 tablet, Rfl: 3    cyanocobalamin (VITAMIN B-12) 1000 MCG tablet, Take 1,000 mcg by mouth once daily., Disp: , Rfl:     fluticasone propionate (FLONASE) 50 mcg/actuation nasal spray, 2 sprays (100 mcg total) by Each Nostril route once daily., Disp: 16 g, Rfl: 11    folic acid/multivit-min/lutein (CENTRUM SILVER ORAL), Take 1 tablet by mouth once daily., Disp: , Rfl:     pravastatin (PRAVACHOL) 20 MG tablet, Take 1 tablet (20 mg total) by mouth once daily., Disp: 90 tablet, Rfl: 3    aspirin (ECOTRIN) 81 MG EC tablet, Take 1 tablet (81 mg total) by mouth once daily., Disp: 30 tablet, Rfl: 11    chlorhexidine (PERIDEX) 0.12 %  solution, place 15 milliliters in the mouth by mucous membrane route 2 times per day after brushing teeth, swish in mouth for 30 seconds then spit out, Disp: , Rfl:     HYDROcodone-acetaminophen (NORCO) 7.5-325 mg per tablet, Take 1 tablet by mouth every 6 (six) hours as needed for Pain., Disp: , Rfl:     hydrOXYzine pamoate (VISTARIL) 50 MG Cap, TAKE 1 CAPSULE EVERY 8 (EIGHT) HOURS AS NEEDED. (Patient not taking: Reported on 7/24/2023), Disp: 120 capsule, Rfl: 2    pantoprazole (PROTONIX) 40 MG tablet, Take 1 tablet (40 mg total) by mouth once daily., Disp: 30 tablet, Rfl: 1  Past Medical History:   Diagnosis Date    Anxiety and depression     CKD (chronic kidney disease)     DJD (degenerative joint disease)     bilateral knees   Hyalgan series completed 8/27/14    Hyperlipidemia     Hypertension     Insomnia      Past Surgical History:   Procedure Laterality Date    CATARACT EXTRACTION W/  INTRAOCULAR LENS IMPLANT      left    EYE SURGERY      LIPOSUCTION      RADIOFREQUENCY ABLATION Right 03/04/2022    Procedure: Radiofrequency Ablation;  Surgeon: Deon Ragland MD;  Location: [facility];  Service: Orthopedics;  Laterality: Right;  KNEE-Cooled    TONSILLECTOMY      TOTAL KNEE ARTHROPLASTY       Social History     Socioeconomic History    Marital status:     Number of children: 1   Occupational History    Occupation: Retired   Tobacco Use    Smoking status: Never    Smokeless tobacco: Never   Substance and Sexual Activity    Alcohol use: Yes     Alcohol/week: 0.0 standard drinks of alcohol     Comment: social    Drug use: Yes     Types: Benzodiazepines             Past/Current Medical/Surgical History, Past/Current Social History, Past/Current Family History and Past/Current Medications were reviewed in detail.        Objective:           VITAL SIGNS WERE REVIEWED      GENERAL APPEARANCE:     The patient looks comfortable, very anxious.     BMI 29.32 KG     No signs of respiratory distress.    Normal  breathing pattern.    No dysmorphic features    Normal eye contact.     GENERAL MEDICAL EXAM:    HEENT:  Head is atraumatic normocephalic.     No tender temporal arteries. Fundoscopic (Ophthalmoscopic) exam showed no disc edema.      Neck and Axillae: No JVD. No visible lesions.    No carotid bruits. No thyromegaly. No lymphadenopathy.    Cardiopulmonary: No cyanosis. No tachypnea. Normal respiratory effort.    Gastrointestinal/Urogenital:  No jaundice. No stomas or lesions. No visible hernias. No catheters.     Abdomen is soft non-tender. No masses or organomegaly.    Skin, Hair and Nails: No pathognonomic skin rash. No neurofibromatosis. No visible lesions.No stigmata of autoimmune disease. No clubbing.    Skin is warm and moist. No palpable masses.    Limbs: No varicose veins. No visible swelling.    No palpable edema. Pulses are symmetric. Pedal pulses are palpable.      Muskoskeletal: No visible deformities.No visible lesions.    No spine tenderness. No signs of longstanding neuropathy. No dislocations or fractures.            Neurologic Exam     Mental Status   Oriented to person, place, and time.   Registration: recalls 3 of 3 objects. Recall at 5 minutes: recalls 3 of 3 objects. Follows 3 step commands.   Attention: normal. Concentration: normal.   Speech: speech is normal and not slurred   Level of consciousness: alert  Knowledge: good and consistent with education. Able to perform simple calculations.   Able to name object. Able to read. Able to repeat. Able to write. Normal comprehension.     Cranial Nerves     CN II   Visual fields full to confrontation.   Visual acuity: normal with correction  Right visual field deficit: none  Left visual field deficit: none     CN III, IV, VI   Pupils are equal, round, and reactive to light.  Extraocular motions are normal.   Right pupil: Size: 3 mm. Shape: regular. Reactivity: brisk. Consensual response: intact. Accommodation: intact.   Left pupil: Size: 3 mm. Shape:  regular. Reactivity: brisk. Consensual response: intact. Accommodation: intact.   CN III: no CN III palsy  CN VI: no CN VI palsy  Nystagmus: none   Diplopia: none  Ophthalmoparesis: none  Upgaze: normal  Downgaze: normal  Conjugate gaze: present  Vestibulo-ocular reflex: present    CN V   Facial sensation intact.   Right facial sensation deficit: none  Left facial sensation deficit: none  Right corneal reflex: normal  Left corneal reflex: normal    CN VII   Right facial weakness: none  Left facial weakness: none  Right taste: normal  Left taste: normal    CN VIII   CN VIII normal.   Hearing: intact    CN IX, X   CN IX normal.   CN X normal.   Palate: symmetric  Right gag reflex: normal  Left gag reflex: normal    CN XI   CN XI normal.   Right sternocleidomastoid strength: normal  Left sternocleidomastoid strength: normal  Right trapezius strength: normal  Left trapezius strength: normal    CN XII   CN XII normal.   Tongue: not atrophic  Fasciculations: absent  Tongue deviation: none    Motor Exam   Muscle bulk: normal  Overall muscle tone: normal  Right arm tone: normal  Left arm tone: normal  Right arm pronator drift: absent  Left arm pronator drift: absent  Right leg tone: normal  Left leg tone: normal    Strength   Strength 5/5 throughout.   Right neck flexion: 5/5  Left neck flexion: 5/5  Right neck extension: 5/5  Left neck extension: 5/5  Right deltoid: 5/5  Left deltoid: 5/5  Right biceps: 5/5  Left biceps: 5/5  Right triceps: 5/5  Left triceps: 5/5  Right wrist flexion: 5/5  Left wrist flexion: 5/5  Right wrist extension: 5/5  Left wrist extension: 5/5  Right interossei: 5/5  Left interossei: 5/5  Right iliopsoas: 5/5  Left iliopsoas: 5/5  Right quadriceps: 5/5  Left quadriceps: 5/5  Right hamstrin/5  Left hamstrin/5  Right glutei: 5/5  Left glutei: 5/5  Right anterior tibial: 5/5  Left anterior tibial: 5/5  Right posterior tibial: 5/5  Left posterior tibial: 5/5  Right peroneal: 5/5  Left peroneal:  5/5  Right gastroc: 5/5  Left gastroc: 5/5    Sensory Exam   Light touch normal.   Right arm light touch: normal  Left arm light touch: normal  Right leg light touch: normal  Left leg light touch: normal  Vibration normal.   Right arm vibration: normal  Left arm vibration: normal  Right leg vibration: normal  Left leg vibration: normal  Proprioception normal.   Right arm proprioception: normal  Left arm proprioception: normal  Right leg proprioception: normal  Left leg proprioception: normal  Pinprick normal.   Right arm pinprick: normal  Left arm pinprick: normal  Right leg pinprick: normal  Left leg pinprick: normal  Sensory deficit distribution on left: lateral cutaneous thigh  Graphesthesia: normal  Stereognosis: normal    Gait, Coordination, and Reflexes     Gait  Gait: normal    Coordination   Romberg: negative  Finger to nose coordination: normal  Heel to shin coordination: normal  Tandem walking coordination: normal    Tremor   Resting tremor: absent  Intention tremor: absent  Action tremor: left arm and right arm    Reflexes   Right brachioradialis: 2+  Left brachioradialis: 2+  Right biceps: 2+  Left biceps: 2+  Right triceps: 2+  Left triceps: 2+  Right patellar: 2+  Left patellar: 2+  Right achilles: 2+  Left achilles: 2+  Right : 2+  Left : 2+  Right plantar: normal  Left plantar: normal  Right Jacobsen: absent  Left Jacobsen: absent  Right ankle clonus: absent  Left ankle clonus: absent  Right pendular knee jerk: absent  Left pendular knee jerk: absent          ALETHA COGNITIVE ASSESSMENT (MOCA) TOTAL SCORE          NORMAL-MILD NCD 26-30    MILD DEMENTIA 20-25    MODERATE DEMENTIA 10-19    SEVERE DEMENTIA <10    4 year degree     DATE 07-       TOTAL SCORE 28/30       VISUOSPATIAL EXECUTIVE (5) 4       NAMING (3) 3       ATTENTION (6) 6       LANGUAGE (3) 3       ABSTRACTION(2) 2       RECALL (5) 4       ORIENTATION (6) 6          Lab Results   Component Value Date    WBC 4.27 10/03/2022     HGB 12.5 10/03/2022    HCT 41.2 10/03/2022    MCV 81 (L) 10/03/2022     10/03/2022     Sodium   Date Value Ref Range Status   04/14/2023 142 136 - 145 mmol/L Final     Potassium   Date Value Ref Range Status   04/14/2023 4.0 3.5 - 5.1 mmol/L Final     Chloride   Date Value Ref Range Status   04/14/2023 106 95 - 110 mmol/L Final     CO2   Date Value Ref Range Status   04/14/2023 26 23 - 29 mmol/L Final     Glucose   Date Value Ref Range Status   04/14/2023 85 70 - 110 mg/dL Final     BUN   Date Value Ref Range Status   04/14/2023 21 8 - 23 mg/dL Final     Creatinine   Date Value Ref Range Status   04/14/2023 1.5 (H) 0.5 - 1.4 mg/dL Final     Calcium   Date Value Ref Range Status   04/14/2023 9.2 8.7 - 10.5 mg/dL Final     Total Protein   Date Value Ref Range Status   10/03/2022 7.7 6.0 - 8.4 g/dL Final     Albumin   Date Value Ref Range Status   10/03/2022 3.8 3.5 - 5.2 g/dL Final     Total Bilirubin   Date Value Ref Range Status   10/03/2022 0.4 0.1 - 1.0 mg/dL Final     Comment:     For infants and newborns, interpretation of results should be based  on gestational age, weight and in agreement with clinical  observations.    Premature Infant recommended reference ranges:  Up to 24 hours.............<8.0 mg/dL  Up to 48 hours............<12.0 mg/dL  3-5 days..................<15.0 mg/dL  6-29 days.................<15.0 mg/dL       Alkaline Phosphatase   Date Value Ref Range Status   10/03/2022 61 55 - 135 U/L Final     AST   Date Value Ref Range Status   10/03/2022 12 10 - 40 U/L Final     ALT   Date Value Ref Range Status   10/03/2022 7 (L) 10 - 44 U/L Final     Anion Gap   Date Value Ref Range Status   04/14/2023 10 8 - 16 mmol/L Final     eGFR if    Date Value Ref Range Status   03/11/2022 51.8 (A) >60 mL/min/1.73 m^2 Final     eGFR    Date Value Ref Range Status   07/12/2022 45 mL/min/1.73mSq Final     Comment:     In accordance with NKF-ASN Task Force recommendation,  calculation based on the Chronic Kidney Disease Epidemiology Collaboration (CKD-EPI) equation without adjustment for race. eGFR adjusted for gender and age and calculated in ml/min/1.73mSquared. eGFR cannot be calculated if patient is under 18 years of age.     Reference Range:   >= 60 ml/min/1.73mSquared.     eGFR if non    Date Value Ref Range Status   03/11/2022 44.9 (A) >60 mL/min/1.73 m^2 Final     Comment:     Calculation used to obtain the estimated glomerular filtration  rate (eGFR) is the CKD-EPI equation.        Lab Results   Component Value Date    NLCCILKC86 291 07/24/2023     Lab Results   Component Value Date    TSH 1.744 07/24/2023    FREET4 0.97 12/28/2020     MRI Brain WO     11-    No acute intracranial infarct.   Moderate degree of chronic microvascular ischemic changes.    LABS REVIEWED:    07-    RPR neg, STEVIE neg, TSH 1.744, HIV neg, Vitamin B12 291 NL, FA 6.2 NL     LAB NL, Recommend Vitamin B 12 replacement daily recommend level greater 450 is recommended.   Reviewed the neuroimaging independently       Assessment:       1. Cognitive complaints with normal exam    2. Word finding difficulty    3. Anxiety    4. Stress due to marital problems    5. Psychosocial stressors    6. Memory loss    7. Depression, unspecified depression type    8. Other amnesia    9. History of stroke    10. Coronary artery disease, unspecified vessel or lesion type, unspecified whether angina present, unspecified whether native or transplanted heart        Plan:         COGNITIVE COMPLAINTS WITH NORMAL EXAM/ CALVIN/ WORD FINDING DIFFICULTY/ FORGETFUL/ STRESS DUE TO MARTIAL PROBLEMS/ PSYCHOSOCIAL STRESSORS/HISTORY OF STROKE      EVALUATION     Brain MRI WO     TSH-T4, FA, B12, HIV, RPR.    Comprehensive Neuropsychological Testing     Explained to the patient and family that medications are intended to slow down the progression and not stop it or reverse the disease.The disease is relentless,  progressive and so far cannot be controlled.     I counseled the patient and family that the rate of progression is extremely variable and the average (10 years) is an inaccurate measure.     NO DMA recommended at this time    Recommend optimization of MDD/CALVIN    Refer Psychiatry and psychologist urgent     HOME CARE AND IN FACILITY CARE         Falling Down Precautions. Gait is affected by the disease as well.     Avoid driving and access to firearms     Incremental 24/Care     Help with finances and decision making.    Join support group.    Proofing the house and use labeling.    Avoid antihistamines and anticholinergics.    Avoid changing routine.    Use written reminders.    Avoid multitasking.    Healthy diet, exercise (physical and cognitive).    Good sleep hygiene.        HERE ARE 10 WAYS TO REDUCE RISK OF DEMENTIA AND SLOW DOWN THE PROGRESSION OF DEMENTIA        1.Be physically active.    2. Avoid smoking and alcohol consumption.    3. Track your numbers. Keep your blood pressure, cholesterol, blood sugar and weight within recommended ranges.    4. Stay socially connected.    5. Make healthy food choices. Eat a well-balance and healthy diet that rich in cereals, fish, legumes, and vegetables.    6. Reduce stress.     7. Challenge your brain by trying something new, playing games, or learning a new language.    8. Take care of your hearing. Avoid being continuously exposed to loud sounds and wear a hearing aid if hearing does become a problem.    9. Lower risk of falls. Consider installing handrails on all stairs and grab bars in bathrooms.    10. Reduce your exposure to air pollution, such as exposure to exhaust in heavy traffic.         SLEEP HYGIENE     Poor sleep has a negative effect on cognition. Several strategies have been shown to improve sleep:     Caffeine intake in the afternoon and evening, as well as stuffing oneself at supper, can decrease the quality of restful sleep throughout the night.    Bedtime and wake-up times should be consistent every night and morning so the body becomes used to a single routine, even on the weekends.  Engage in daily physical activity, but not 2-3 hours before bedtime.   No technology use (television, computer, iPad) 1-2 hours before bed.   Have a wind down routine (e.g., soft lights in the house, bath before bed, reduced fluid intake, songs, reading, less noise) to promote sleep readiness.   Visit the www.sleepfoundation.org for more strategies.      COGNITIVE HYGIENE     Engage in regular exercise, which increases alertness and arousal and can improve attention and focus.  Consider lower impact exercises, such as yoga or light walking.  Get a good nights sleep, as this can enhance alertness and cognition.  Eat healthy foods and balanced meals. It is notable that research indicates certain nutrients may aid in brain function, such as B vitamins (especially B6, B12, and folic acid), antioxidants (such as vitamins C and E, and beta carotene), and Omega-3 fatty acids. Talk with your physician or nutritionist about whats right for you.   Keep your brain active. Find activities to stay mentally active, such as reading, games (cards, checkers), puzzles (crosswords, Sudoku, jig saw), crafts (models, woodworking), gardening, or participating in activities in the community.  Stay socially engaged. Continue staying active with your family and friends.        HISTORY OF STROKES (RISK FACTORS CAD/HLD/HTN)      Cardiac referral for stroke work up     No indication for surgical intervention      ASA 81 mg QD      Vascular Risk Factors (VRFs) stratification (BP, BS, BC control and Smoking Cessation) is the mainstay of stroke prevention (>90%). The benefit of antiplatelets is < 20% stroke risk reduction.         Healthy diet and exercise    Avoid driving.    Call 911 if any SUDDEN:   Weakness  Numbness  Slurring of speech  Speech difficulty   Vertigo  Loss of balance  Loss of  vision  Loss of hearing  Double vision  Trouble swallowing  Trouble breathing  Facial drooping      MEDICAL/SURGICAL COMORBIDITIES     All relevant medical comorbidities noted and managed by primary care physician and medical care team.          MISCELLANEOUS MEDICAL PROBLEMS       HEALTHY LIFESTYLE AND PREVENTATIVE CARE    Encouraged the patient to adhere to the age-appropriate health maintenance guidelines including screening tests and vaccinations.     Discussed the overall importance of healthy lifestyle, optimal weight, exercise, healthy diet, good sleep hygiene and avoiding drugs including smoking, alcohol and recreational drugs. The patient verbalized full understanding.       Advised the patient to follow COVID-19 prevention measures.       I spent 130 minutes face to face with the patient    Time spent in counseling and coordination of care including discussions etiology of diagnosis, pathogenesis of diagnosis, prognosis of diagnosis,, diagnostic results, impression and recommendations, diagnostic studies, management, risks and benefits of treatment, instructions of disease self-management, treatment instructions, follow up requirements, patient and family counseling/involvement in care compliance with treatment regimen. All of the patient's questions were answered during this discussion.

## 2023-07-25 LAB
ANA SER QL IF: NORMAL
FOLATE SERPL-MCNC: 6.2 NG/ML (ref 4–24)
HCYS SERPL-SCNC: 12.9 UMOL/L (ref 4–15.5)
HIV 1+2 AB+HIV1 P24 AG SERPL QL IA: NORMAL
RPR SER QL: NORMAL
TSH SERPL DL<=0.005 MIU/L-ACNC: 1.74 UIU/ML (ref 0.4–4)
VIT B12 SERPL-MCNC: 291 PG/ML (ref 210–950)

## 2023-07-26 NOTE — PROGRESS NOTES
LABS REVIEWED:    07-    RPR neg, STEVIE neg, TSH 1.744, HIV neg, Vitamin B12 291 NL, FA 6.2 NL     LAB NL, Recommend Vitamin B 12 replacement daily recommend level greater 450 is recommended.

## 2023-07-27 ENCOUNTER — PATIENT MESSAGE (OUTPATIENT)
Dept: NEUROLOGY | Facility: CLINIC | Age: 73
End: 2023-07-27
Payer: MEDICARE

## 2023-07-27 ENCOUNTER — TELEPHONE (OUTPATIENT)
Dept: NEUROLOGY | Facility: CLINIC | Age: 73
End: 2023-07-27
Payer: MEDICARE

## 2023-07-27 ENCOUNTER — TELEPHONE (OUTPATIENT)
Dept: PSYCHIATRY | Facility: CLINIC | Age: 73
End: 2023-07-27
Payer: MEDICARE

## 2023-07-27 NOTE — TELEPHONE ENCOUNTER
[facility] - Neurology  Medical Specialty       Patient Name: Tali Fitzpatrick  MRN: [id number]  Primary Care Physician: Yobani Muniz MD    LVM for pt in attempt to discuss SW referral.      Jagjit Hdez MSW, LCSW-BACS  [phone]

## 2023-07-28 NOTE — TELEPHONE ENCOUNTER
LOV:07/27/2023    Patient is requesting for Trazodone to be refilled. I looked through patient chart and I didn't see where she had a refill on file. Do you recall if patient is currently taking the medication or if she was going to be starting it?.

## 2023-07-31 ENCOUNTER — TELEPHONE (OUTPATIENT)
Dept: NEUROLOGY | Facility: CLINIC | Age: 73
End: 2023-07-31
Payer: MEDICARE

## 2023-07-31 NOTE — TELEPHONE ENCOUNTER
[facility] - Neurology  Medical Specialty       Patient Name: Tali Fitzpatrick  MRN: [id number]  Primary Care Physician: Yobani Muniz MD    Received call back from pt. She reports issues with her  not being able to appropriately handle finances due to his age and dementia diagnosis. This is causing a lot of stress in her life. Asked if they have children or any support. She reports they have a daughter who has been trying to help. Pt also reports she is starting to see a therapist and psychiatrist to also help cope with everything. Provided support and resources for legal assistance to help with POA and financial issues. Pt repots she wanted to avoid getting a , but knows that is what's coming since her  is so unreasonable and doesn't seem to understand what he is doing. Encouraged pt to call us with any other needs/concerns that may arise.      AMY Villegas, [facility]-BACS  [phone]

## 2023-08-11 ENCOUNTER — HOSPITAL ENCOUNTER (OUTPATIENT)
Dept: RADIOLOGY | Facility: HOSPITAL | Age: 73
Discharge: HOME | End: 2023-08-11
Attending: NURSE PRACTITIONER
Payer: MEDICARE

## 2023-08-11 DIAGNOSIS — R41.3 OTHER AMNESIA: ICD-10-CM

## 2023-08-11 DIAGNOSIS — Z65.8 PSYCHOSOCIAL STRESSORS: ICD-10-CM

## 2023-08-11 DIAGNOSIS — R41.3 MEMORY LOSS: ICD-10-CM

## 2023-08-11 DIAGNOSIS — R41.9 COGNITIVE COMPLAINTS WITH NORMAL EXAM: ICD-10-CM

## 2023-08-11 DIAGNOSIS — F41.9 ANXIETY: ICD-10-CM

## 2023-08-11 DIAGNOSIS — F32.A DEPRESSION, UNSPECIFIED DEPRESSION TYPE: ICD-10-CM

## 2023-08-11 PROCEDURE — 70551 MRI BRAIN STEM W/O DYE: CPT | Mod: TC,HCNC

## 2023-08-11 PROCEDURE — 70551 MRI BRAIN WITHOUT CONTRAST: ICD-10-PCS | Mod: 26,HCNC,, | Performed by: RADIOLOGY

## 2023-08-11 PROCEDURE — 70551 MRI BRAIN STEM W/O DYE: CPT | Mod: 26,HCNC,, | Performed by: RADIOLOGY

## 2023-08-15 PROBLEM — I25.10 CORONARY ARTERY DISEASE: Status: ACTIVE | Noted: 2023-08-15

## 2023-08-15 PROBLEM — Z86.73 HISTORY OF STROKE: Status: ACTIVE | Noted: 2023-08-15

## 2023-08-15 RX ORDER — ASPIRIN 81 MG/1
81 TABLET ORAL DAILY
Qty: 30 TABLET | Refills: 11 | Status: SHIPPED | OUTPATIENT
Start: 2023-08-15 | End: 2024-08-14

## 2023-08-15 NOTE — PROGRESS NOTES
MRI Brain WO:    08-       No acute intracranial abnormality.     Tiny remote infarcts of the right basal ganglia and right cerebellum.     Mild to moderate chronic microvascular ischemic changes    Summary no acute findings but does show old tiny strokes, recommend stroke prevention measures daily ASA, management of B/P and HLD with PCP. Also recommend evaluation from Cardiology  related to old unknown stroke findings

## 2023-08-25 ENCOUNTER — OFFICE VISIT (OUTPATIENT)
Dept: CARDIOLOGY | Facility: CLINIC | Age: 73
End: 2023-08-25
Payer: MEDICARE

## 2023-08-25 VITALS
RESPIRATION RATE: 16 BRPM | HEIGHT: 67 IN | WEIGHT: 188.25 LBS | SYSTOLIC BLOOD PRESSURE: 138 MMHG | BODY MASS INDEX: 29.55 KG/M2 | HEART RATE: 75 BPM | OXYGEN SATURATION: 97 % | DIASTOLIC BLOOD PRESSURE: 86 MMHG

## 2023-08-25 DIAGNOSIS — E78.00 PURE HYPERCHOLESTEROLEMIA: ICD-10-CM

## 2023-08-25 DIAGNOSIS — I25.10 CORONARY ARTERY DISEASE, UNSPECIFIED VESSEL OR LESION TYPE, UNSPECIFIED WHETHER ANGINA PRESENT, UNSPECIFIED WHETHER NATIVE OR TRANSPLANTED HEART: ICD-10-CM

## 2023-08-25 DIAGNOSIS — N18.32 STAGE 3B CHRONIC KIDNEY DISEASE: ICD-10-CM

## 2023-08-25 DIAGNOSIS — Z86.73 HISTORY OF STROKE: ICD-10-CM

## 2023-08-25 DIAGNOSIS — I10 PRIMARY HYPERTENSION: Primary | ICD-10-CM

## 2023-08-25 PROCEDURE — 1160F RVW MEDS BY RX/DR IN RCRD: CPT | Mod: HCNC,CPTII,S$GLB, | Performed by: INTERNAL MEDICINE

## 2023-08-25 PROCEDURE — 3079F DIAST BP 80-89 MM HG: CPT | Mod: HCNC,CPTII,S$GLB, | Performed by: INTERNAL MEDICINE

## 2023-08-25 PROCEDURE — 99205 PR OFFICE/OUTPT VISIT, NEW, LEVL V, 60-74 MIN: ICD-10-PCS | Mod: HCNC,S$GLB,, | Performed by: INTERNAL MEDICINE

## 2023-08-25 PROCEDURE — 1159F PR MEDICATION LIST DOCUMENTED IN MEDICAL RECORD: ICD-10-PCS | Mod: HCNC,CPTII,S$GLB, | Performed by: INTERNAL MEDICINE

## 2023-08-25 PROCEDURE — 3075F SYST BP GE 130 - 139MM HG: CPT | Mod: HCNC,CPTII,S$GLB, | Performed by: INTERNAL MEDICINE

## 2023-08-25 PROCEDURE — 3044F PR MOST RECENT HEMOGLOBIN A1C LEVEL <7.0%: ICD-10-PCS | Mod: HCNC,CPTII,S$GLB, | Performed by: INTERNAL MEDICINE

## 2023-08-25 PROCEDURE — 99999 PR PBB SHADOW E&M-EST. PATIENT-LVL V: ICD-10-PCS | Mod: PBBFAC,HCNC,, | Performed by: INTERNAL MEDICINE

## 2023-08-25 PROCEDURE — 99999 PR PBB SHADOW E&M-EST. PATIENT-LVL V: CPT | Mod: PBBFAC,HCNC,, | Performed by: INTERNAL MEDICINE

## 2023-08-25 PROCEDURE — 3079F PR MOST RECENT DIASTOLIC BLOOD PRESSURE 80-89 MM HG: ICD-10-PCS | Mod: HCNC,CPTII,S$GLB, | Performed by: INTERNAL MEDICINE

## 2023-08-25 PROCEDURE — 1159F MED LIST DOCD IN RCRD: CPT | Mod: HCNC,CPTII,S$GLB, | Performed by: INTERNAL MEDICINE

## 2023-08-25 PROCEDURE — 3044F HG A1C LEVEL LT 7.0%: CPT | Mod: HCNC,CPTII,S$GLB, | Performed by: INTERNAL MEDICINE

## 2023-08-25 PROCEDURE — 99205 OFFICE O/P NEW HI 60 MIN: CPT | Mod: HCNC,S$GLB,, | Performed by: INTERNAL MEDICINE

## 2023-08-25 PROCEDURE — 3075F PR MOST RECENT SYSTOLIC BLOOD PRESS GE 130-139MM HG: ICD-10-PCS | Mod: HCNC,CPTII,S$GLB, | Performed by: INTERNAL MEDICINE

## 2023-08-25 PROCEDURE — 1160F PR REVIEW ALL MEDS BY PRESCRIBER/CLIN PHARMACIST DOCUMENTED: ICD-10-PCS | Mod: HCNC,CPTII,S$GLB, | Performed by: INTERNAL MEDICINE

## 2023-08-25 NOTE — PROGRESS NOTES
Subjective:   Patient ID:  Tali Fitzpatrick is a 73 y.o. female who presents for follow-up of coronary heart disease  Pt referred by neurology with probable hx of CVA by MRI. Neurology sx - memory issues  Patient denies CP, angina or anginal equivalent.       Hypertension  This is a chronic problem. The current episode started more than 1 year ago. The problem has been gradually improving since onset. The problem is controlled. Pertinent negatives include no chest pain, palpitations or shortness of breath. Past treatments include calcium channel blockers and beta blockers. The current treatment provides moderate improvement. There are no compliance problems.    Hyperlipidemia  This is a chronic problem. The current episode started more than 1 year ago. The problem is controlled. Pertinent negatives include no chest pain or shortness of breath. Current antihyperlipidemic treatment includes statins. The current treatment provides moderate improvement of lipids. There are no compliance problems.  Risk factors for coronary artery disease include hypertension, dyslipidemia and post-menopausal.       Review of Systems   Constitutional: Negative. Negative for weight gain.   HENT: Negative.     Eyes: Negative.    Cardiovascular: Negative.  Negative for chest pain, leg swelling and palpitations.   Respiratory: Negative.  Negative for shortness of breath.    Endocrine: Negative.    Hematologic/Lymphatic: Negative.    Skin: Negative.    Musculoskeletal:  Negative for muscle weakness.   Gastrointestinal: Negative.    Genitourinary: Negative.    Neurological: Negative.  Negative for dizziness.   Psychiatric/Behavioral: Negative.     Allergic/Immunologic: Negative.    All other systems reviewed and are negative.    Family History   Problem Relation Age of Onset    Arthritis Mother     Coronary artery disease Mother     Coronary artery disease Father     Colon cancer Neg Hx     Breast cancer Neg Hx     Diabetes Neg Hx      Past  Medical History:   Diagnosis Date    Anxiety and depression     CKD (chronic kidney disease)     DJD (degenerative joint disease)     bilateral knees   Hyalgan series completed 8/27/14    Hyperlipidemia     Hypertension     Insomnia      Social History     Socioeconomic History    Marital status:     Number of children: 1   Occupational History    Occupation: Retired   Tobacco Use    Smoking status: Never    Smokeless tobacco: Never   Substance and Sexual Activity    Alcohol use: Yes     Alcohol/week: 0.0 standard drinks of alcohol     Comment: social    Drug use: Yes     Types: Benzodiazepines     Current Outpatient Medications on File Prior to Visit   Medication Sig Dispense Refill    ALPRAZolam (XANAX) 0.5 MG tablet Take 1 tablet (0.5 mg total) by mouth nightly as needed. 30 tablet 5    amLODIPine (NORVASC) 5 MG tablet Take 1 tablet (5 mg total) by mouth once daily. 90 tablet 3    aspirin (ECOTRIN) 81 MG EC tablet Take 1 tablet (81 mg total) by mouth once daily. 30 tablet 11    atenoloL (TENORMIN) 50 MG tablet Take 1 tablet (50 mg total) by mouth once daily. 90 tablet 3    busPIRone (BUSPAR) 10 MG tablet Take 1 tablet (10 mg total) by mouth 3 (three) times daily as needed (anxiety). 90 tablet 11    chlorhexidine (PERIDEX) 0.12 % solution place 15 milliliters in the mouth by mucous membrane route 2 times per day after brushing teeth, swish in mouth for 30 seconds then spit out      citalopram (CELEXA) 40 MG tablet Take 1 tablet (40 mg total) by mouth once daily. 90 tablet 3    cyanocobalamin (VITAMIN B-12) 1000 MCG tablet Take 1,000 mcg by mouth once daily.      fluticasone propionate (FLONASE) 50 mcg/actuation nasal spray 2 sprays (100 mcg total) by Each Nostril route once daily. 16 g 11    folic acid/multivit-min/lutein (CENTRUM SILVER ORAL) Take 1 tablet by mouth once daily.      HYDROcodone-acetaminophen (NORCO) 7.5-325 mg per tablet Take 1 tablet by mouth every 6 (six) hours as needed for Pain.       hydrOXYzine pamoate (VISTARIL) 50 MG Cap TAKE 1 CAPSULE EVERY 8 (EIGHT) HOURS AS NEEDED. 120 capsule 2    pravastatin (PRAVACHOL) 20 MG tablet Take 1 tablet (20 mg total) by mouth once daily. 90 tablet 3    pantoprazole (PROTONIX) 40 MG tablet Take 1 tablet (40 mg total) by mouth once daily. 30 tablet 1     No current facility-administered medications on file prior to visit.     Review of patient's allergies indicates:   Allergen Reactions    Micardis [telmisartan]      Can't remember       Objective:     Physical Exam  Vitals and nursing note reviewed.   Constitutional:       Appearance: She is well-developed.   HENT:      Head: Normocephalic and atraumatic.   Eyes:      Conjunctiva/sclera: Conjunctivae normal.      Pupils: Pupils are equal, round, and reactive to light.   Cardiovascular:      Rate and Rhythm: Normal rate and regular rhythm.      Pulses: Intact distal pulses.      Heart sounds: Normal heart sounds.   Pulmonary:      Effort: Pulmonary effort is normal.      Breath sounds: Normal breath sounds.   Abdominal:      General: Bowel sounds are normal.      Palpations: Abdomen is soft.   Musculoskeletal:         General: Normal range of motion.      Cervical back: Normal range of motion and neck supple.   Skin:     General: Skin is warm and dry.   Neurological:      Mental Status: She is alert and oriented to person, place, and time.         Assessment:     1. Primary hypertension    2. History of stroke    3. Coronary artery disease, unspecified vessel or lesion type, unspecified whether angina present, unspecified whether native or transplanted heart    4. Pure hypercholesterolemia    5. Stage 3b chronic kidney disease        Plan:     Primary hypertension    History of stroke  -     Ambulatory referral/consult to Cardiology    Coronary artery disease, unspecified vessel or lesion type, unspecified whether angina present, unspecified whether native or transplanted heart  -     Ambulatory referral/consult  to Cardiology    Pure hypercholesterolemia    Stage 3b chronic kidney disease      Continue norvasc, atenolol-HTN  Continue statin-HLP  Continue asa- CVA

## 2023-09-08 ENCOUNTER — HOSPITAL ENCOUNTER (OUTPATIENT)
Dept: CARDIOLOGY | Facility: HOSPITAL | Age: 73
Discharge: HOME | End: 2023-09-08
Attending: INTERNAL MEDICINE
Payer: MEDICARE

## 2023-09-08 VITALS
SYSTOLIC BLOOD PRESSURE: 138 MMHG | BODY MASS INDEX: 29.51 KG/M2 | WEIGHT: 188 LBS | DIASTOLIC BLOOD PRESSURE: 86 MMHG | HEIGHT: 67 IN

## 2023-09-08 DIAGNOSIS — Z86.73 HISTORY OF STROKE: ICD-10-CM

## 2023-09-08 LAB
AORTIC ROOT ANNULUS: 2.86 CM
ASCENDING AORTA: 2.7 CM
AV INDEX (PROSTH): 0.75
AV MEAN GRADIENT: 5 MMHG
AV PEAK GRADIENT: 9 MMHG
AV VALVE AREA BY VELOCITY RATIO: 1.79 CM²
AV VALVE AREA: 1.92 CM²
AV VELOCITY RATIO: 0.7
BSA FOR ECHO PROCEDURE: 2.01 M2
CV ECHO LV RWT: 0.52 CM
DOP CALC AO PEAK VEL: 1.48 M/S
DOP CALC AO VTI: 35.7 CM
DOP CALC LVOT AREA: 2.5 CM2
DOP CALC LVOT DIAMETER: 1.8 CM
DOP CALC LVOT PEAK VEL: 1.04 M/S
DOP CALC LVOT STROKE VOLUME: 68.42 CM3
DOP CALC RVOT PEAK VEL: 0.74 M/S
DOP CALC RVOT VTI: 19.1 CM
DOP CALCLVOT PEAK VEL VTI: 26.9 CM
E WAVE DECELERATION TIME: 198.09 MSEC
E/A RATIO: 0.87
E/E' RATIO: 13.08 M/S
ECHO LV POSTERIOR WALL: 1.17 CM (ref 0.6–1.1)
FRACTIONAL SHORTENING: 36 % (ref 28–44)
INTERVENTRICULAR SEPTUM: 1.41 CM (ref 0.6–1.1)
IVC DIAMETER: 1.47 CM
IVRT: 108.47 MSEC
LA MAJOR: 5.77 CM
LA MINOR: 6.18 CM
LA WIDTH: 4 CM
LEFT ATRIUM SIZE: 4.06 CM
LEFT ATRIUM VOLUME INDEX MOD: 34.9 ML/M2
LEFT ATRIUM VOLUME INDEX: 41.8 ML/M2
LEFT ATRIUM VOLUME MOD: 68.84 CM3
LEFT ATRIUM VOLUME: 82.38 CM3
LEFT INTERNAL DIMENSION IN SYSTOLE: 2.85 CM (ref 2.1–4)
LEFT VENTRICLE DIASTOLIC VOLUME INDEX: 46.03 ML/M2
LEFT VENTRICLE DIASTOLIC VOLUME: 90.68 ML
LEFT VENTRICLE MASS INDEX: 110 G/M2
LEFT VENTRICLE SYSTOLIC VOLUME INDEX: 15.7 ML/M2
LEFT VENTRICLE SYSTOLIC VOLUME: 30.91 ML
LEFT VENTRICULAR INTERNAL DIMENSION IN DIASTOLE: 4.46 CM (ref 3.5–6)
LEFT VENTRICULAR MASS: 217.08 G
LV LATERAL E/E' RATIO: 12.14 M/S
LV SEPTAL E/E' RATIO: 14.17 M/S
LVOT MG: 2.67 MMHG
LVOT MV: 0.78 CM/S
MV PEAK A VEL: 0.98 M/S
MV PEAK E VEL: 0.85 M/S
MV STENOSIS PRESSURE HALF TIME: 57.45 MS
MV VALVE AREA P 1/2 METHOD: 3.83 CM2
PISA TR MAX VEL: 2.71 M/S
PULM VEIN S/D RATIO: 1.67
PV MEAN GRADIENT: 1 MMHG
PV MV: 0.59 M/S
PV PEAK D VEL: 0.45 M/S
PV PEAK GRADIENT: 3 MMHG
PV PEAK S VEL: 0.75 M/S
PV PEAK VELOCITY: 0.82 M/S
RA MAJOR: 4.67 CM
RA PRESSURE ESTIMATED: 3 MMHG
RA WIDTH: 4 CM
RIGHT VENTRICULAR END-DIASTOLIC DIMENSION: 3.84 CM
RV TB RVSP: 6 MMHG
SINUS: 2.69 CM
STJ: 2.74 CM
TDI LATERAL: 0.07 M/S
TDI SEPTAL: 0.06 M/S
TDI: 0.07 M/S
TR MAX PG: 29 MMHG
TRICUSPID ANNULAR PLANE SYSTOLIC EXCURSION: 1.67 CM
TV REST PULMONARY ARTERY PRESSURE: 32 MMHG
Z-SCORE OF LEFT VENTRICULAR DIMENSION IN END DIASTOLE: -2.39
Z-SCORE OF LEFT VENTRICULAR DIMENSION IN END SYSTOLE: -1.58

## 2023-09-08 PROCEDURE — 93306 TTE W/DOPPLER COMPLETE: CPT | Mod: 26,HCNC,, | Performed by: INTERNAL MEDICINE

## 2023-09-08 PROCEDURE — 93306 ECHO (CUPID ONLY): ICD-10-PCS | Mod: 26,HCNC,, | Performed by: INTERNAL MEDICINE

## 2023-09-08 PROCEDURE — 93306 TTE W/DOPPLER COMPLETE: CPT | Mod: HCNC

## 2023-09-11 ENCOUNTER — TELEPHONE (OUTPATIENT)
Dept: CARDIOLOGY | Facility: CLINIC | Age: 73
End: 2023-09-11
Payer: MEDICARE

## 2023-09-11 NOTE — TELEPHONE ENCOUNTER
----- Message from Dwight Londono MD sent at 9/10/2023  7:37 AM CDT -----  Please tell pt:  Echo is wnl limits

## 2023-09-11 NOTE — TELEPHONE ENCOUNTER
Followed up with Tali, patient has been notified of this information and all questions answered. Per patient's provider:  Echo is wnl limits. Patient verbalized understanding.

## 2023-10-13 NOTE — TELEPHONE ENCOUNTER
No care due was identified.  [facility] Embedded Care Due Messages. Reference number: [id number].   10/13/2023 12:32:33 PM CDT

## 2023-10-16 RX ORDER — ALPRAZOLAM 0.5 MG/1
0.5 TABLET ORAL NIGHTLY PRN
Qty: 30 TABLET | Refills: 0 | Status: SHIPPED | OUTPATIENT
Start: 2023-10-16 | End: 2023-10-19 | Stop reason: SDUPTHER

## 2023-10-19 ENCOUNTER — OFFICE VISIT (OUTPATIENT)
Dept: INTERNAL MEDICINE | Facility: CLINIC | Age: 73
End: 2023-10-19
Payer: MEDICARE

## 2023-10-19 VITALS
SYSTOLIC BLOOD PRESSURE: 136 MMHG | TEMPERATURE: 98 F | DIASTOLIC BLOOD PRESSURE: 84 MMHG | BODY MASS INDEX: 30.11 KG/M2 | WEIGHT: 192.25 LBS | HEART RATE: 78 BPM | OXYGEN SATURATION: 98 %

## 2023-10-19 DIAGNOSIS — N18.32 STAGE 3B CHRONIC KIDNEY DISEASE: ICD-10-CM

## 2023-10-19 DIAGNOSIS — E78.5 DYSLIPIDEMIA: ICD-10-CM

## 2023-10-19 DIAGNOSIS — I10 HYPERTENSION, UNSPECIFIED TYPE: ICD-10-CM

## 2023-10-19 DIAGNOSIS — R73.03 PREDIABETES: ICD-10-CM

## 2023-10-19 DIAGNOSIS — Z00.00 ANNUAL PHYSICAL EXAM: Primary | ICD-10-CM

## 2023-10-19 PROCEDURE — G0008 ADMIN INFLUENZA VIRUS VAC: HCPCS | Mod: HCNC,S$GLB,, | Performed by: FAMILY MEDICINE

## 2023-10-19 PROCEDURE — G0008 FLU VACCINE - QUADRIVALENT - ADJUVANTED: ICD-10-PCS | Mod: HCNC,S$GLB,, | Performed by: FAMILY MEDICINE

## 2023-10-19 PROCEDURE — 99397 PR PREVENTIVE VISIT,EST,65 & OVER: ICD-10-PCS | Mod: HCNC,S$GLB,, | Performed by: FAMILY MEDICINE

## 2023-10-19 PROCEDURE — 3044F PR MOST RECENT HEMOGLOBIN A1C LEVEL <7.0%: ICD-10-PCS | Mod: HCNC,CPTII,S$GLB, | Performed by: FAMILY MEDICINE

## 2023-10-19 PROCEDURE — 3075F SYST BP GE 130 - 139MM HG: CPT | Mod: HCNC,CPTII,S$GLB, | Performed by: FAMILY MEDICINE

## 2023-10-19 PROCEDURE — 3075F PR MOST RECENT SYSTOLIC BLOOD PRESS GE 130-139MM HG: ICD-10-PCS | Mod: HCNC,CPTII,S$GLB, | Performed by: FAMILY MEDICINE

## 2023-10-19 PROCEDURE — 1160F PR REVIEW ALL MEDS BY PRESCRIBER/CLIN PHARMACIST DOCUMENTED: ICD-10-PCS | Mod: HCNC,CPTII,S$GLB, | Performed by: FAMILY MEDICINE

## 2023-10-19 PROCEDURE — 99999 PR PBB SHADOW E&M-EST. PATIENT-LVL III: ICD-10-PCS | Mod: PBBFAC,HCNC,, | Performed by: FAMILY MEDICINE

## 2023-10-19 PROCEDURE — 1160F RVW MEDS BY RX/DR IN RCRD: CPT | Mod: HCNC,CPTII,S$GLB, | Performed by: FAMILY MEDICINE

## 2023-10-19 PROCEDURE — 1101F PR PT FALLS ASSESS DOC 0-1 FALLS W/OUT INJ PAST YR: ICD-10-PCS | Mod: HCNC,CPTII,S$GLB, | Performed by: FAMILY MEDICINE

## 2023-10-19 PROCEDURE — 1159F MED LIST DOCD IN RCRD: CPT | Mod: HCNC,CPTII,S$GLB, | Performed by: FAMILY MEDICINE

## 2023-10-19 PROCEDURE — 99397 PER PM REEVAL EST PAT 65+ YR: CPT | Mod: HCNC,S$GLB,, | Performed by: FAMILY MEDICINE

## 2023-10-19 PROCEDURE — 3079F DIAST BP 80-89 MM HG: CPT | Mod: HCNC,CPTII,S$GLB, | Performed by: FAMILY MEDICINE

## 2023-10-19 PROCEDURE — 99999 PR PBB SHADOW E&M-EST. PATIENT-LVL III: CPT | Mod: PBBFAC,HCNC,, | Performed by: FAMILY MEDICINE

## 2023-10-19 PROCEDURE — 90694 FLU VACCINE - QUADRIVALENT - ADJUVANTED: ICD-10-PCS | Mod: HCNC,S$GLB,, | Performed by: FAMILY MEDICINE

## 2023-10-19 PROCEDURE — 3288F PR FALLS RISK ASSESSMENT DOCUMENTED: ICD-10-PCS | Mod: HCNC,CPTII,S$GLB, | Performed by: FAMILY MEDICINE

## 2023-10-19 PROCEDURE — 1101F PT FALLS ASSESS-DOCD LE1/YR: CPT | Mod: HCNC,CPTII,S$GLB, | Performed by: FAMILY MEDICINE

## 2023-10-19 PROCEDURE — 1159F PR MEDICATION LIST DOCUMENTED IN MEDICAL RECORD: ICD-10-PCS | Mod: HCNC,CPTII,S$GLB, | Performed by: FAMILY MEDICINE

## 2023-10-19 PROCEDURE — 3079F PR MOST RECENT DIASTOLIC BLOOD PRESSURE 80-89 MM HG: ICD-10-PCS | Mod: HCNC,CPTII,S$GLB, | Performed by: FAMILY MEDICINE

## 2023-10-19 PROCEDURE — 3044F HG A1C LEVEL LT 7.0%: CPT | Mod: HCNC,CPTII,S$GLB, | Performed by: FAMILY MEDICINE

## 2023-10-19 PROCEDURE — 3288F FALL RISK ASSESSMENT DOCD: CPT | Mod: HCNC,CPTII,S$GLB, | Performed by: FAMILY MEDICINE

## 2023-10-19 PROCEDURE — 90694 VACC AIIV4 NO PRSRV 0.5ML IM: CPT | Mod: HCNC,S$GLB,, | Performed by: FAMILY MEDICINE

## 2023-10-19 RX ORDER — ALPRAZOLAM 0.5 MG/1
0.5 TABLET ORAL NIGHTLY PRN
Qty: 30 TABLET | Refills: 5 | Status: SHIPPED | OUTPATIENT
Start: 2023-10-19 | End: 2024-02-06 | Stop reason: SDUPTHER

## 2023-10-19 NOTE — PROGRESS NOTES
Subjective:      Patient ID: Tali Fitzpatrick is a 73 y.o. female.    Chief Complaint: Annual Exam    HPI  74 yo with HTN, CALVIN, hyperlipidemia, sleep issues, CKD here for annual.  Doing ok//dealing with almost 98 yo /his dementia that is worsening.  No falls for her  Her ears are full of wax and she is asking to get them cleaned out.  No CP/SOB  Normal BMs    Past Medical History:   Diagnosis Date    Anxiety and depression     CKD (chronic kidney disease)     DJD (degenerative joint disease)     bilateral knees   Hyalgan series completed 8/27/14    Hyperlipidemia     Hypertension     Insomnia      Family History   Problem Relation Age of Onset    Arthritis Mother     Coronary artery disease Mother     Coronary artery disease Father     Colon cancer Neg Hx     Breast cancer Neg Hx     Diabetes Neg Hx      Past Surgical History:   Procedure Laterality Date    CATARACT EXTRACTION W/  INTRAOCULAR LENS IMPLANT      left    EYE SURGERY      LIPOSUCTION      RADIOFREQUENCY ABLATION Right 03/04/2022    Procedure: Radiofrequency Ablation;  Surgeon: Deon Ragland MD;  Location: [facility] OR;  Service: Orthopedics;  Laterality: Right;  KNEE-Cooled    TONSILLECTOMY      TOTAL KNEE ARTHROPLASTY       Social History     Tobacco Use    Smoking status: Never    Smokeless tobacco: Never   Substance Use Topics    Alcohol use: Yes     Alcohol/week: 0.0 standard drinks of alcohol     Comment: social    Drug use: Yes     Types: Benzodiazepines       /84   Pulse 78   Temp 97.6 °F (36.4 °C)   Wt 87.2 kg (192 lb 3.9 oz)   SpO2 98%   BMI 30.11 kg/m²     Review of Systems   Constitutional:  Negative for activity change, appetite change, chills, diaphoresis, fatigue, fever and unexpected weight change.   HENT:  Negative for ear pain, hearing loss, postnasal drip, rhinorrhea and tinnitus.    Eyes:  Negative for visual disturbance.   Respiratory:  Negative for cough, shortness of breath and wheezing.    Cardiovascular:   Negative for chest pain, palpitations and leg swelling.   Gastrointestinal:  Negative for abdominal distention.   Genitourinary:  Negative for dysuria, frequency, hematuria and urgency.   Musculoskeletal:  Negative for back pain and joint swelling.   Neurological:  Negative for weakness and headaches.   Hematological:  Negative for adenopathy.   Psychiatric/Behavioral:  Negative for confusion and decreased concentration.        Objective:     Physical Exam  Vitals and nursing note reviewed.   Constitutional:       General: She is not in acute distress.     Appearance: She is well-developed.   HENT:      Right Ear: External ear normal. There is impacted cerumen.      Left Ear: External ear normal. There is impacted cerumen.      Nose: Nose normal.   Eyes:      Conjunctiva/sclera: Conjunctivae normal.      Pupils: Pupils are equal, round, and reactive to light.   Neck:      Thyroid: No thyromegaly.   Cardiovascular:      Rate and Rhythm: Normal rate and regular rhythm.      Heart sounds: Normal heart sounds.   Pulmonary:      Effort: Pulmonary effort is normal. No respiratory distress.      Breath sounds: Normal breath sounds. No wheezing or rales.   Abdominal:      General: Bowel sounds are normal. There is no distension.      Palpations: Abdomen is soft.      Tenderness: There is no abdominal tenderness. There is no guarding.   Musculoskeletal:      Cervical back: Normal range of motion and neck supple.      Right lower leg: No edema.      Left lower leg: No edema.   Skin:     General: Skin is warm and dry.      Findings: No rash.   Neurological:      Mental Status: She is alert and oriented to person, place, and time.      Cranial Nerves: No cranial nerve deficit.   Psychiatric:         Mood and Affect: Mood normal.         Behavior: Behavior normal.         Thought Content: Thought content normal.         Judgment: Judgment normal.         Lab Results   Component Value Date    WBC 4.27 10/03/2022    HGB 12.5  10/03/2022    HCT 41.2 10/03/2022     10/03/2022    CHOL 194 10/03/2022    TRIG 70 10/03/2022    HDL 58 10/03/2022    ALT 7 (L) 10/03/2022    AST 12 10/03/2022     04/14/2023    K 4.0 04/14/2023     04/14/2023    CREATININE 1.5 (H) 04/14/2023    BUN 21 04/14/2023    CO2 26 04/14/2023    TSH 1.744 07/24/2023    INR 1.0 06/21/2022    HGBA1C 5.6 04/14/2023       Assessment:     1. Annual physical exam    2. Hypertension, unspecified type    3. Dyslipidemia    4. Prediabetes    5. Stage 3b chronic kidney disease         Plan:     Annual physical exam    Hypertension, unspecified type    Dyslipidemia    Prediabetes    Stage 3b chronic kidney disease    Other orders  -     ALPRAZolam (XANAX) 0.5 MG tablet; Take 1 tablet (0.5 mg total) by mouth nightly as needed for Insomnia.  Dispense: 30 tablet; Refill: 5      Labs tomorrow AM  Cont meds  BL ears flushed out  Flu shot today  Avoid NSAIDs/stay well hydrated  Fu 6 mos for med check

## 2023-10-20 ENCOUNTER — PATIENT MESSAGE (OUTPATIENT)
Dept: INTERNAL MEDICINE | Facility: CLINIC | Age: 73
End: 2023-10-20
Payer: MEDICARE

## 2023-10-25 ENCOUNTER — PATIENT MESSAGE (OUTPATIENT)
Dept: INTERNAL MEDICINE | Facility: CLINIC | Age: 73
End: 2023-10-25
Payer: MEDICARE

## 2023-10-26 ENCOUNTER — LAB VISIT (OUTPATIENT)
Dept: PRIMARY CARE CLINIC | Facility: CLINIC | Age: 73
End: 2023-10-26
Payer: MEDICARE

## 2023-10-26 DIAGNOSIS — R73.03 PREDIABETES: ICD-10-CM

## 2023-10-26 DIAGNOSIS — F41.1 GAD (GENERALIZED ANXIETY DISORDER): ICD-10-CM

## 2023-10-26 DIAGNOSIS — I10 HYPERTENSION, UNSPECIFIED TYPE: ICD-10-CM

## 2023-10-26 DIAGNOSIS — E78.5 DYSLIPIDEMIA: ICD-10-CM

## 2023-10-26 LAB
ALBUMIN SERPL BCP-MCNC: 3.8 G/DL (ref 3.5–5.2)
ALP SERPL-CCNC: 60 U/L (ref 55–135)
ALT SERPL W/O P-5'-P-CCNC: 9 U/L (ref 10–44)
ANION GAP SERPL CALC-SCNC: 8 MMOL/L (ref 8–16)
AST SERPL-CCNC: 13 U/L (ref 10–40)
BASOPHILS # BLD AUTO: 0.05 K/UL (ref 0–0.2)
BASOPHILS NFR BLD: 1.3 % (ref 0–1.9)
BILIRUB SERPL-MCNC: 0.4 MG/DL (ref 0.1–1)
BUN SERPL-MCNC: 18 MG/DL (ref 8–23)
CALCIUM SERPL-MCNC: 9.3 MG/DL (ref 8.7–10.5)
CHLORIDE SERPL-SCNC: 109 MMOL/L (ref 95–110)
CHOLEST SERPL-MCNC: 209 MG/DL (ref 120–199)
CHOLEST/HDLC SERPL: 3.2 {RATIO} (ref 2–5)
CO2 SERPL-SCNC: 23 MMOL/L (ref 23–29)
CREAT SERPL-MCNC: 1.3 MG/DL (ref 0.5–1.4)
DIFFERENTIAL METHOD: ABNORMAL
EOSINOPHIL # BLD AUTO: 0.2 K/UL (ref 0–0.5)
EOSINOPHIL NFR BLD: 3.8 % (ref 0–8)
ERYTHROCYTE [DISTWIDTH] IN BLOOD BY AUTOMATED COUNT: 16.2 % (ref 11.5–14.5)
EST. GFR  (NO RACE VARIABLE): 42.9 ML/MIN/1.73 M^2
ESTIMATED AVG GLUCOSE: 111 MG/DL (ref 68–131)
GLUCOSE SERPL-MCNC: 85 MG/DL (ref 70–110)
HBA1C MFR BLD: 5.5 % (ref 4–5.6)
HCT VFR BLD AUTO: 42.6 % (ref 37–48.5)
HDLC SERPL-MCNC: 65 MG/DL (ref 40–75)
HDLC SERPL: 31.1 % (ref 20–50)
HGB BLD-MCNC: 13.1 G/DL (ref 12–16)
IMM GRANULOCYTES # BLD AUTO: 0.01 K/UL (ref 0–0.04)
IMM GRANULOCYTES NFR BLD AUTO: 0.3 % (ref 0–0.5)
LDLC SERPL CALC-MCNC: 129.4 MG/DL (ref 63–159)
LYMPHOCYTES # BLD AUTO: 1.3 K/UL (ref 1–4.8)
LYMPHOCYTES NFR BLD: 34 % (ref 18–48)
MCH RBC QN AUTO: 25 PG (ref 27–31)
MCHC RBC AUTO-ENTMCNC: 30.8 G/DL (ref 32–36)
MCV RBC AUTO: 81 FL (ref 82–98)
MONOCYTES # BLD AUTO: 0.3 K/UL (ref 0.3–1)
MONOCYTES NFR BLD: 7.7 % (ref 4–15)
NEUTROPHILS # BLD AUTO: 2.1 K/UL (ref 1.8–7.7)
NEUTROPHILS NFR BLD: 52.9 % (ref 38–73)
NONHDLC SERPL-MCNC: 144 MG/DL
NRBC BLD-RTO: 0 /100 WBC
PLATELET # BLD AUTO: 219 K/UL (ref 150–450)
PMV BLD AUTO: 10.7 FL (ref 9.2–12.9)
POTASSIUM SERPL-SCNC: 4.1 MMOL/L (ref 3.5–5.1)
PROT SERPL-MCNC: 7.3 G/DL (ref 6–8.4)
RBC # BLD AUTO: 5.25 M/UL (ref 4–5.4)
SODIUM SERPL-SCNC: 140 MMOL/L (ref 136–145)
TRIGL SERPL-MCNC: 73 MG/DL (ref 30–150)
TSH SERPL DL<=0.005 MIU/L-ACNC: 2.14 UIU/ML (ref 0.4–4)
WBC # BLD AUTO: 3.91 K/UL (ref 3.9–12.7)

## 2023-10-26 PROCEDURE — 84443 ASSAY THYROID STIM HORMONE: CPT | Mod: HCNC | Performed by: NURSE PRACTITIONER

## 2023-10-26 PROCEDURE — 80061 LIPID PANEL: CPT | Mod: HCNC | Performed by: NURSE PRACTITIONER

## 2023-10-26 PROCEDURE — 80053 COMPREHEN METABOLIC PANEL: CPT | Mod: HCNC | Performed by: NURSE PRACTITIONER

## 2023-10-26 PROCEDURE — 36415 PR COLLECTION VENOUS BLOOD,VENIPUNCTURE: ICD-10-PCS | Mod: S$GLB,,, | Performed by: NURSE PRACTITIONER

## 2023-10-26 PROCEDURE — 83036 HEMOGLOBIN GLYCOSYLATED A1C: CPT | Mod: HCNC | Performed by: NURSE PRACTITIONER

## 2023-10-26 PROCEDURE — 85025 COMPLETE CBC W/AUTO DIFF WBC: CPT | Mod: HCNC | Performed by: NURSE PRACTITIONER

## 2023-10-26 PROCEDURE — 36415 COLL VENOUS BLD VENIPUNCTURE: CPT | Mod: S$GLB,,, | Performed by: NURSE PRACTITIONER

## 2024-01-04 ENCOUNTER — TELEPHONE (OUTPATIENT)
Dept: PSYCHIATRY | Facility: CLINIC | Age: 74
End: 2024-01-04
Payer: MEDICARE

## 2024-01-08 ENCOUNTER — OFFICE VISIT (OUTPATIENT)
Dept: PSYCHIATRY | Facility: CLINIC | Age: 74
End: 2024-01-08
Payer: MEDICARE

## 2024-01-08 DIAGNOSIS — Z65.8 PSYCHOSOCIAL STRESSORS: ICD-10-CM

## 2024-01-08 DIAGNOSIS — F41.9 ANXIETY: ICD-10-CM

## 2024-01-08 DIAGNOSIS — F32.A DEPRESSION, UNSPECIFIED DEPRESSION TYPE: ICD-10-CM

## 2024-01-08 PROCEDURE — 90791 PSYCH DIAGNOSTIC EVALUATION: CPT | Mod: S$GLB,,, | Performed by: SOCIAL WORKER

## 2024-01-08 PROCEDURE — 99999 PR PBB SHADOW E&M-EST. PATIENT-LVL II: CPT | Mod: PBBFAC,,, | Performed by: SOCIAL WORKER

## 2024-01-08 PROCEDURE — 1159F MED LIST DOCD IN RCRD: CPT | Mod: CPTII,S$GLB,, | Performed by: SOCIAL WORKER
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
## 2024-01-26 ENCOUNTER — TELEPHONE (OUTPATIENT)
Dept: PSYCHIATRY | Facility: CLINIC | Age: 74
End: 2024-01-26
Payer: MEDICARE

## 2024-01-30 ENCOUNTER — OFFICE VISIT (OUTPATIENT)
Dept: PSYCHIATRY | Facility: CLINIC | Age: 74
End: 2024-01-30
Payer: MEDICARE

## 2024-01-30 DIAGNOSIS — F41.9 ANXIETY: Primary | ICD-10-CM

## 2024-01-30 DIAGNOSIS — Z63.0 MARITAL CONFLICT: ICD-10-CM

## 2024-01-30 DIAGNOSIS — Z63.6 CAREGIVER BURDEN: ICD-10-CM

## 2024-01-30 PROCEDURE — 90834 PSYTX W PT 45 MINUTES: CPT | Mod: S$GLB,,, | Performed by: SOCIAL WORKER

## 2024-01-30 SDOH — SOCIAL DETERMINANTS OF HEALTH (SDOH): PROBLEMS IN RELATIONSHIP WITH SPOUSE OR PARTNER: Z63.0

## 2024-01-30 SDOH — SOCIAL DETERMINANTS OF HEALTH (SDOH): DEPENDENT RELATIVE NEEDING CARE AT HOME: Z63.6

## 2024-01-30 NOTE — PROGRESS NOTES
Individual Psychotherapy Follow-up Visit Progress Note (PhD/LCSW)     Outpatient Psychotherapy - 45 minutes with patient (38-52 minutes) - [id number]    Date: 01/30/2024    Visit Type: In Person Visit at [facility]      1/30/2024  MRN: [id number]  Primary Care Provider: Yobani Osborn MD    Tali Fitzpatrick is a 73 y.o. female who presents today for follow-up of anxiety and relational problem. Met with patient.      Preferred Name: Tali     Subjective:     Last encounter (with this provider): 1/8/2024     Content of Current Session: Pt seen for first f/u visit. Continued to obtain psychosocial history and assess functional impairment. Discussed goals for therapy. Explored pt's current stressors, particularly her problems with her , who is in his 90s. Discussed the need for increased support (pt does not disclose or minimizes issues with her  due to fear of judgment from others). Discussed strategies to manage anxiety and worry.    Therapeutic Interventions Utilized During Current Session: Supportive Therapy      Objective:       Mental Status Evaluation  Appearance: unremarkable, age appropriate  Behavior: normal, friendly and cooperative  Speech: normal tone, normal rate, normal pitch, normal volume  Mood: anxious  Affect: congruent and appropriate  Thought Process: normal and logical  Thought Content: normal, no suicidality, no homicidality, delusions, or paranoia  Sensorium: grossly intact  Cognition: grossly intact  Insight: good  Judgment: adequate to circumstances    Risk parameters:  Patient reports no suicidal ideation  Patient reports no homicidal ideation  Patient reports no self-injurious behavior  Patient reports no violent behavior      Assessment & Plan:     The patient's response to the interventions is accepting    The patient's progress toward treatment goals is fair     Homework assigned: daily journaling and practice relaxation skills daily     Treatment  plan:   A. Target symptoms: Anxiety and Poor Coping Skills   B. Therapeutic modalities: insight oriented psychotherapy, behavior modifying psychotherapy, supportive psychotherapy  C. Why chosen therapy is appropriate versus another modality: relevant to diagnosis   D. Outcome monitoring methods: self report, observation, rating scales, feedback from clinical staff      Visit Diagnosis:   1. Anxiety    2. Marital conflict    3. Caregiver burden        Follow-up: individual psychotherapy and medication management by physician    Return to Clinic: as scheduled  Pt Reported to Schedule Self via Epic EMR MyChart Application and/or Department Support Staff        Debra Hussein LCSW-CALES, [facility]  Outpatient Psychiatry

## 2024-02-06 ENCOUNTER — OFFICE VISIT (OUTPATIENT)
Dept: PRIMARY CARE CLINIC | Facility: CLINIC | Age: 74
End: 2024-02-06
Payer: MEDICARE

## 2024-02-06 VITALS
WEIGHT: 192 LBS | BODY MASS INDEX: 30.13 KG/M2 | HEART RATE: 76 BPM | HEIGHT: 67 IN | DIASTOLIC BLOOD PRESSURE: 84 MMHG | SYSTOLIC BLOOD PRESSURE: 138 MMHG | OXYGEN SATURATION: 97 %

## 2024-02-06 DIAGNOSIS — F41.1 GAD (GENERALIZED ANXIETY DISORDER): ICD-10-CM

## 2024-02-06 DIAGNOSIS — I10 HYPERTENSION, UNSPECIFIED TYPE: ICD-10-CM

## 2024-02-06 DIAGNOSIS — E78.5 DYSLIPIDEMIA: ICD-10-CM

## 2024-02-06 PROCEDURE — 3079F DIAST BP 80-89 MM HG: CPT | Mod: CPTII,S$GLB,, | Performed by: PHYSICIAN ASSISTANT

## 2024-02-06 PROCEDURE — 1101F PT FALLS ASSESS-DOCD LE1/YR: CPT | Mod: CPTII,S$GLB,, | Performed by: PHYSICIAN ASSISTANT

## 2024-02-06 PROCEDURE — 3075F SYST BP GE 130 - 139MM HG: CPT | Mod: CPTII,S$GLB,, | Performed by: PHYSICIAN ASSISTANT

## 2024-02-06 PROCEDURE — 1126F AMNT PAIN NOTED NONE PRSNT: CPT | Mod: CPTII,S$GLB,, | Performed by: PHYSICIAN ASSISTANT

## 2024-02-06 PROCEDURE — 3288F FALL RISK ASSESSMENT DOCD: CPT | Mod: CPTII,S$GLB,, | Performed by: PHYSICIAN ASSISTANT

## 2024-02-06 PROCEDURE — 99214 OFFICE O/P EST MOD 30 MIN: CPT | Mod: S$GLB,,, | Performed by: PHYSICIAN ASSISTANT

## 2024-02-06 PROCEDURE — 1159F MED LIST DOCD IN RCRD: CPT | Mod: CPTII,S$GLB,, | Performed by: PHYSICIAN ASSISTANT

## 2024-02-06 RX ORDER — CITALOPRAM 40 MG/1
40 TABLET, FILM COATED ORAL DAILY
Qty: 90 TABLET | Refills: 3 | Status: SHIPPED | OUTPATIENT
Start: 2024-02-06 | End: 2024-03-14

## 2024-02-06 RX ORDER — HYDROXYZINE PAMOATE 50 MG/1
CAPSULE ORAL
Qty: 120 CAPSULE | Refills: 2 | Status: SHIPPED | OUTPATIENT
Start: 2024-02-06

## 2024-02-06 RX ORDER — PRAVASTATIN SODIUM 20 MG/1
20 TABLET ORAL DAILY
Qty: 90 TABLET | Refills: 3 | Status: SHIPPED | OUTPATIENT
Start: 2024-02-06 | End: 2024-05-09

## 2024-02-06 RX ORDER — AMLODIPINE BESYLATE 5 MG/1
5 TABLET ORAL DAILY
Qty: 90 TABLET | Refills: 3 | Status: SHIPPED | OUTPATIENT
Start: 2024-02-06 | End: 2024-05-09

## 2024-02-06 RX ORDER — BUSPIRONE HYDROCHLORIDE 10 MG/1
20 TABLET ORAL 3 TIMES DAILY PRN
Qty: 90 TABLET | Refills: 11 | Status: SHIPPED | OUTPATIENT
Start: 2024-02-06 | End: 2024-03-14

## 2024-02-06 RX ORDER — ALPRAZOLAM 0.5 MG/1
0.5 TABLET ORAL NIGHTLY PRN
Qty: 30 TABLET | Refills: 5 | Status: SHIPPED | OUTPATIENT
Start: 2024-02-06 | End: 2024-03-14 | Stop reason: ALTCHOICE

## 2024-02-06 RX ORDER — ATENOLOL 50 MG/1
50 TABLET ORAL DAILY
Qty: 90 TABLET | Refills: 3 | Status: SHIPPED | OUTPATIENT
Start: 2024-02-06

## 2024-02-06 NOTE — PROGRESS NOTES
Subjective     Patient ID: Tali Fitzpatrick is a 73 y.o. female.    Chief Complaint: Hypertension    Patient is a 74 yo female coming in for a check up. I am seeing her for the first time.     Patient Active Problem List:     Bilateral primary osteoarthritis of knee     Bilateral knee pain     Stage 3b chronic kidney disease     Hypertension: controlled with medication     Anxiety: Patient states this is out of control. It is triggered by her 96 yo . He is frequently  being negative with her.      Hyperlipidemia     Vitamin D deficiency     Cognitive complaints with normal exam     Word finding difficulty     Psychosocial stressors     Other amnesia     Depression     History of stroke     Coronary artery disease: has no cardiac follow up at this time    Past Medical History:  No date: Anxiety and depression  No date: CKD (chronic kidney disease)  No date: DJD (degenerative joint disease)      Comment:  bilateral knees   Hyalgan series completed 8/27/14  No date: Hyperlipidemia  No date: Hypertension  No date: Insomnia          Hypertension  Pertinent negatives include no chest pain, headaches, palpitations or shortness of breath.     Review of Systems   Constitutional:  Negative for activity change, fatigue and fever.   HENT: Negative.     Respiratory:  Negative for chest tightness, shortness of breath and wheezing.    Cardiovascular:  Negative for chest pain, palpitations and leg swelling.   Gastrointestinal:  Negative for abdominal pain, diarrhea, vomiting and reflux.   Neurological:  Negative for dizziness, vertigo, seizures, weakness, headaches and memory loss.   Psychiatric/Behavioral:  Positive for dysphoric mood and sleep disturbance. The patient is nervous/anxious.           Objective     Physical Exam  Vitals reviewed.   Constitutional:       General: She is not in acute distress.     Appearance: Normal appearance. She is not ill-appearing, toxic-appearing or diaphoretic.   Neck:      Vascular: No  carotid bruit.   Cardiovascular:      Rate and Rhythm: Normal rate.      Pulses: Normal pulses.      Heart sounds: Normal heart sounds. No murmur heard.     No friction rub. No gallop.   Pulmonary:      Effort: Pulmonary effort is normal. No respiratory distress.      Breath sounds: Normal breath sounds. No stridor. No wheezing, rhonchi or rales.   Chest:      Chest wall: No tenderness.   Abdominal:      Palpations: Abdomen is soft.      Tenderness: There is no abdominal tenderness.   Musculoskeletal:         General: No swelling.      Cervical back: No rigidity or tenderness.      Right lower leg: No edema.      Left lower leg: No edema.   Lymphadenopathy:      Cervical: No cervical adenopathy.   Skin:     General: Skin is warm and dry.   Neurological:      Mental Status: She is alert.   Psychiatric:         Mood and Affect: Mood normal.         Behavior: Behavior normal.         Thought Content: Thought content normal.         Judgment: Judgment normal.       Lab Results   Component Value Date    WBC 3.91 10/26/2023    HGB 13.1 10/26/2023    HCT 42.6 10/26/2023    MCV 81 (L) 10/26/2023     10/26/2023     CMP  Sodium   Date Value Ref Range Status   10/26/2023 140 136 - 145 mmol/L Final     Potassium   Date Value Ref Range Status   10/26/2023 4.1 3.5 - 5.1 mmol/L Final     Chloride   Date Value Ref Range Status   10/26/2023 109 95 - 110 mmol/L Final     CO2   Date Value Ref Range Status   10/26/2023 23 23 - 29 mmol/L Final     Glucose   Date Value Ref Range Status   10/26/2023 85 70 - 110 mg/dL Final     BUN   Date Value Ref Range Status   10/26/2023 18 8 - 23 mg/dL Final     Creatinine   Date Value Ref Range Status   10/26/2023 1.3 0.5 - 1.4 mg/dL Final     Calcium   Date Value Ref Range Status   10/26/2023 9.3 8.7 - 10.5 mg/dL Final     Total Protein   Date Value Ref Range Status   10/26/2023 7.3 6.0 - 8.4 g/dL Final     Albumin   Date Value Ref Range Status   10/26/2023 3.8 3.5 - 5.2 g/dL Final     Total  Bilirubin   Date Value Ref Range Status   10/26/2023 0.4 0.1 - 1.0 mg/dL Final     Comment:     For infants and newborns, interpretation of results should be based  on gestational age, weight and in agreement with clinical  observations.    Premature Infant recommended reference ranges:  Up to 24 hours.............<8.0 mg/dL  Up to 48 hours............<12.0 mg/dL  3-5 days..................<15.0 mg/dL  6-29 days.................<15.0 mg/dL       Alkaline Phosphatase   Date Value Ref Range Status   10/26/2023 60 55 - 135 U/L Final     AST   Date Value Ref Range Status   10/26/2023 13 10 - 40 U/L Final     ALT   Date Value Ref Range Status   10/26/2023 9 (L) 10 - 44 U/L Final     Anion Gap   Date Value Ref Range Status   10/26/2023 8 8 - 16 mmol/L Final     eGFR if    Date Value Ref Range Status   03/11/2022 51.8 (A) >60 mL/min/1.73 m^2 Final     eGFR    Date Value Ref Range Status   07/12/2022 45 mL/min/1.73mSq Final     Comment:     In accordance with NKF-ASN Task Force recommendation, calculation based on the Chronic Kidney Disease Epidemiology Collaboration (CKD-EPI) equation without adjustment for race. eGFR adjusted for gender and age and calculated in ml/min/1.73mSquared. eGFR cannot be calculated if patient is under 18 years of age.     Reference Range:   >= 60 ml/min/1.73mSquared.     eGFR if non    Date Value Ref Range Status   03/11/2022 44.9 (A) >60 mL/min/1.73 m^2 Final     Comment:     Calculation used to obtain the estimated glomerular filtration  rate (eGFR) is the CKD-EPI equation.        Lab Results   Component Value Date    CHOL 209 (H) 10/26/2023     Lab Results   Component Value Date    HDL 65 10/26/2023     Lab Results   Component Value Date    LDLCALC 129.4 10/26/2023     Lab Results   Component Value Date    TRIG 73 10/26/2023     Lab Results   Component Value Date    CHOLHDL 31.1 10/26/2023     Lab Results   Component Value Date    HGBA1C 5.5  10/26/2023         Assessment and Plan     1. CALVIN (generalized anxiety disorder)  -     busPIRone (BUSPAR) 10 MG tablet; Take 2 tablets (20 mg total) by mouth 3 (three) times daily as needed (anxiety).  Dispense: 90 tablet; Refill: 11  -     citalopram (CELEXA) 40 MG tablet; Take 1 tablet (40 mg total) by mouth once daily.  Dispense: 90 tablet; Refill: 3    2. Dyslipidemia  -     pravastatin (PRAVACHOL) 20 MG tablet; Take 1 tablet (20 mg total) by mouth once daily.  Dispense: 90 tablet; Refill: 3    3. Hypertension, unspecified type  -     amLODIPine (NORVASC) 5 MG tablet; Take 1 tablet (5 mg total) by mouth once daily.  Dispense: 90 tablet; Refill: 3  -     atenoloL (TENORMIN) 50 MG tablet; Take 1 tablet (50 mg total) by mouth once daily.  Dispense: 90 tablet; Refill: 3    Other orders  -     hydrOXYzine pamoate (VISTARIL) 50 MG Cap; TAKE 1 CAPSULE EVERY 8 (EIGHT) HOURS AS NEEDED.  Dispense: 120 capsule; Refill: 2  -     ALPRAZolam (XANAX) 0.5 MG tablet; Take 1 tablet (0.5 mg total) by mouth nightly as needed for Insomnia.  Dispense: 30 tablet; Refill: 5      Consider high dose statin and cardiology consults. Patient is a little resistant at this time     I spent 30 minutes on this encounter, time includes face-to-face, chart review, documentation, test review and orders.           Follow up in about 6 months (around 8/6/2024).

## 2024-03-12 ENCOUNTER — TELEPHONE (OUTPATIENT)
Dept: PSYCHIATRY | Facility: CLINIC | Age: 74
End: 2024-03-12
Payer: MEDICARE

## 2024-03-14 ENCOUNTER — OFFICE VISIT (OUTPATIENT)
Dept: PSYCHIATRY | Facility: CLINIC | Age: 74
End: 2024-03-14
Payer: MEDICARE

## 2024-03-14 VITALS — HEART RATE: 70 BPM | SYSTOLIC BLOOD PRESSURE: 155 MMHG | DIASTOLIC BLOOD PRESSURE: 90 MMHG

## 2024-03-14 DIAGNOSIS — Z65.8 PSYCHOSOCIAL STRESSORS: ICD-10-CM

## 2024-03-14 DIAGNOSIS — F41.1 GAD (GENERALIZED ANXIETY DISORDER): Primary | ICD-10-CM

## 2024-03-14 DIAGNOSIS — F41.9 ANXIETY: ICD-10-CM

## 2024-03-14 DIAGNOSIS — F33.9 RECURRENT MAJOR DEPRESSIVE DISORDER, REMISSION STATUS UNSPECIFIED: ICD-10-CM

## 2024-03-14 PROCEDURE — 1159F MED LIST DOCD IN RCRD: CPT | Mod: CPTII,S$GLB,, | Performed by: PSYCHIATRY & NEUROLOGY

## 2024-03-14 PROCEDURE — 99999 PR PBB SHADOW E&M-EST. PATIENT-LVL II: CPT | Mod: PBBFAC,,, | Performed by: PSYCHIATRY & NEUROLOGY

## 2024-03-14 PROCEDURE — 3077F SYST BP >= 140 MM HG: CPT | Mod: CPTII,S$GLB,, | Performed by: PSYCHIATRY & NEUROLOGY

## 2024-03-14 PROCEDURE — 3080F DIAST BP >= 90 MM HG: CPT | Mod: CPTII,S$GLB,, | Performed by: PSYCHIATRY & NEUROLOGY

## 2024-03-14 PROCEDURE — 99215 OFFICE O/P EST HI 40 MIN: CPT | Mod: S$GLB,,, | Performed by: PSYCHIATRY & NEUROLOGY

## 2024-03-14 PROCEDURE — 1160F RVW MEDS BY RX/DR IN RCRD: CPT | Mod: CPTII,S$GLB,, | Performed by: PSYCHIATRY & NEUROLOGY

## 2024-03-14 RX ORDER — DULOXETIN HYDROCHLORIDE 20 MG/1
20 CAPSULE, DELAYED RELEASE ORAL DAILY
Qty: 30 CAPSULE | Refills: 2 | Status: SHIPPED | OUTPATIENT
Start: 2024-03-14 | End: 2024-05-07 | Stop reason: SDUPTHER

## 2024-03-14 RX ORDER — BUSPIRONE HYDROCHLORIDE 30 MG/1
30 TABLET ORAL 2 TIMES DAILY
Qty: 60 TABLET | Refills: 2 | Status: SHIPPED | OUTPATIENT
Start: 2024-03-14 | End: 2024-05-07 | Stop reason: SDUPTHER
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
## 2024-05-03 ENCOUNTER — TELEPHONE (OUTPATIENT)
Dept: PSYCHIATRY | Facility: CLINIC | Age: 74
End: 2024-05-03
Payer: MEDICARE
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
## 2024-05-07 ENCOUNTER — OFFICE VISIT (OUTPATIENT)
Dept: PSYCHIATRY | Facility: CLINIC | Age: 74
End: 2024-05-07
Payer: MEDICARE

## 2024-05-07 VITALS — SYSTOLIC BLOOD PRESSURE: 172 MMHG | HEART RATE: 69 BPM | DIASTOLIC BLOOD PRESSURE: 85 MMHG

## 2024-05-07 DIAGNOSIS — F41.1 GAD (GENERALIZED ANXIETY DISORDER): Primary | ICD-10-CM

## 2024-05-07 DIAGNOSIS — G47.00 INSOMNIA, UNSPECIFIED TYPE: ICD-10-CM

## 2024-05-07 DIAGNOSIS — F33.9 RECURRENT MAJOR DEPRESSIVE DISORDER, REMISSION STATUS UNSPECIFIED: ICD-10-CM

## 2024-05-07 PROCEDURE — 99999 PR PBB SHADOW E&M-EST. PATIENT-LVL I: CPT | Mod: PBBFAC,,, | Performed by: PSYCHIATRY & NEUROLOGY

## 2024-05-07 PROCEDURE — 99214 OFFICE O/P EST MOD 30 MIN: CPT | Mod: S$GLB,,, | Performed by: PSYCHIATRY & NEUROLOGY

## 2024-05-07 PROCEDURE — 3079F DIAST BP 80-89 MM HG: CPT | Mod: CPTII,S$GLB,, | Performed by: PSYCHIATRY & NEUROLOGY

## 2024-05-07 PROCEDURE — 3077F SYST BP >= 140 MM HG: CPT | Mod: CPTII,S$GLB,, | Performed by: PSYCHIATRY & NEUROLOGY

## 2024-05-07 RX ORDER — BUSPIRONE HYDROCHLORIDE 30 MG/1
30 TABLET ORAL 2 TIMES DAILY
Qty: 60 TABLET | Refills: 2 | Status: SHIPPED | OUTPATIENT
Start: 2024-05-07

## 2024-05-07 RX ORDER — DULOXETIN HYDROCHLORIDE 20 MG/1
20 CAPSULE, DELAYED RELEASE ORAL DAILY
Qty: 30 CAPSULE | Refills: 2 | Status: SHIPPED | OUTPATIENT
Start: 2024-05-07

## 2024-05-07 RX ORDER — TRAZODONE HYDROCHLORIDE 50 MG/1
50 TABLET ORAL NIGHTLY
Qty: 30 TABLET | Refills: 2 | Status: SHIPPED | OUTPATIENT
Start: 2024-05-07

## 2024-05-09 ENCOUNTER — OFFICE VISIT (OUTPATIENT)
Dept: PRIMARY CARE CLINIC | Facility: CLINIC | Age: 74
End: 2024-05-09
Payer: MEDICARE

## 2024-05-09 VITALS
OXYGEN SATURATION: 98 % | RESPIRATION RATE: 18 BRPM | SYSTOLIC BLOOD PRESSURE: 150 MMHG | DIASTOLIC BLOOD PRESSURE: 84 MMHG | HEART RATE: 74 BPM

## 2024-05-09 DIAGNOSIS — R30.0 DYSURIA: ICD-10-CM

## 2024-05-09 DIAGNOSIS — Z12.11 SCREENING FOR MALIGNANT NEOPLASM OF COLON: ICD-10-CM

## 2024-05-09 DIAGNOSIS — I10 HYPERTENSION, UNSPECIFIED TYPE: Primary | ICD-10-CM

## 2024-05-09 DIAGNOSIS — E78.5 DYSLIPIDEMIA: ICD-10-CM

## 2024-05-09 DIAGNOSIS — Z86.73 HISTORY OF TIA (TRANSIENT ISCHEMIC ATTACK) AND STROKE: ICD-10-CM

## 2024-05-09 LAB
ALBUMIN SERPL BCP-MCNC: 3.6 G/DL (ref 3.5–5.2)
ALBUMIN/CREAT UR: 20.2 UG/MG (ref 0–30)
ALP SERPL-CCNC: 57 U/L (ref 55–135)
ALT SERPL W/O P-5'-P-CCNC: 9 U/L (ref 10–44)
ANION GAP SERPL CALC-SCNC: 7 MMOL/L (ref 8–16)
AST SERPL-CCNC: 13 U/L (ref 10–40)
BASOPHILS # BLD AUTO: 0.04 K/UL (ref 0–0.2)
BASOPHILS NFR BLD: 1 % (ref 0–1.9)
BILIRUB SERPL-MCNC: 0.4 MG/DL (ref 0.1–1)
BILIRUBIN, UA POC OHS: NEGATIVE
BLOOD, UA POC OHS: NEGATIVE
BUN SERPL-MCNC: 18 MG/DL (ref 8–23)
CALCIUM SERPL-MCNC: 9.1 MG/DL (ref 8.7–10.5)
CHLORIDE SERPL-SCNC: 107 MMOL/L (ref 95–110)
CLARITY, UA POC OHS: CLEAR
CO2 SERPL-SCNC: 25 MMOL/L (ref 23–29)
COLOR, UA POC OHS: YELLOW
CREAT SERPL-MCNC: 1.3 MG/DL (ref 0.5–1.4)
CREAT UR-MCNC: 124 MG/DL (ref 15–325)
DIFFERENTIAL METHOD BLD: ABNORMAL
EOSINOPHIL # BLD AUTO: 0.2 K/UL (ref 0–0.5)
EOSINOPHIL NFR BLD: 5 % (ref 0–8)
ERYTHROCYTE [DISTWIDTH] IN BLOOD BY AUTOMATED COUNT: 15.5 % (ref 11.5–14.5)
EST. GFR  (NO RACE VARIABLE): 42.9 ML/MIN/1.73 M^2
GLUCOSE SERPL-MCNC: 86 MG/DL (ref 70–110)
GLUCOSE, UA POC OHS: NEGATIVE
HCT VFR BLD AUTO: 40.3 % (ref 37–48.5)
HGB BLD-MCNC: 12.8 G/DL (ref 12–16)
IMM GRANULOCYTES # BLD AUTO: 0.01 K/UL (ref 0–0.04)
IMM GRANULOCYTES NFR BLD AUTO: 0.2 % (ref 0–0.5)
INR PPP: 1 (ref 0.8–1.2)
KETONES, UA POC OHS: NEGATIVE
LEUKOCYTES, UA POC OHS: ABNORMAL
LYMPHOCYTES # BLD AUTO: 1.2 K/UL (ref 1–4.8)
LYMPHOCYTES NFR BLD: 28.1 % (ref 18–48)
MCH RBC QN AUTO: 25.5 PG (ref 27–31)
MCHC RBC AUTO-ENTMCNC: 31.8 G/DL (ref 32–36)
MCV RBC AUTO: 80 FL (ref 82–98)
MICROALBUMIN UR DL<=1MG/L-MCNC: 25 UG/ML
MONOCYTES # BLD AUTO: 0.4 K/UL (ref 0.3–1)
MONOCYTES NFR BLD: 8.6 % (ref 4–15)
NEUTROPHILS # BLD AUTO: 2.4 K/UL (ref 1.8–7.7)
NEUTROPHILS NFR BLD: 57.1 % (ref 38–73)
NITRITE, UA POC OHS: NEGATIVE
NRBC BLD-RTO: 0 /100 WBC
PH, UA POC OHS: 7
PLATELET # BLD AUTO: 211 K/UL (ref 150–450)
PMV BLD AUTO: 11.1 FL (ref 9.2–12.9)
POTASSIUM SERPL-SCNC: 3.9 MMOL/L (ref 3.5–5.1)
PROT SERPL-MCNC: 7.1 G/DL (ref 6–8.4)
PROTEIN, UA POC OHS: NEGATIVE
PROTHROMBIN TIME: 10.9 SEC (ref 9–12.5)
RBC # BLD AUTO: 5.02 M/UL (ref 4–5.4)
SODIUM SERPL-SCNC: 139 MMOL/L (ref 136–145)
SPECIFIC GRAVITY, UA POC OHS: 1.02
UROBILINOGEN, UA POC OHS: 0.2
WBC # BLD AUTO: 4.2 K/UL (ref 3.9–12.7)

## 2024-05-09 PROCEDURE — 36415 COLL VENOUS BLD VENIPUNCTURE: CPT | Mod: S$GLB,,, | Performed by: INTERNAL MEDICINE

## 2024-05-09 PROCEDURE — 81003 URINALYSIS AUTO W/O SCOPE: CPT | Mod: QW,S$GLB,, | Performed by: PHYSICIAN ASSISTANT

## 2024-05-09 PROCEDURE — 1159F MED LIST DOCD IN RCRD: CPT | Mod: CPTII,S$GLB,, | Performed by: PHYSICIAN ASSISTANT

## 2024-05-09 PROCEDURE — 1160F RVW MEDS BY RX/DR IN RCRD: CPT | Mod: CPTII,S$GLB,, | Performed by: PHYSICIAN ASSISTANT

## 2024-05-09 PROCEDURE — 3077F SYST BP >= 140 MM HG: CPT | Mod: CPTII,S$GLB,, | Performed by: PHYSICIAN ASSISTANT

## 2024-05-09 PROCEDURE — 87086 URINE CULTURE/COLONY COUNT: CPT | Performed by: PHYSICIAN ASSISTANT

## 2024-05-09 PROCEDURE — 3079F DIAST BP 80-89 MM HG: CPT | Mod: CPTII,S$GLB,, | Performed by: PHYSICIAN ASSISTANT

## 2024-05-09 PROCEDURE — 85025 COMPLETE CBC W/AUTO DIFF WBC: CPT | Performed by: PHYSICIAN ASSISTANT

## 2024-05-09 PROCEDURE — 82043 UR ALBUMIN QUANTITATIVE: CPT | Performed by: PHYSICIAN ASSISTANT

## 2024-05-09 PROCEDURE — 1126F AMNT PAIN NOTED NONE PRSNT: CPT | Mod: CPTII,S$GLB,, | Performed by: PHYSICIAN ASSISTANT

## 2024-05-09 PROCEDURE — 80053 COMPREHEN METABOLIC PANEL: CPT | Performed by: PHYSICIAN ASSISTANT

## 2024-05-09 PROCEDURE — 99214 OFFICE O/P EST MOD 30 MIN: CPT | Mod: 25,S$GLB,, | Performed by: PHYSICIAN ASSISTANT

## 2024-05-09 PROCEDURE — 85610 PROTHROMBIN TIME: CPT | Performed by: PHYSICIAN ASSISTANT

## 2024-05-09 RX ORDER — PRAVASTATIN SODIUM 40 MG/1
40 TABLET ORAL DAILY
Qty: 90 TABLET | Refills: 3 | Status: SHIPPED | OUTPATIENT
Start: 2024-05-09

## 2024-05-09 RX ORDER — AMLODIPINE BESYLATE 10 MG/1
10 TABLET ORAL DAILY
Qty: 90 TABLET | Refills: 3 | Status: SHIPPED | OUTPATIENT
Start: 2024-05-09

## 2024-05-09 NOTE — PROGRESS NOTES
Subjective     Patient ID: Tali Fitzpatrick is a 73 y.o. female.    Chief Complaint: Follow-up    Patient is a 72 yo female coming in today for a check up. She recently was being treat by a dentist and felt she had some abnormal bleeding of the gums. He asked her to be evaluated by PCP    Patient Active Problem List:     Bilateral primary osteoarthritis of knee     Bilateral knee pain     Stage 3b chronic kidney disease     Hypertension     Anxiety     Hyperlipidemia     Vitamin D deficiency     Cognitive complaints with normal exam     Word finding difficulty     Psychosocial stressors     Other amnesia     Depression     History of stroke     Coronary artery disease    Not taking asa. Not chronic NSAID use or fish oil    Past Medical History:  No date: Anxiety and depression  No date: CKD (chronic kidney disease)  No date: DJD (degenerative joint disease)      Comment:  bilateral knees   Hyalgan series completed 8/27/14  No date: Hyperlipidemia  No date: Hypertension  No date: Insomnia      Follow-up  Pertinent negatives include no abdominal pain, chest pain, fatigue, fever or vomiting.     Review of Systems   Constitutional:  Negative for activity change, fatigue and fever.   Respiratory:  Negative for chest tightness, shortness of breath and wheezing.    Cardiovascular:  Negative for chest pain.   Gastrointestinal:  Negative for abdominal pain, diarrhea, vomiting and reflux.   Hematological:  Negative for adenopathy. Does not bruise/bleed easily.          Objective     Physical Exam  Vitals reviewed.   Constitutional:       General: She is not in acute distress.     Appearance: Normal appearance. She is not ill-appearing, toxic-appearing or diaphoretic.   Neck:      Vascular: No carotid bruit.   Cardiovascular:      Rate and Rhythm: Normal rate and regular rhythm.      Pulses: Normal pulses.      Heart sounds: Normal heart sounds. No murmur heard.     No friction rub. No gallop.   Pulmonary:      Effort:  Pulmonary effort is normal. No respiratory distress.      Breath sounds: Normal breath sounds. No stridor. No wheezing, rhonchi or rales.   Chest:      Chest wall: No tenderness.   Abdominal:      Palpations: Abdomen is soft.      Tenderness: There is no abdominal tenderness.   Musculoskeletal:      Cervical back: No rigidity or tenderness.   Lymphadenopathy:      Cervical: No cervical adenopathy.   Neurological:      Mental Status: She is alert.            Assessment and Plan     1. Screening for malignant neoplasm of colon  -     Cologuard Screening (Multitarget Stool DNA); Future; Expected date: 05/09/2024    2. Hypertension, unspecified type  -     Comprehensive Metabolic Panel; Future; Expected date: 05/09/2024  -     CBC Auto Differential; Future; Expected date: 05/09/2024  -     PROTIME-INR; Future; Expected date: 05/09/2024  -     amLODIPine (NORVASC) 10 MG tablet; Take 1 tablet (10 mg total) by mouth once daily.  Dispense: 90 tablet; Refill: 3  -     MICROALBUMIN / CREATININE RATIO URINE    3. Dyslipidemia  -     pravastatin (PRAVACHOL) 40 MG tablet; Take 1 tablet (40 mg total) by mouth once daily.  Dispense: 90 tablet; Refill: 3    4. Dysuria  -     POCT Urinalysis(Instrument)  -     Urine culture    5. TIA (transient ischemic attack)  -     PROTIME-INR; Future; Expected date: 05/09/2024        I spent 30 minutes on this encounter, time includes face-to-face, chart review, documentation, test review and orders.

## 2024-05-10 LAB
BACTERIA UR CULT: NORMAL
BACTERIA UR CULT: NORMAL

## 2024-05-18 ENCOUNTER — HOSPITAL ENCOUNTER (EMERGENCY)
Facility: HOSPITAL | Age: 74
Discharge: HOME | End: 2024-05-18
Attending: EMERGENCY MEDICINE
Payer: MEDICARE

## 2024-05-18 VITALS
OXYGEN SATURATION: 99 % | RESPIRATION RATE: 16 BRPM | SYSTOLIC BLOOD PRESSURE: 179 MMHG | HEART RATE: 67 BPM | TEMPERATURE: 98 F | DIASTOLIC BLOOD PRESSURE: 86 MMHG

## 2024-05-18 DIAGNOSIS — S49.92XA INJURY OF LEFT SHOULDER, INITIAL ENCOUNTER: Primary | ICD-10-CM
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
## 2024-05-18 PROCEDURE — 99283 EMERGENCY DEPT VISIT LOW MDM: CPT | Mod: 25

## 2024-05-18 RX ORDER — TRAMADOL HYDROCHLORIDE 50 MG/1
50 TABLET ORAL EVERY 6 HOURS PRN
Qty: 12 TABLET | Refills: 0 | Status: SHIPPED | OUTPATIENT
Start: 2024-05-18

## 2024-05-19 NOTE — ED PROVIDER NOTES
History      Chief Complaint   Patient presents with    Fall     Fell today in parking lot, stepped off curb and landed on concrete, injury to left arm and wrist,  denies hitting head or loss of consciousness, no blood thinners.       Review of patient's allergies indicates:   Allergen Reactions    Micardis [telmisartan]      Can't remember        HPI   HPI    5/19/2024, 3:05 PM   History obtained from the patient       History of Present Illness: Tali Fitzpatrick is a 73 y.o. female patient who presents to the Emergency Department for LUE pain since trip and fall pta.   Denies head injury or injury elsewhere.  No further complaints or concerns at this time.           PCP: Yobani Osborn MD       Past Medical History:  Past Medical History:   Diagnosis Date    Anxiety and depression     CKD (chronic kidney disease)     DJD (degenerative joint disease)     bilateral knees   Hyalgan series completed 8/27/14    Hyperlipidemia     Hypertension     Insomnia          Past Surgical History:  Past Surgical History:   Procedure Laterality Date    CATARACT EXTRACTION W/  INTRAOCULAR LENS IMPLANT      left    EYE SURGERY      LIPOSUCTION      RADIOFREQUENCY ABLATION Right 03/04/2022    Procedure: Radiofrequency Ablation;  Surgeon: Deon Ragland MD;  Location: [facility];  Service: Orthopedics;  Laterality: Right;  KNEE-Cooled    TONSILLECTOMY      TOTAL KNEE ARTHROPLASTY             Family History:  Family History   Problem Relation Name Age of Onset    Arthritis Mother      Coronary artery disease Mother      Coronary artery disease Father      Colon cancer Neg Hx      Breast cancer Neg Hx      Diabetes Neg Hx             Social History:  Social History     Tobacco Use    Smoking status: Never    Smokeless tobacco: Never   Substance and Sexual Activity    Alcohol use: Yes     Alcohol/week: 0.0 standard drinks of alcohol     Comment: social    Drug use: Yes     Types: Benzodiazepines    Sexual activity: Not on  file       ROS     Review of Systems   Constitutional:  Negative for fever.   Musculoskeletal:  Positive for arthralgias.   Neurological:  Negative for syncope.       Physical Exam      Initial Vitals [05/18/24 1607]   BP Pulse Resp Temp SpO2   (!) 179/86 67 16 98.1 °F (36.7 °C) 99 %      MAP       --         Physical Exam  Vital signs and nursing notes reviewed.  Constitutional: Patient is in NAD. Awake and alert. Well-developed and well-nourished.  Head: Atraumatic. Normocephalic.  Eyes:  EOM intact. Conjunctivae nl. No scleral icterus.  ENT: Mucous membranes are moist.   Neck: Supple.  No meningismus  Cardiovascular: Regular rate and rhythm. No murmurs, rubs, or gallops.   Pulmonary/Chest: No respiratory distress. Clear to auscultation bilaterally. No wheezing, rales, or rhonchi.  Abdominal: Soft. Non-distended. No TTP. No rebound, guarding, or rigidity. Good bowel sounds.  Genitourinary: No CVA tenderness  Musculoskeletal: Moves all extremities. No edema.  Tenderness to left shoulder, left elbow, left ulnar wrist.  Full range of motion of shoulder elbow and wrist.  2+ radial pulse. Normal sensation, and cap refill less than 2, to fingers x 5.   Skin: Warm and dry.  Neurological: Awake and alert. No acute focal neurological deficits are appreciated.  Psychiatric: Normal affect. Good eye contact. Appropriate in content.      ED Course          Procedures  ED Vital Signs:  Vitals:    05/18/24 1607   BP: (!) 179/86   Pulse: 67   Resp: 16   Temp: 98.1 °F (36.7 °C)   TempSrc: Oral   SpO2: 99%                 Imaging Results:  Imaging Results              X-Ray Shoulder Trauma Left (Final result)  Result time 05/18/24 17:01:15      Final result by Agatha Teran MD (05/18/24 17:01:15)                   Impression:    FINDINGS/  No acute fracture or dislocation seen three-view exam      Electronically signed by: Agatha Teran  Date:    05/18/2024  Time:    17:01               Narrative:    EXAMINATION:  XR  SHOULDER TRAUMA 3 VIEW LEFT    CLINICAL HISTORY:  Fall on same level from slipping, tripping and stumbling without subsequent striking against object, initial encounter    COMPARISON:  None                                       X-Ray Wrist Complete Left (Final result)  Result time 05/18/24 17:02:10      Final result by Agatha Trean MD (05/18/24 17:02:10)                   Impression:      No acute osseous finding      Electronically signed by: Agatha Teran  Date:    05/18/2024  Time:    17:02               Narrative:    EXAMINATION:  XR WRIST COMPLETE 3 VIEWS LEFT    CLINICAL HISTORY:  Fall on same level from slipping, tripping and stumbling without subsequent striking against object, initial encounter    COMPARISON:  None available    FINDINGS:  No acute fracture or dislocation seen four view exam.                                       X-Ray Elbow Complete Left (Final result)  Result time 05/18/24 17:03:02      Final result by Agatha Teran MD (05/18/24 17:03:02)                   Impression:      No acute fracture dislocation seen three-view exam      Electronically signed by: Agatha Teran  Date:    05/18/2024  Time:    17:03               Narrative:    EXAMINATION:  XR ELBOW COMPLETE 3 VIEW LEFT    CLINICAL HISTORY:  Fall on same level from slipping, tripping and stumbling without subsequent striking against object, initial encounter    TECHNIQUE:  AP, lateral, and oblique views of the left elbow were performed.    COMPARISON:  None                                         The Emergency Provider reviewed the vital signs and test results, which are outlined above.    ED Discussion             Medication(s) given in the ER:  Medications - No data to display         Follow-up Information       Yobani Osborn MD In 2 days.    Specialty: Family Medicine  Contact information:  [address] [place] [address] [id number] [phone]                                    Medication List        START  taking these medications      traMADoL 50 mg tablet  Commonly known as: ULTRAM  Take 1 tablet (50 mg total) by mouth every 6 (six) hours as needed.            ASK your doctor about these medications      amLODIPine 10 MG tablet  Commonly known as: NORVASC  Take 1 tablet (10 mg total) by mouth once daily.     atenoloL 50 MG tablet  Commonly known as: TENORMIN  Take 1 tablet (50 mg total) by mouth once daily.     busPIRone 30 MG Tab  Commonly known as: BUSPAR  Take 1 tablet (30 mg total) by mouth 2 (two) times daily. Morning and around 5:00 p.m..     CENTRUM SILVER ORAL     cyanocobalamin 1000 MCG tablet  Commonly known as: VITAMIN B-12     DULoxetine 20 MG capsule  Commonly known as: CYMBALTA  Take 1 capsule (20 mg total) by mouth once daily.     fluticasone propionate 50 mcg/actuation nasal spray  Commonly known as: FLONASE  2 sprays (100 mcg total) by Each Nostril route once daily.     hydrOXYzine pamoate 50 MG Cap  Commonly known as: VISTARIL  TAKE 1 CAPSULE EVERY 8 (EIGHT) HOURS AS NEEDED.     pravastatin 40 MG tablet  Commonly known as: PRAVACHOL  Take 1 tablet (40 mg total) by mouth once daily.     traZODone 50 MG tablet  Commonly known as: DESYREL  Take 1 tablet (50 mg total) by mouth every evening.               Where to Get Your Medications        These medications were sent to [facility][id number] - [place], [place] - [address] AT [facility]  [address], [place] [address]      Phone: [phone]   traMADoL 50 mg tablet             Medical Decision Making        All findings were reviewed with the patient/family in detail.   All remaining questions and concerns were addressed at that time.  Patient/family has been counseled regarding the need for follow-up as well as the indication to return to the emergency room should new or worrisome developments occur.        MDM     Amount and/or Complexity of Data Reviewed  Tests in the radiology section of CPT®: ordered and reviewed                    Clinical Impression:        ICD-10-CM ICD-9-CM   1. Injury of left shoulder, initial encounter  S49.92XA 959.2   2. Fall from slip, trip, or stumble, initial encounter  W01.0XXA E885.9               Mimi Amaya, PACristopherC  05/19/24 1537

## 2024-05-23 ENCOUNTER — CLINICAL SUPPORT (OUTPATIENT)
Dept: PRIMARY CARE CLINIC | Facility: CLINIC | Age: 74
End: 2024-05-23
Payer: MEDICARE

## 2024-05-23 VITALS — SYSTOLIC BLOOD PRESSURE: 128 MMHG | OXYGEN SATURATION: 98 % | DIASTOLIC BLOOD PRESSURE: 72 MMHG | HEART RATE: 73 BPM

## 2024-05-23 DIAGNOSIS — I10 HYPERTENSION, UNSPECIFIED TYPE: Primary | ICD-10-CM

## 2024-05-23 DIAGNOSIS — N39.0 URINARY TRACT INFECTION WITHOUT HEMATURIA, SITE UNSPECIFIED: Primary | ICD-10-CM

## 2024-05-23 NOTE — PROGRESS NOTES
Tali Fitzpatrick 74 y.o. female is here today for Blood Pressure check.   History of HTN yes.    Review of patient's allergies indicates:   Allergen Reactions    Micardis [telmisartan]      Can't remember     Creatinine   Date Value Ref Range Status   05/09/2024 1.3 0.5 - 1.4 mg/dL Final     Sodium   Date Value Ref Range Status   05/09/2024 139 136 - 145 mmol/L Final     Potassium   Date Value Ref Range Status   05/09/2024 3.9 3.5 - 5.1 mmol/L Final   ]  Patient verifies taking blood pressure medications on a regular basis at the same time of the day.     Current Outpatient Medications:     amLODIPine (NORVASC) 10 MG tablet, Take 1 tablet (10 mg total) by mouth once daily., Disp: 90 tablet, Rfl: 3    atenoloL (TENORMIN) 50 MG tablet, Take 1 tablet (50 mg total) by mouth once daily., Disp: 90 tablet, Rfl: 3    busPIRone (BUSPAR) 30 MG Tab, Take 1 tablet (30 mg total) by mouth 2 (two) times daily. Morning and around 5:00 p.m.., Disp: 60 tablet, Rfl: 2    cyanocobalamin (VITAMIN B-12) 1000 MCG tablet, Take 1,000 mcg by mouth once daily., Disp: , Rfl:     DULoxetine (CYMBALTA) 20 MG capsule, Take 1 capsule (20 mg total) by mouth once daily., Disp: 30 capsule, Rfl: 2    fluticasone propionate (FLONASE) 50 mcg/actuation nasal spray, 2 sprays (100 mcg total) by Each Nostril route once daily., Disp: 16 g, Rfl: 11    folic acid/multivit-min/lutein (CENTRUM SILVER ORAL), Take 1 tablet by mouth once daily., Disp: , Rfl:     hydrOXYzine pamoate (VISTARIL) 50 MG Cap, TAKE 1 CAPSULE EVERY 8 (EIGHT) HOURS AS NEEDED., Disp: 120 capsule, Rfl: 2    pravastatin (PRAVACHOL) 40 MG tablet, Take 1 tablet (40 mg total) by mouth once daily., Disp: 90 tablet, Rfl: 3    traMADoL (ULTRAM) 50 mg tablet, Take 1 tablet (50 mg total) by mouth every 6 (six) hours as needed., Disp: 12 tablet, Rfl: 0    traZODone (DESYREL) 50 MG tablet, Take 1 tablet (50 mg total) by mouth every evening., Disp: 30 tablet, Rfl: 2  Does patient have record of home blood  pressure readings no. Readings have been averaging 160/80.   Last dose of blood pressure medication was taken at 5/23/24 at 9 AM.  Patient is asymptomatic.   Complains of N/A.      128/72 , 73  .    Blood pressure reading after 15 minutes was 128/72, Pulse 73.  Dr. Gurjit Perez notified.

## 2024-05-30 ENCOUNTER — OFFICE VISIT (OUTPATIENT)
Dept: OPHTHALMOLOGY | Facility: CLINIC | Age: 74
End: 2024-05-30
Payer: MEDICARE

## 2024-05-30 DIAGNOSIS — H35.30 AGE-RELATED MACULAR DEGENERATION: Primary | ICD-10-CM

## 2024-05-30 DIAGNOSIS — H52.203 MYOPIA WITH ASTIGMATISM AND PRESBYOPIA, BILATERAL: ICD-10-CM

## 2024-05-30 DIAGNOSIS — Z96.1 BILATERAL PSEUDOPHAKIA: ICD-10-CM

## 2024-05-30 DIAGNOSIS — H26.491 POSTERIOR CAPSULAR OPACIFICATION, RIGHT: ICD-10-CM

## 2024-05-30 DIAGNOSIS — H40.023 OAG (OPEN ANGLE GLAUCOMA) SUSPECT, HIGH RISK, BILATERAL: ICD-10-CM

## 2024-05-30 DIAGNOSIS — H52.13 MYOPIA WITH ASTIGMATISM AND PRESBYOPIA, BILATERAL: ICD-10-CM

## 2024-05-30 DIAGNOSIS — H52.4 MYOPIA WITH ASTIGMATISM AND PRESBYOPIA, BILATERAL: ICD-10-CM

## 2024-05-30 PROCEDURE — 92133 CPTRZD OPH DX IMG PST SGM ON: CPT | Mod: S$GLB,,, | Performed by: OPTOMETRIST

## 2024-05-30 PROCEDURE — 99999 PR PBB SHADOW E&M-EST. PATIENT-LVL III: CPT | Mod: PBBFAC,,, | Performed by: OPTOMETRIST

## 2024-05-30 PROCEDURE — 99204 OFFICE O/P NEW MOD 45 MIN: CPT | Mod: S$GLB,,, | Performed by: OPTOMETRIST

## 2024-05-30 PROCEDURE — 1159F MED LIST DOCD IN RCRD: CPT | Mod: CPTII,S$GLB,, | Performed by: OPTOMETRIST

## 2024-05-30 NOTE — PROGRESS NOTES
HPI    1. Hx of TIAs last one was 2020  2. Dx of macular degeneration 2021  3. PCIOL OU 2021    Vision changes since last eye exam?: Pt has been experiencing worsening   vision OU. Pt has been using progressive eyeglasses full time with out   issue and is using Systane BID OU.      Any eye pain today: No.    Other ocular symptoms: Pt has been experiencing flashes of light and   visual distortion of objects per pt.     Interested in contact lens fitting today? No.     Last edited by Carla Monroy on 5/30/2024 10:02 AM.            Assessment /Plan     For exam results, see Encounter Report.      1. Age-related macular degeneration  -     Ambulatory referral/consult to Ophthalmology; Future; Expected date: 06/06/2024  OD - SRF     OS - no SRF    Consult Dr Gudino for retinal eval next available    2. OAG (open angle glaucoma) suspect, high risk, bilateral  The patient has the following Glaucoma risk factors: Optic Nerve Asymmetry , Age, Race     Discussed observation off drops at this time for changes.       3. Myopia with astigmatism and presbyopia, bilateral  Continue current Mrx.     4. Posterior capsular opacification, right  Observe    5. Bilateral pseudophakia  Observe    RTC with Dr SAN for retinal consult.

## 2024-05-31 ENCOUNTER — OFFICE VISIT (OUTPATIENT)
Dept: OPHTHALMOLOGY | Facility: CLINIC | Age: 74
End: 2024-05-31
Payer: MEDICARE

## 2024-05-31 DIAGNOSIS — H52.4 MYOPIA WITH ASTIGMATISM AND PRESBYOPIA, BILATERAL: ICD-10-CM

## 2024-05-31 DIAGNOSIS — H40.023 OAG (OPEN ANGLE GLAUCOMA) SUSPECT, HIGH RISK, BILATERAL: ICD-10-CM

## 2024-05-31 DIAGNOSIS — H35.3132 INTERMEDIATE STAGE NONEXUDATIVE AGE-RELATED MACULAR DEGENERATION OF BOTH EYES: Primary | ICD-10-CM

## 2024-05-31 DIAGNOSIS — H52.13 MYOPIA WITH ASTIGMATISM AND PRESBYOPIA, BILATERAL: ICD-10-CM

## 2024-05-31 DIAGNOSIS — H26.491 POSTERIOR CAPSULAR OPACIFICATION, RIGHT: ICD-10-CM

## 2024-05-31 DIAGNOSIS — Z96.1 BILATERAL PSEUDOPHAKIA: ICD-10-CM

## 2024-05-31 DIAGNOSIS — H52.203 MYOPIA WITH ASTIGMATISM AND PRESBYOPIA, BILATERAL: ICD-10-CM

## 2024-05-31 PROCEDURE — 92134 CPTRZ OPH DX IMG PST SGM RTA: CPT | Mod: S$GLB,,, | Performed by: OPHTHALMOLOGY

## 2024-05-31 PROCEDURE — 1160F RVW MEDS BY RX/DR IN RCRD: CPT | Mod: CPTII,S$GLB,, | Performed by: OPHTHALMOLOGY

## 2024-05-31 PROCEDURE — 99203 OFFICE O/P NEW LOW 30 MIN: CPT | Mod: S$GLB,,, | Performed by: OPHTHALMOLOGY

## 2024-05-31 PROCEDURE — 1159F MED LIST DOCD IN RCRD: CPT | Mod: CPTII,S$GLB,, | Performed by: OPHTHALMOLOGY

## 2024-05-31 PROCEDURE — 99999 PR PBB SHADOW E&M-EST. PATIENT-LVL I: CPT | Mod: PBBFAC,,, | Performed by: OPHTHALMOLOGY

## 2024-05-31 NOTE — PROGRESS NOTES
===============================  Date today is 5/31/2024  Tali Fitzpatrick is a 74 y.o. female  Last visit [facility]: :Visit date not found   Last visit eye dept. Visit date not found    Corrected distance visual acuity was 20/25 in the right eye and 20/60 in the left eye.  Tonometry       Tonometry (Applanation, 11:26 AM)         Right Left    Pressure 10 10                  Not recorded       Not recorded       Not recorded       Chief Complaint   Patient presents with    Follow-up      AMD OU       HPI     Follow-up            Comments:  AMD OU            Comments    1. PCIOL 2021 at [facility]  2. Dx macular degeneration 2021  3. Glaucoma suspect, low risk  -GCL 30/28 05/30/2024             Last edited by Marisela Curiel on 5/31/2024 11:19 AM.      Problem List Items Addressed This Visit    None  Visit Diagnoses       Intermediate stage nonexudative age-related macular degeneration of both eyes    -  Primary    Relevant Orders    Posterior Segment OCT Retina-Both eyes (Completed)    Color Fundus Photography - OU - Both Eyes (Completed)    OAG (open angle glaucoma) suspect, high risk, bilateral        Myopia with astigmatism and presbyopia, bilateral        Posterior capsular opacification, right        Bilateral pseudophakia              Instructed to call 24/7 for any worsening of vision, visual distortion or pain.  Check OU independently daily.    Gave my office and personal cell phone number.  ________________  5/31/2024 today  Tali Fitzpatrick  :  OD increased clivus  No sign of SRN  Shaggy but even RPE  Follow for now    PCIOL OU with PCM OD  Consider YAG once RPED collapse OD  Amsler/AREDS discussed    RTC 12 weeks recheck OD  Instructed to call 24/7 for any worsening of vision or symptoms. Check OU daily.   Gave my office and cell phone number.      =============================

## 2024-06-12 LAB — NONINV COLON CA DNA+OCC BLD SCRN STL QL: NEGATIVE

## 2024-06-14 ENCOUNTER — CLINICAL SUPPORT (OUTPATIENT)
Dept: PRIMARY CARE CLINIC | Facility: CLINIC | Age: 74
End: 2024-06-14
Payer: MEDICARE

## 2024-06-14 DIAGNOSIS — N39.0 URINARY TRACT INFECTION WITHOUT HEMATURIA, SITE UNSPECIFIED: ICD-10-CM

## 2024-06-14 PROCEDURE — 87086 URINE CULTURE/COLONY COUNT: CPT | Performed by: PHYSICIAN ASSISTANT

## 2024-06-15 LAB
BACTERIA UR CULT: NORMAL
BACTERIA UR CULT: NORMAL

## 2024-06-17 ENCOUNTER — TELEPHONE (OUTPATIENT)
Dept: PRIMARY CARE CLINIC | Facility: CLINIC | Age: 74
End: 2024-06-17
Payer: MEDICARE

## 2024-06-17 NOTE — TELEPHONE ENCOUNTER
Pt aware of urine culture results. States she will come by clinic to do a repeat. No other concerns were voiced

## 2024-06-17 NOTE — TELEPHONE ENCOUNTER
----- Message from Gurjit Perez III, PA-C sent at 6/17/2024  9:10 AM CDT -----  Tali Fitzpatrick    The urine exam shows multiple bacteria. If patient is still having symptoms of an UTI, I recommend a repeat UA with improved collection method

## 2024-08-06 ENCOUNTER — OFFICE VISIT (OUTPATIENT)
Dept: PRIMARY CARE CLINIC | Facility: CLINIC | Age: 74
End: 2024-08-06
Payer: MEDICARE

## 2024-08-06 ENCOUNTER — TELEPHONE (OUTPATIENT)
Dept: PSYCHIATRY | Facility: CLINIC | Age: 74
End: 2024-08-06
Payer: MEDICARE

## 2024-08-06 VITALS
WEIGHT: 189.63 LBS | BODY MASS INDEX: 29.69 KG/M2 | SYSTOLIC BLOOD PRESSURE: 120 MMHG | DIASTOLIC BLOOD PRESSURE: 66 MMHG | HEART RATE: 81 BPM | RESPIRATION RATE: 18 BRPM | OXYGEN SATURATION: 97 %

## 2024-08-06 DIAGNOSIS — N18.32 STAGE 3B CHRONIC KIDNEY DISEASE: ICD-10-CM

## 2024-08-06 DIAGNOSIS — I10 PRIMARY HYPERTENSION: Primary | ICD-10-CM

## 2024-08-06 PROCEDURE — 3074F SYST BP LT 130 MM HG: CPT | Mod: CPTII,,, | Performed by: PHYSICIAN ASSISTANT

## 2024-08-06 PROCEDURE — 1101F PT FALLS ASSESS-DOCD LE1/YR: CPT | Mod: CPTII,,, | Performed by: PHYSICIAN ASSISTANT

## 2024-08-06 PROCEDURE — 1126F AMNT PAIN NOTED NONE PRSNT: CPT | Mod: CPTII,,, | Performed by: PHYSICIAN ASSISTANT

## 2024-08-06 PROCEDURE — 99214 OFFICE O/P EST MOD 30 MIN: CPT | Mod: ,,, | Performed by: PHYSICIAN ASSISTANT

## 2024-08-06 PROCEDURE — 3078F DIAST BP <80 MM HG: CPT | Mod: CPTII,,, | Performed by: PHYSICIAN ASSISTANT

## 2024-08-06 PROCEDURE — 1159F MED LIST DOCD IN RCRD: CPT | Mod: CPTII,,, | Performed by: PHYSICIAN ASSISTANT

## 2024-08-06 PROCEDURE — 1160F RVW MEDS BY RX/DR IN RCRD: CPT | Mod: CPTII,,, | Performed by: PHYSICIAN ASSISTANT

## 2024-08-06 PROCEDURE — 3288F FALL RISK ASSESSMENT DOCD: CPT | Mod: CPTII,,, | Performed by: PHYSICIAN ASSISTANT

## 2024-08-06 RX ORDER — FLUTICASONE PROPIONATE 50 MCG
2 SPRAY, SUSPENSION (ML) NASAL DAILY
Qty: 16 G | Refills: 11 | Status: SHIPPED | OUTPATIENT
Start: 2024-08-06

## 2024-08-08 ENCOUNTER — OFFICE VISIT (OUTPATIENT)
Dept: PSYCHIATRY | Facility: CLINIC | Age: 74
End: 2024-08-08
Payer: MEDICARE

## 2024-08-08 VITALS — DIASTOLIC BLOOD PRESSURE: 75 MMHG | HEART RATE: 74 BPM | SYSTOLIC BLOOD PRESSURE: 129 MMHG

## 2024-08-08 DIAGNOSIS — F41.1 GAD (GENERALIZED ANXIETY DISORDER): Primary | ICD-10-CM

## 2024-08-08 DIAGNOSIS — F33.9 RECURRENT MAJOR DEPRESSIVE DISORDER, REMISSION STATUS UNSPECIFIED: ICD-10-CM

## 2024-08-08 DIAGNOSIS — G47.00 INSOMNIA, UNSPECIFIED TYPE: ICD-10-CM

## 2024-08-08 PROCEDURE — 99999 PR PBB SHADOW E&M-EST. PATIENT-LVL I: CPT | Mod: PBBFAC,,, | Performed by: PSYCHIATRY & NEUROLOGY

## 2024-08-08 PROCEDURE — 3074F SYST BP LT 130 MM HG: CPT | Mod: CPTII,S$GLB,, | Performed by: PSYCHIATRY & NEUROLOGY

## 2024-08-08 PROCEDURE — 1159F MED LIST DOCD IN RCRD: CPT | Mod: CPTII,S$GLB,, | Performed by: PSYCHIATRY & NEUROLOGY

## 2024-08-08 PROCEDURE — 3078F DIAST BP <80 MM HG: CPT | Mod: CPTII,S$GLB,, | Performed by: PSYCHIATRY & NEUROLOGY

## 2024-08-08 PROCEDURE — 1160F RVW MEDS BY RX/DR IN RCRD: CPT | Mod: CPTII,S$GLB,, | Performed by: PSYCHIATRY & NEUROLOGY

## 2024-08-08 PROCEDURE — 99214 OFFICE O/P EST MOD 30 MIN: CPT | Mod: S$GLB,,, | Performed by: PSYCHIATRY & NEUROLOGY

## 2024-08-08 RX ORDER — BUSPIRONE HYDROCHLORIDE 30 MG/1
30 TABLET ORAL 2 TIMES DAILY
Qty: 180 TABLET | Refills: 1 | Status: SHIPPED | OUTPATIENT
Start: 2024-08-08

## 2024-08-08 RX ORDER — DULOXETIN HYDROCHLORIDE 20 MG/1
20 CAPSULE, DELAYED RELEASE ORAL DAILY
Qty: 90 CAPSULE | Refills: 1 | Status: SHIPPED | OUTPATIENT
Start: 2024-08-08

## 2024-08-08 RX ORDER — TRAZODONE HYDROCHLORIDE 50 MG/1
50 TABLET ORAL NIGHTLY
Qty: 90 TABLET | Refills: 1 | Status: SHIPPED | OUTPATIENT
Start: 2024-08-08

## 2024-08-23 ENCOUNTER — OFFICE VISIT (OUTPATIENT)
Dept: OPHTHALMOLOGY | Facility: CLINIC | Age: 74
End: 2024-08-23
Payer: MEDICARE

## 2024-08-23 DIAGNOSIS — H35.30 AGE-RELATED MACULAR DEGENERATION: ICD-10-CM

## 2024-08-23 DIAGNOSIS — Z96.1 BILATERAL PSEUDOPHAKIA: ICD-10-CM

## 2024-08-23 DIAGNOSIS — H35.3132 INTERMEDIATE STAGE NONEXUDATIVE AGE-RELATED MACULAR DEGENERATION OF BOTH EYES: Primary | ICD-10-CM

## 2024-08-23 DIAGNOSIS — H40.023 OAG (OPEN ANGLE GLAUCOMA) SUSPECT, HIGH RISK, BILATERAL: ICD-10-CM

## 2024-08-23 PROCEDURE — 99999 PR PBB SHADOW E&M-EST. PATIENT-LVL III: CPT | Mod: PBBFAC,,, | Performed by: OPHTHALMOLOGY

## 2024-08-23 NOTE — PROGRESS NOTES
===============================  Date today is 8/23/2024  Tali Fitzpatrick is a 74 y.o. female  Last visit [facility]: :5/31/2024   Last visit eye dept. 5/31/2024    Uncorrected distance visual acuity was 20/400 in the right eye and 20/200 in the left eye. Corrected distance visual acuity was 20/30 in the right eye and 20/30 in the left eye.  Tonometry       Tonometry (Applanation, 10:39 AM)         Right Left    Pressure 15 13                  Wearing Rx       Wearing Rx         Sphere Cylinder Axis Add    Right -3.50 +1.00 080 +2.50    Left -2.25 +0.50 085 +2.50      Type: PAL                  Not recorded       Not recorded       Chief Complaint   Patient presents with    Macular Degeneration     12 week RPED OD     HPI     Macular Degeneration            Comments: 12 week RPED OD          Comments    1. PCIOL 2021 at [facility]  2. Dx macular degeneration 2021  3. Glaucoma suspect, low risk  -GCL 30/28 05/30/2024             Last edited by Gracia Luecro on 8/23/2024 10:16 AM.      Problem List Items Addressed This Visit    None  Visit Diagnoses       Intermediate stage nonexudative age-related macular degeneration of both eyes    -  Primary    Relevant Orders    Posterior Segment OCT Retina-Both eyes (Completed)    Age-related macular degeneration        Relevant Orders    Posterior Segment OCT Retina-Both eyes (Completed)    OAG (open angle glaucoma) suspect, high risk, bilateral        Bilateral pseudophakia              Instructed to call 24/7 for any worsening of vision, visual distortion or pain.  Check OU independently daily.    Gave my office and personal cell phone number.  ________________  8/23/2024 today  Tali Fitzpatrick        OD RPED  Much better on OCT today, resolved OD, OS ok    RPE mottling OU  SCOAG based on c/d  Vision stable  Ok to follow with Dr. Garcia yearly    RTC 1 year with Dr. Garcia  Instructed to call 24/7 for any worsening of vision or symptoms. Check OU daily.   Gave my  office and cell phone number.    =============================

## 2024-09-17 ENCOUNTER — OFFICE VISIT (OUTPATIENT)
Dept: PRIMARY CARE CLINIC | Facility: CLINIC | Age: 74
End: 2024-09-17
Payer: MEDICARE

## 2024-09-17 VITALS
BODY MASS INDEX: 29.94 KG/M2 | WEIGHT: 191.13 LBS | OXYGEN SATURATION: 95 % | RESPIRATION RATE: 18 BRPM | DIASTOLIC BLOOD PRESSURE: 82 MMHG | SYSTOLIC BLOOD PRESSURE: 136 MMHG | HEART RATE: 88 BPM

## 2024-09-17 DIAGNOSIS — R29.818 OTHER SYMPTOMS AND SIGNS INVOLVING THE NERVOUS SYSTEM: Primary | ICD-10-CM

## 2024-09-17 DIAGNOSIS — M54.12 CERVICAL RADICULAR PAIN: ICD-10-CM

## 2024-09-17 DIAGNOSIS — M79.642 HAND PAIN, LEFT: ICD-10-CM

## 2024-09-17 DIAGNOSIS — G45.9 TIA (TRANSIENT ISCHEMIC ATTACK): ICD-10-CM

## 2024-09-17 PROCEDURE — 1101F PT FALLS ASSESS-DOCD LE1/YR: CPT | Mod: CPTII,S$GLB,, | Performed by: PHYSICIAN ASSISTANT

## 2024-09-17 PROCEDURE — 99214 OFFICE O/P EST MOD 30 MIN: CPT | Mod: S$GLB,,, | Performed by: PHYSICIAN ASSISTANT

## 2024-09-17 PROCEDURE — 1125F AMNT PAIN NOTED PAIN PRSNT: CPT | Mod: CPTII,S$GLB,, | Performed by: PHYSICIAN ASSISTANT

## 2024-09-17 PROCEDURE — 3075F SYST BP GE 130 - 139MM HG: CPT | Mod: CPTII,S$GLB,, | Performed by: PHYSICIAN ASSISTANT

## 2024-09-17 PROCEDURE — 1159F MED LIST DOCD IN RCRD: CPT | Mod: CPTII,S$GLB,, | Performed by: PHYSICIAN ASSISTANT

## 2024-09-17 PROCEDURE — 3288F FALL RISK ASSESSMENT DOCD: CPT | Mod: CPTII,S$GLB,, | Performed by: PHYSICIAN ASSISTANT

## 2024-09-17 PROCEDURE — 3079F DIAST BP 80-89 MM HG: CPT | Mod: CPTII,S$GLB,, | Performed by: PHYSICIAN ASSISTANT

## 2024-09-17 RX ORDER — GABAPENTIN 300 MG/1
300 CAPSULE ORAL 3 TIMES DAILY
Qty: 90 CAPSULE | Refills: 1 | Status: SHIPPED | OUTPATIENT
Start: 2024-09-17 | End: 2025-09-17

## 2024-09-17 NOTE — PROGRESS NOTES
Subjective     Patient ID: Tali Fitzpatrick is a 74 y.o. female.    Chief Complaint: Hand Pain    Tali is a 73 y/o with a hx of HTN and HLD presents left hand swelling, numbness, tingling, and weakness. She reports the sxs are typically constant and denies any worsen sxs at night. Patient states she has been very stressed recently d/t the passing of her  but the sxs were present prior to the passing. She denies any recent injuries, falls, bites or stings to the affected hand. Denies any left arm pain, neck pain, left arm swelling, chest pain, and SOB. No new medications. No other concerns reported at this time.    Hand Pain       Review of Systems   Constitutional:  Negative for activity change and fatigue.   Musculoskeletal:  Positive for joint swelling and joint deformity. Negative for back pain, leg pain, neck pain and neck stiffness.   Neurological:  Negative for syncope.   All other systems reviewed and are negative.    Past Medical History:   Diagnosis Date    Anxiety and depression     CKD (chronic kidney disease)     DJD (degenerative joint disease)     bilateral knees   Hyalgan series completed 8/27/14    Hyperlipidemia     Hypertension     Insomnia        Past Surgical History:   Procedure Laterality Date    CATARACT EXTRACTION W/  INTRAOCULAR LENS IMPLANT      left    EYE SURGERY      LIPOSUCTION      RADIOFREQUENCY ABLATION Right 03/04/2022    Procedure: Radiofrequency Ablation;  Surgeon: Deon Ragland MD;  Location: [facility];  Service: Orthopedics;  Laterality: Right;  KNEE-Cooled    TONSILLECTOMY      TOTAL KNEE ARTHROPLASTY         Family History   Problem Relation Name Age of Onset    Arthritis Mother      Coronary artery disease Mother      Coronary artery disease Father      Colon cancer Neg Hx      Breast cancer Neg Hx      Diabetes Neg Hx         Social History     Socioeconomic History    Marital status:     Number of children: 1   Occupational History    Occupation:  Retired   Tobacco Use    Smoking status: Never    Smokeless tobacco: Never   Substance and Sexual Activity    Alcohol use: Yes     Alcohol/week: 0.0 standard drinks of alcohol     Comment: social    Drug use: Yes     Types: Benzodiazepines       Review of patient's allergies indicates:   Allergen Reactions    Micardis [telmisartan]      Can't remember         Current Outpatient Medications:     amLODIPine (NORVASC) 10 MG tablet, Take 1 tablet (10 mg total) by mouth once daily., Disp: 90 tablet, Rfl: 3    atenoloL (TENORMIN) 50 MG tablet, Take 1 tablet (50 mg total) by mouth once daily., Disp: 90 tablet, Rfl: 3    busPIRone (BUSPAR) 30 MG Tab, Take 1 tablet (30 mg total) by mouth 2 (two) times daily. Morning and evening., Disp: 180 tablet, Rfl: 1    cyanocobalamin (VITAMIN B-12) 1000 MCG tablet, Take 1,000 mcg by mouth once daily., Disp: , Rfl:     DULoxetine (CYMBALTA) 20 MG capsule, Take 1 capsule (20 mg total) by mouth once daily., Disp: 90 capsule, Rfl: 1    fluticasone propionate (FLONASE) 50 mcg/actuation nasal spray, 2 sprays (100 mcg total) by Each Nostril route once daily., Disp: 16 g, Rfl: 11    folic acid/multivit-min/lutein (CENTRUM SILVER ORAL), Take 1 tablet by mouth once daily., Disp: , Rfl:     hydrOXYzine pamoate (VISTARIL) 50 MG Cap, TAKE 1 CAPSULE EVERY 8 (EIGHT) HOURS AS NEEDED., Disp: 120 capsule, Rfl: 2    pravastatin (PRAVACHOL) 40 MG tablet, Take 1 tablet (40 mg total) by mouth once daily., Disp: 90 tablet, Rfl: 3    traZODone (DESYREL) 50 MG tablet, Take 1 tablet (50 mg total) by mouth nightly., Disp: 90 tablet, Rfl: 1    gabapentin (NEURONTIN) 300 MG capsule, Take 1 capsule (300 mg total) by mouth 3 (three) times daily., Disp: 90 capsule, Rfl: 1    /82 (BP Location: Left arm, Patient Position: Sitting, BP Method: Large (Manual))   Pulse 88   Resp 18   Wt 86.7 kg (191 lb 2.2 oz)   SpO2 95%   BMI 29.94 kg/m²        Objective     Physical Exam  Vitals and nursing note reviewed.    Constitutional:       Appearance: Normal appearance.   HENT:      Head: Normocephalic and atraumatic.   Eyes:      Conjunctiva/sclera: Conjunctivae normal.   Cardiovascular:      Rate and Rhythm: Normal rate and regular rhythm.   Pulmonary:      Effort: Pulmonary effort is normal.      Breath sounds: Normal breath sounds.   Musculoskeletal:         General: Normal range of motion.      Cervical back: Normal range of motion and neck supple.   Skin:     General: Skin is warm.      Capillary Refill: Capillary refill takes less than 2 seconds.   Neurological:      General: No focal deficit present.      Mental Status: She is alert and oriented to person, place, and time. Mental status is at baseline.   Psychiatric:         Mood and Affect: Mood normal.         Behavior: Behavior normal.         Thought Content: Thought content normal.         Judgment: Judgment normal.            Assessment and Plan     1. Other symptoms and signs involving the nervous system  -     MRI Brain W WO Contrast; Future; Expected date: 09/17/2024  -     Creatinine, serum; Future; Expected date: 09/17/2024    2. Cervical radicular pain  -     MRI Cervical Spine Without Contrast; Future; Expected date: 09/17/2024  -     Creatinine, serum; Future; Expected date: 09/17/2024    3. Hand pain, left  -     Ambulatory referral/consult to Orthopedics; Future; Expected date: 09/24/2024    Other orders  -     gabapentin (NEURONTIN) 300 MG capsule; Take 1 capsule (300 mg total) by mouth 3 (three) times daily.  Dispense: 90 capsule; Refill: 1        I spent 30 minutes on this encounter, time includes face-to-face, chart review, documentation, test review and orders.          Hola Perez PA-C

## 2024-09-22 ENCOUNTER — HOSPITAL ENCOUNTER (OUTPATIENT)
Dept: RADIOLOGY | Facility: CLINIC | Age: 74
Discharge: HOME | End: 2024-09-22
Attending: PHYSICIAN ASSISTANT
Payer: MEDICARE

## 2024-09-22 ENCOUNTER — OFFICE VISIT (OUTPATIENT)
Dept: URGENT CARE | Facility: CLINIC | Age: 74
End: 2024-09-22
Payer: MEDICARE

## 2024-09-22 VITALS
TEMPERATURE: 98 F | BODY MASS INDEX: 30.47 KG/M2 | HEART RATE: 74 BPM | RESPIRATION RATE: 18 BRPM | DIASTOLIC BLOOD PRESSURE: 67 MMHG | WEIGHT: 194.13 LBS | SYSTOLIC BLOOD PRESSURE: 141 MMHG | OXYGEN SATURATION: 96 % | HEIGHT: 67 IN

## 2024-09-22 DIAGNOSIS — R09.82 POST-NASAL DRIP: ICD-10-CM

## 2024-09-22 DIAGNOSIS — R05.9 COUGH, UNSPECIFIED TYPE: ICD-10-CM

## 2024-09-22 DIAGNOSIS — R05.9 COUGH, UNSPECIFIED TYPE: Primary | ICD-10-CM

## 2024-09-22 LAB
CTP QC/QA: YES
SARS-COV-2 AG RESP QL IA.RAPID: NEGATIVE

## 2024-09-22 PROCEDURE — 71046 X-RAY EXAM CHEST 2 VIEWS: CPT | Mod: S$GLB,,, | Performed by: RADIOLOGY

## 2024-09-22 PROCEDURE — 87811 SARS-COV-2 COVID19 W/OPTIC: CPT | Mod: QW,S$GLB,, | Performed by: PHYSICIAN ASSISTANT

## 2024-09-22 PROCEDURE — 99214 OFFICE O/P EST MOD 30 MIN: CPT | Mod: S$GLB,,, | Performed by: PHYSICIAN ASSISTANT

## 2024-09-22 RX ORDER — METHYLPREDNISOLONE 4 MG/1
TABLET ORAL
Qty: 21 EACH | Refills: 0 | Status: SHIPPED | OUTPATIENT
Start: 2024-09-22 | End: 2024-10-13

## 2024-09-22 RX ORDER — BROMPHENIRAMINE MALEATE, PSEUDOEPHEDRINE HYDROCHLORIDE, AND DEXTROMETHORPHAN HYDROBROMIDE 2; 30; 10 MG/5ML; MG/5ML; MG/5ML
10 SYRUP ORAL EVERY 6 HOURS PRN
Qty: 118 ML | Refills: 0 | Status: SHIPPED | OUTPATIENT
Start: 2024-09-22 | End: 2024-10-02

## 2024-09-22 RX ORDER — PROMETHAZINE HYDROCHLORIDE AND DEXTROMETHORPHAN HYDROBROMIDE 6.25; 15 MG/5ML; MG/5ML
10 SYRUP ORAL NIGHTLY PRN
Qty: 118 ML | Refills: 0 | Status: SHIPPED | OUTPATIENT
Start: 2024-09-22

## 2024-09-22 NOTE — PROGRESS NOTES
"Subjective:      Patient ID: Tali Fitzpatrick is a 74 y.o. female.    Vitals:  height is 5' 7" (1.702 m) and weight is 88 kg (194 lb 1.8 oz). Her oral temperature is 98.2 °F (36.8 °C). Her blood pressure is 141/67 (abnormal) and her pulse is 74. Her respiration is 18 and oxygen saturation is 96%.     Chief Complaint: Cough    PT has bad cough and nasal drip, weakness and poor balance aslmost fell 4 times coming from home to urgent care X 3 days for the cough and post nasal drip apprx. 3 weeks     Cough  This is a new problem. The current episode started in the past 7 days. The problem has been gradually worsening. The problem occurs every few minutes. The cough is Productive of sputum. Associated symptoms include nasal congestion, postnasal drip and wheezing. Nothing aggravates the symptoms. Treatments tried: nitequil, zyzal, black seed oil. The treatment provided no relief.       HENT:  Positive for postnasal drip.    Respiratory:  Positive for cough and wheezing.       Objective:     Physical Exam   Constitutional: She is oriented to person, place, and time. She appears well-developed.   HENT:   Head: Normocephalic and atraumatic.   Ears:   Right Ear: External ear normal.   Left Ear: External ear normal.   Nose: Nose normal.   Mouth/Throat: Oropharynx is clear and moist.   Eyes: Conjunctivae, EOM and lids are normal.   Neck: Trachea normal and phonation normal. Neck supple.   Cardiovascular: Normal rate.   Pulmonary/Chest: Effort normal. No respiratory distress. She has no wheezes. She has no rhonchi.   Musculoskeletal: Normal range of motion.         General: Normal range of motion.   Neurological: She is alert and oriented to person, place, and time.   Skin: Skin is warm, dry and intact.   Psychiatric: Her speech is normal and behavior is normal. Judgment and thought content normal.   Nursing note and vitals reviewed.      Assessment:     1. Cough, unspecified type    2. Post-nasal drip      Results for orders " placed or performed in visit on 09/22/24   SARS Coronavirus 2 Antigen, POCT Manual Read   Result Value Ref Range    SARS Coronavirus 2 Antigen Negative Negative     Acceptable Yes      XR result:  1.  Negative for acute process involving the chest.  2.  Stable findings as noted above.  Plan:   VSS. Patient non-toxic appearing. Discussed medication being prescribed.  Advised patient to follow up with PCP as needed.  Patient verbalized understanding, agrees with the plan, and is comfortable with discharge.      Cough, unspecified type  -     SARS Coronavirus 2 Antigen, POCT Manual Read  -     XR CHEST PA AND LATERAL; Future; Expected date: 09/22/2024  -     promethazine-dextromethorphan (PROMETHAZINE-DM) 6.25-15 mg/5 mL Syrp; Take 10 mLs by mouth nightly as needed (cough).  Dispense: 118 mL; Refill: 0  -     brompheniramine-pseudoeph-DM (BROMFED DM) 2-30-10 mg/5 mL Syrp; Take 10 mLs by mouth every 6 (six) hours as needed (cough).  Dispense: 118 mL; Refill: 0  -     methylPREDNISolone (MEDROL DOSEPACK) 4 mg tablet; use as directed  Dispense: 21 each; Refill: 0    Post-nasal drip      Medical Decision Making:   Clinical Tests:   Lab Tests: Ordered and Reviewed       <> Summary of Lab: COVID negative  Radiological Study: Ordered and Reviewed

## 2024-09-24 ENCOUNTER — OFFICE VISIT (OUTPATIENT)
Dept: ORTHOPEDICS | Facility: CLINIC | Age: 74
End: 2024-09-24
Payer: MEDICARE

## 2024-09-24 ENCOUNTER — HOSPITAL ENCOUNTER (OUTPATIENT)
Dept: RADIOLOGY | Facility: HOSPITAL | Age: 74
Discharge: HOME | End: 2024-09-24
Attending: PHYSICIAN ASSISTANT
Payer: MEDICARE

## 2024-09-24 VITALS — BODY MASS INDEX: 30.45 KG/M2 | HEIGHT: 67 IN | WEIGHT: 194 LBS

## 2024-09-24 DIAGNOSIS — M19.042 PRIMARY OSTEOARTHRITIS OF LEFT HAND: ICD-10-CM

## 2024-09-24 DIAGNOSIS — R20.2 PARESTHESIAS IN LEFT HAND: ICD-10-CM

## 2024-09-24 DIAGNOSIS — R20.2 PARESTHESIAS IN LEFT HAND: Primary | ICD-10-CM

## 2024-09-24 PROCEDURE — 1160F RVW MEDS BY RX/DR IN RCRD: CPT | Mod: CPTII,S$GLB,, | Performed by: PHYSICIAN ASSISTANT

## 2024-09-24 PROCEDURE — 73130 X-RAY EXAM OF HAND: CPT | Mod: TC,PO,LT

## 2024-09-24 PROCEDURE — 99999 PR PBB SHADOW E&M-EST. PATIENT-LVL III: CPT | Mod: PBBFAC,,, | Performed by: PHYSICIAN ASSISTANT

## 2024-09-24 PROCEDURE — 1126F AMNT PAIN NOTED NONE PRSNT: CPT | Mod: CPTII,S$GLB,, | Performed by: PHYSICIAN ASSISTANT

## 2024-09-24 PROCEDURE — 3288F FALL RISK ASSESSMENT DOCD: CPT | Mod: CPTII,S$GLB,, | Performed by: PHYSICIAN ASSISTANT

## 2024-09-24 PROCEDURE — 1159F MED LIST DOCD IN RCRD: CPT | Mod: CPTII,S$GLB,, | Performed by: PHYSICIAN ASSISTANT

## 2024-09-24 PROCEDURE — 1101F PT FALLS ASSESS-DOCD LE1/YR: CPT | Mod: CPTII,S$GLB,, | Performed by: PHYSICIAN ASSISTANT

## 2024-09-24 PROCEDURE — 73130 X-RAY EXAM OF HAND: CPT | Mod: 26,LT,, | Performed by: RADIOLOGY

## 2024-09-24 PROCEDURE — 99214 OFFICE O/P EST MOD 30 MIN: CPT | Mod: S$GLB,,, | Performed by: PHYSICIAN ASSISTANT

## 2024-09-24 NOTE — PROGRESS NOTES
9/24/2024    HPI:  Tali Fitzpatrick is a 74 y.o. female, who presents to clinic today for evaluation of her left hand pain, numbness, and tingling.  States she has tingling of the fingers, especially of the tips of the fingers at all times.  Denies any nocturnal paresthesias that wake up at night.  States he does have difficulty making a tight fist of the left hand.  States she has increased pain with attempts to make a tight fist.  States he has been taking gabapentin, which has helped her symptoms.  States he is also currently on a steroid pack.  Denies any recent acute injuries.  Denies any other complaints this time.    PMHX:  Past Medical History:   Diagnosis Date    Anxiety and depression     CKD (chronic kidney disease)     DJD (degenerative joint disease)     bilateral knees   Hyalgan series completed 8/27/14    Hyperlipidemia     Hypertension     Insomnia        PSHX:  Past Surgical History:   Procedure Laterality Date    CATARACT EXTRACTION W/  INTRAOCULAR LENS IMPLANT      left    EYE SURGERY      LIPOSUCTION      RADIOFREQUENCY ABLATION Right 03/04/2022    Procedure: Radiofrequency Ablation;  Surgeon: Deon Ragland MD;  Location: [facility] OR;  Service: Orthopedics;  Laterality: Right;  KNEE-Cooled    TONSILLECTOMY      TOTAL KNEE ARTHROPLASTY         FMHX:  Family History   Problem Relation Name Age of Onset    Arthritis Mother      Coronary artery disease Mother      Coronary artery disease Father      Colon cancer Neg Hx      Breast cancer Neg Hx      Diabetes Neg Hx         SOCHX:  Social History     Tobacco Use    Smoking status: Never     Passive exposure: Never    Smokeless tobacco: Never   Substance Use Topics    Alcohol use: Yes     Alcohol/week: 0.0 standard drinks of alcohol     Comment: social       ALLERGIES:  Micardis [telmisartan]    CURRENT MEDICATIONS:  Current Outpatient Medications on File Prior to Visit   Medication Sig Dispense Refill    amLODIPine (NORVASC) 10 MG tablet Take 1  "tablet (10 mg total) by mouth once daily. 90 tablet 3    atenoloL (TENORMIN) 50 MG tablet Take 1 tablet (50 mg total) by mouth once daily. 90 tablet 3    brompheniramine-pseudoeph-DM (BROMFED DM) 2-30-10 mg/5 mL Syrp Take 10 mLs by mouth every 6 (six) hours as needed (cough). 118 mL 0    busPIRone (BUSPAR) 30 MG Tab Take 1 tablet (30 mg total) by mouth 2 (two) times daily. Morning and evening. 180 tablet 1    cyanocobalamin (VITAMIN B-12) 1000 MCG tablet Take 1,000 mcg by mouth once daily.      DULoxetine (CYMBALTA) 20 MG capsule Take 1 capsule (20 mg total) by mouth once daily. 90 capsule 1    fluticasone propionate (FLONASE) 50 mcg/actuation nasal spray 2 sprays (100 mcg total) by Each Nostril route once daily. 16 g 11    folic acid/multivit-min/lutein (CENTRUM SILVER ORAL) Take 1 tablet by mouth once daily.      gabapentin (NEURONTIN) 300 MG capsule Take 1 capsule (300 mg total) by mouth 3 (three) times daily. 90 capsule 1    hydrOXYzine pamoate (VISTARIL) 50 MG Cap TAKE 1 CAPSULE EVERY 8 (EIGHT) HOURS AS NEEDED. 120 capsule 2    methylPREDNISolone (MEDROL DOSEPACK) 4 mg tablet use as directed 21 each 0    pravastatin (PRAVACHOL) 40 MG tablet Take 1 tablet (40 mg total) by mouth once daily. 90 tablet 3    promethazine-dextromethorphan (PROMETHAZINE-DM) 6.25-15 mg/5 mL Syrp Take 10 mLs by mouth nightly as needed (cough). 118 mL 0    traZODone (DESYREL) 50 MG tablet Take 1 tablet (50 mg total) by mouth nightly. 90 tablet 1     No current facility-administered medications on file prior to visit.       REVIEW OF SYSTEMS:  Review of Systems Complete; Negative, unless noted above.    GENERAL PHYSICAL EXAM:   Ht 5' 7" (1.702 m)   Wt 88 kg (194 lb 0.1 oz)   BMI 30.39 kg/m²    GEN: well developed, well nourished, no acute distress   PULM: No wheezing, no respiratory distress   CV: RRR    ORTHO EXAM:   Examination of the left hand/wrist reveals no edema, erythema, ecchymosis, or skin breakdown.  Able to flex extend the " fingers appropriately.  Mildly reduced range of motion regards to flexion of the fingers.  Tenderness with forced flexion of the fingers to make a composite fist.  Reduced sensation of the distal aspects of the fingers.  Normal sensation in the remainder of the radial, ulnar, median nerve distributions.  Capillary refill is 2 seconds.    RADIOLOGY:   X-rays of the left hand were taken today in clinic.  X-rays reviewed by myself.  Imaging showed no acute fracture or dislocation.  No subluxation.  No radiopaque foreign body or mass noted.  No osseous destructive/erosive processes noted.  Presence of mild to moderate degenerative changes of the 1st CMC joint.  Presence of mild scattered degenerative changes throughout the remainder of the joints of the left hand/wrist.  No other significant bony abnormalities noted.    ASSESSMENT:   Left hand paresthesias/pain    PLAN:  1. I discussed with Tali Fitzpatrick that the best course of action this time is to proceed with nighttime splinting via a left carpal tunnel splint.  We also discussed would proceed with an EMG nerve conduction study to evaluate for the presence of any mononeuropathies of the left upper extremity as the cause of her symptoms.  She verbally agreed with the treatment plan.      2.  She was scheduled for an EMG nerve conduction study of the left upper extremity in clinic today     3. She was provided a removable Velcro carpal tunnel splint in clinic today.  She was instructed to wear it every night until seen again in clinic.  She verbalized understanding     4.  I would like her follow up in clinic after the EMG to discuss the results.  She was instructed to contact clinic for any problems or concerns in the interim.

## 2024-10-25 ENCOUNTER — HOSPITAL ENCOUNTER (OUTPATIENT)
Dept: RADIOLOGY | Facility: HOSPITAL | Age: 74
Discharge: HOME | End: 2024-10-25
Attending: PHYSICIAN ASSISTANT
Payer: MEDICARE

## 2024-10-25 ENCOUNTER — OFFICE VISIT (OUTPATIENT)
Dept: PHYSICAL MEDICINE AND REHAB | Facility: CLINIC | Age: 74
End: 2024-10-25
Payer: MEDICARE

## 2024-10-25 DIAGNOSIS — G56.02 LEFT CARPAL TUNNEL SYNDROME: Primary | ICD-10-CM

## 2024-10-25 DIAGNOSIS — R29.818 OTHER SYMPTOMS AND SIGNS INVOLVING THE NERVOUS SYSTEM: ICD-10-CM

## 2024-10-25 DIAGNOSIS — R20.2 PARESTHESIAS IN LEFT HAND: ICD-10-CM

## 2024-10-25 DIAGNOSIS — G45.9 TIA (TRANSIENT ISCHEMIC ATTACK): ICD-10-CM

## 2024-10-25 DIAGNOSIS — M54.12 CERVICAL RADICULAR PAIN: ICD-10-CM

## 2024-10-25 PROCEDURE — 70553 MRI BRAIN STEM W/O & W/DYE: CPT | Mod: 26,,, | Performed by: RADIOLOGY

## 2024-10-25 PROCEDURE — 25500020 PHARM REV CODE 255: Mod: PO | Performed by: PHYSICIAN ASSISTANT

## 2024-10-25 PROCEDURE — A9585 GADOBUTROL INJECTION: HCPCS | Mod: PO | Performed by: PHYSICIAN ASSISTANT

## 2024-10-25 PROCEDURE — 72141 MRI NECK SPINE W/O DYE: CPT | Mod: 26,,, | Performed by: RADIOLOGY

## 2024-10-25 PROCEDURE — 99999 PR PBB SHADOW E&M-EST. PATIENT-LVL II: CPT | Mod: PBBFAC,,, | Performed by: PHYSICAL MEDICINE & REHABILITATION

## 2024-10-25 PROCEDURE — 72141 MRI NECK SPINE W/O DYE: CPT | Mod: TC,PO

## 2024-10-25 PROCEDURE — 70553 MRI BRAIN STEM W/O & W/DYE: CPT | Mod: TC,PO

## 2024-10-25 RX ORDER — GADOBUTROL 604.72 MG/ML
8 INJECTION INTRAVENOUS
Status: COMPLETED | OUTPATIENT
Start: 2024-10-25 | End: 2024-10-25

## 2024-10-25 RX ADMIN — GADOBUTROL 8 ML: 604.72 INJECTION INTRAVENOUS at 11:10

## 2024-10-25 NOTE — PROGRESS NOTES
[facility]   [facility]  [address] [place] [address]  [phone]             Full Name: Tali Fitzpatrick Gender: Female  Patient ID: [id number] YOB: 1950      Visit Date: 10/25/2024 9:46 AM  Age: 74 Years  Examining Physician: Tala Avalos D.O.   Referring Physician: Chato Hopkins PA-C   Technologist: RUCHI Tan   Height: 5 feet 7 inch  History: Patient complaining of numbness, tingling and pain of left hand.        Sensory NCS      Nerve / Sites Rec. Site Onset Lat Peak Lat NP Amp Segments Distance Velocity     ms ms µV  cm m/s   L Median - Digit III (Antidromic)      Wrist Dig III 3.50 4.44 11.5 Wrist - Dig III 14 40      Ref.   <=3.60 >=15.0 Ref.         Sensory Nerve Conduction Study       Nerve / Sites Rec. Site Onset Lat Peak Lat NP Amp PP Amp Segments Distance Velocity Comment     ms ms µV µV  cm m/s    L Median - Dig III (Antidromic)      Wrist Index 3.75 4.64 12.1 24.1 Wrist - Index 14 37       Ref.  <=3.20 <=4.00   Ref.      L Ulnar - Dig V (Antidromic)      Wrist Dig V 2.55 3.44 8.1 17.1 Wrist - Dig V 14 55       Ref.  <=3.10 <=4.00   Ref.      L Radial - Superficial (Antidromic)      Forearm Wrist 1.77 2.55 29.2 6.0 Forearm - Wrist 10 56       Ref.  <=2.20 <=2.80 >=7.0 >=11.0 Ref.          Motor Nerve Conduction Study       Nerve / Sites Muscle Latency Ref. Amplitude Ref. Segments Dist. Lat Diff Velocity Ref. Comments     ms ms mV mV  cm ms m/s m/s    L Median - APB      Wrist APB 4.73 <=4.40 5.8 >=3.8 Wrist - APB 8          Elbow APB 9.23  5.7  Elbow - Wrist 23.5 4.50 52.2 >=53.0    L Ulnar - ADM      Wrist ADM 2.90 <=3.70 10.7 >=7.9 Wrist - ADM 8          B.Elbow ADM 6.25  10.6  B.Elbow - Wrist 20 3.35 59.6 >=52.0       A.Elbow ADM 7.98  10.5  A.Elbow - B.Elbow 10 1.73 57.8 >=43.0        EMG Summary Table     Spontaneous MUAP Recruitment   Muscle IA Fib PSW Fasc CRD Amp Dur. PPP Pattern   L. Deltoid N None None None  None N N N N   L. Biceps brachii N None None None None N N N N   L. Triceps brachii N None None None None N N N N   L. Pronator teres N None None None None N N N N   L. Abductor pollicis brevis N None None None None N N N N       Summary    The motor conduction test was performed on 2 nerve(s). The results were normal in 1 nerve(s): L Ulnar - ADM. Results outside the specified normal range were found in 1 nerve(s), as follows:  In the L Median - APB study  the take off latency result was increased for Wrist stimulation  the take off velocity result was reduced for Elbow - Wrist segment    The sensory conduction test was performed on 4 nerve(s). The results were normal in 2 nerve(s): L Ulnar - Dig V (Antidromic), L Radial - Superficial (Antidromic). Results outside the specified normal range were found in 2 nerve(s), as follows:  In the L Median - Digit III (Antidromic) study  the peak latency result was increased for Wrist stimulation  the peak amplitude result was reduced for Wrist stimulation  In the L Median - Dig III (Antidromic) study  the peak latency result was increased for Wrist stimulation    The needle EMG study was normal in all 5 tested muscles: L. Deltoid, L. Biceps brachii, L. Triceps brachii, L. Pronator teres, L. Abductor pollicis brevis.         Impression:  Abnormal study.    Moderate left carpal tunnel syndrome, without active denervation.    No electrophysiologic evidence slept cervical radiculopathy/plexopathy, left ulnar mononeuropathy, or peripheral neuropathy in the left upper extremity.    ------------------------------  Tala Avalos, DO

## 2024-10-28 ENCOUNTER — OFFICE VISIT (OUTPATIENT)
Dept: ORTHOPEDICS | Facility: CLINIC | Age: 74
End: 2024-10-28
Payer: MEDICARE

## 2024-10-28 VITALS — WEIGHT: 194 LBS | HEIGHT: 67 IN | BODY MASS INDEX: 30.45 KG/M2

## 2024-10-28 DIAGNOSIS — G56.02 CARPAL TUNNEL SYNDROME OF LEFT WRIST: Primary | ICD-10-CM

## 2024-10-28 PROCEDURE — 1101F PT FALLS ASSESS-DOCD LE1/YR: CPT | Mod: CPTII,S$GLB,, | Performed by: PHYSICIAN ASSISTANT

## 2024-10-28 PROCEDURE — 1159F MED LIST DOCD IN RCRD: CPT | Mod: CPTII,S$GLB,, | Performed by: PHYSICIAN ASSISTANT

## 2024-10-28 PROCEDURE — 99999 PR PBB SHADOW E&M-EST. PATIENT-LVL III: CPT | Mod: PBBFAC,,, | Performed by: PHYSICIAN ASSISTANT

## 2024-10-28 PROCEDURE — 1126F AMNT PAIN NOTED NONE PRSNT: CPT | Mod: CPTII,S$GLB,, | Performed by: PHYSICIAN ASSISTANT

## 2024-10-28 PROCEDURE — 1160F RVW MEDS BY RX/DR IN RCRD: CPT | Mod: CPTII,S$GLB,, | Performed by: PHYSICIAN ASSISTANT

## 2024-10-28 PROCEDURE — 99213 OFFICE O/P EST LOW 20 MIN: CPT | Mod: S$GLB,,, | Performed by: PHYSICIAN ASSISTANT

## 2024-10-28 PROCEDURE — 3288F FALL RISK ASSESSMENT DOCD: CPT | Mod: CPTII,S$GLB,, | Performed by: PHYSICIAN ASSISTANT

## 2024-10-28 RX ORDER — CHLORHEXIDINE GLUCONATE ORAL RINSE 1.2 MG/ML
15 SOLUTION DENTAL
COMMUNITY
Start: 2024-10-18 | End: 2024-11-01

## 2024-10-28 RX ORDER — CLINDAMYCIN HYDROCHLORIDE 300 MG/1
300 CAPSULE ORAL
COMMUNITY
Start: 2024-10-18 | End: 2024-10-28

## 2024-11-05 ENCOUNTER — TELEPHONE (OUTPATIENT)
Dept: PRIMARY CARE CLINIC | Facility: CLINIC | Age: 74
End: 2024-11-05
Payer: MEDICARE

## 2024-11-05 NOTE — TELEPHONE ENCOUNTER
----- Message from Gurjit Perez PA-C sent at 10/25/2024  1:38 PM CDT -----  Tali Fitzpatrick    The MRI of the C-spine shows disks problems which can cause pain and weakness in the upper extremities. Recommend to follow up with spine specialist if not better.

## 2024-11-05 NOTE — TELEPHONE ENCOUNTER
Pt reviewed results via MRI called pt to follow up regarding pain no answer lvm for pt to return call

## 2024-11-15 ENCOUNTER — OFFICE VISIT (OUTPATIENT)
Dept: PRIMARY CARE CLINIC | Facility: CLINIC | Age: 74
End: 2024-11-15
Payer: MEDICARE

## 2024-11-15 VITALS
BODY MASS INDEX: 29.49 KG/M2 | OXYGEN SATURATION: 97 % | HEART RATE: 108 BPM | DIASTOLIC BLOOD PRESSURE: 60 MMHG | WEIGHT: 188.25 LBS | SYSTOLIC BLOOD PRESSURE: 130 MMHG | RESPIRATION RATE: 18 BRPM

## 2024-11-15 DIAGNOSIS — Z23 NEED FOR VACCINATION: ICD-10-CM

## 2024-11-15 DIAGNOSIS — R73.09 ELEVATED GLUCOSE: ICD-10-CM

## 2024-11-15 DIAGNOSIS — Z00.00 WELLNESS EXAMINATION: ICD-10-CM

## 2024-11-15 DIAGNOSIS — N18.32 STAGE 3B CHRONIC KIDNEY DISEASE: Primary | ICD-10-CM

## 2024-11-15 LAB
ALBUMIN SERPL BCP-MCNC: 3.8 G/DL (ref 3.5–5.2)
ALP SERPL-CCNC: 70 U/L (ref 40–150)
ALT SERPL W/O P-5'-P-CCNC: 12 U/L (ref 10–44)
ANION GAP SERPL CALC-SCNC: 8 MMOL/L (ref 8–16)
AST SERPL-CCNC: 14 U/L (ref 10–40)
BILIRUB SERPL-MCNC: 0.3 MG/DL (ref 0.1–1)
BUN SERPL-MCNC: 16 MG/DL (ref 8–23)
CALCIUM SERPL-MCNC: 9.3 MG/DL (ref 8.7–10.5)
CHLORIDE SERPL-SCNC: 107 MMOL/L (ref 95–110)
CO2 SERPL-SCNC: 23 MMOL/L (ref 23–29)
CREAT SERPL-MCNC: 1.7 MG/DL (ref 0.5–1.4)
EST. GFR  (NO RACE VARIABLE): 30.9 ML/MIN/1.73 M^2
ESTIMATED AVG GLUCOSE: 114 MG/DL (ref 68–131)
GLUCOSE SERPL-MCNC: 120 MG/DL (ref 70–110)
HBA1C MFR BLD: 5.6 % (ref 4–5.6)
POTASSIUM SERPL-SCNC: 3.6 MMOL/L (ref 3.5–5.1)
PROT SERPL-MCNC: 7.5 G/DL (ref 6–8.4)
SODIUM SERPL-SCNC: 138 MMOL/L (ref 136–145)

## 2024-11-15 PROCEDURE — 83036 HEMOGLOBIN GLYCOSYLATED A1C: CPT | Performed by: PHYSICIAN ASSISTANT

## 2024-11-15 PROCEDURE — 80053 COMPREHEN METABOLIC PANEL: CPT | Performed by: PHYSICIAN ASSISTANT

## 2024-11-15 NOTE — PROGRESS NOTES
Subjective     Patient ID: Tali Fitzpatrick is a 74 y.o. female.    Chief Complaint: Follow-up    Patient is a 73 yo female here for a check up. She was recently seen by orthropedics for left CTS. She has been using a wrist splint and is doing well.     Patient Active Problem List:     Bilateral primary osteoarthritis of knee     Bilateral knee pain     Stage 3b chronic kidney disease     Hypertension     Anxiety     Dyslipidemia     Vitamin D deficiency     Cognitive complaints with normal exam     Word finding difficulty     Psychosocial stressors     Other amnesia     Depression     History of TIA (transient ischemic attack) and stroke     Coronary artery disease    Conditions are currently stable    Past Medical History:  No date: Anxiety and depression  No date: CKD (chronic kidney disease)  No date: DJD (degenerative joint disease)      Comment:  bilateral knees   Hyalgan series completed 8/27/14  No date: Hyperlipidemia  No date: Hypertension  No date: Insomnia        Review of Systems   Constitutional:  Negative for chills, fatigue and fever.   Respiratory:  Negative for chest tightness and shortness of breath.    Gastrointestinal:  Negative for abdominal pain.   Genitourinary:  Negative for dysuria and hematuria.          Objective     Physical Exam  Vitals reviewed.   Constitutional:       General: She is not in acute distress.     Appearance: Normal appearance. She is not ill-appearing, toxic-appearing or diaphoretic.   HENT:      Head: Normocephalic and atraumatic.   Cardiovascular:      Rate and Rhythm: Normal rate and regular rhythm.      Pulses: Normal pulses.      Heart sounds: Normal heart sounds.   Pulmonary:      Effort: Pulmonary effort is normal.      Breath sounds: Normal breath sounds.   Abdominal:      General: Abdomen is flat.      Palpations: Abdomen is soft.   Neurological:      Mental Status: She is alert.            Assessment and Plan     1. Stage 3b chronic kidney disease  -      Comprehensive Metabolic Panel; Future; Expected date: 11/15/2024    2. Wellness examination  -     Hemoglobin A1C; Future; Expected date: 11/15/2024    3. Elevated glucose  -     Hemoglobin A1C; Future; Expected date: 11/15/2024    4. Need for vaccination  -     Influenza - Trivalent (Adjuvanted)        I spent 30 minutes on this encounter, time includes face-to-face, chart review, documentation, test review and orders.

## 2024-11-18 DIAGNOSIS — N18.31 STAGE 3A CHRONIC KIDNEY DISEASE: Primary | ICD-10-CM

## 2024-11-21 ENCOUNTER — TELEPHONE (OUTPATIENT)
Dept: NEPHROLOGY | Facility: CLINIC | Age: 74
End: 2024-11-21
Payer: MEDICARE

## 2024-11-21 NOTE — TELEPHONE ENCOUNTER
Attempted to contact patient. Unable to reach them at this time. Patient added to appointment wait list.

## 2024-11-21 NOTE — TELEPHONE ENCOUNTER
Received call from patient. Patient requested an appointment with a nephrologist. Patient added to wait list to be scheduled when a new patient appointment slot becomes available.

## 2024-11-21 NOTE — TELEPHONE ENCOUNTER
----- Message from Pauly sent at 11/21/2024  2:33 PM CST -----  Contact: self  Type:  Sooner Appointment Request    Caller is requesting a sooner appointment.  Caller declined first available appointment listed below.  Caller will not accept being placed on the waitlist and is requesting a message be sent to doctor.    Name of Caller:  the patient  When is the first available appointment?  N/a  Symptoms:  Stage 3a chronic kidney disease  Would the patient rather a call back or a response via [facility]? call  Best Call Back Number:  [phone]  Additional Information:  referral on file and pt ready to schedule

## 2024-11-21 NOTE — TELEPHONE ENCOUNTER
----- Message from Jasmine sent at 11/21/2024  4:37 PM CST -----  Regarding: call back  Contact: patient  Type: Needs Medical Advice  Who Called:  patient  Symptoms (please be specific):    How long has patient had these symptoms:   Pharmacy name and phone #:    Best Call Back Number: [phone]    Additional Information: alina khan pt is returning ur call are u available to assist MRN: [id number] LESIA BURNS

## 2024-12-30 ENCOUNTER — OFFICE VISIT (OUTPATIENT)
Dept: PSYCHIATRY | Facility: CLINIC | Age: 74
End: 2024-12-30
Payer: MEDICARE

## 2024-12-30 VITALS — DIASTOLIC BLOOD PRESSURE: 72 MMHG | HEART RATE: 69 BPM | SYSTOLIC BLOOD PRESSURE: 118 MMHG

## 2024-12-30 DIAGNOSIS — F41.1 GAD (GENERALIZED ANXIETY DISORDER): Primary | ICD-10-CM

## 2024-12-30 DIAGNOSIS — G47.00 INSOMNIA, UNSPECIFIED TYPE: ICD-10-CM

## 2024-12-30 DIAGNOSIS — F33.9 RECURRENT MAJOR DEPRESSIVE DISORDER, REMISSION STATUS UNSPECIFIED: ICD-10-CM

## 2024-12-30 PROCEDURE — G2211 COMPLEX E/M VISIT ADD ON: HCPCS | Mod: S$GLB,,, | Performed by: PSYCHIATRY & NEUROLOGY

## 2024-12-30 PROCEDURE — 1159F MED LIST DOCD IN RCRD: CPT | Mod: CPTII,S$GLB,, | Performed by: PSYCHIATRY & NEUROLOGY

## 2024-12-30 PROCEDURE — 99999 PR PBB SHADOW E&M-EST. PATIENT-LVL I: CPT | Mod: PBBFAC,,, | Performed by: PSYCHIATRY & NEUROLOGY

## 2024-12-30 PROCEDURE — 1160F RVW MEDS BY RX/DR IN RCRD: CPT | Mod: CPTII,S$GLB,, | Performed by: PSYCHIATRY & NEUROLOGY

## 2024-12-30 PROCEDURE — 99214 OFFICE O/P EST MOD 30 MIN: CPT | Mod: S$GLB,,, | Performed by: PSYCHIATRY & NEUROLOGY

## 2024-12-30 PROCEDURE — 3078F DIAST BP <80 MM HG: CPT | Mod: CPTII,S$GLB,, | Performed by: PSYCHIATRY & NEUROLOGY

## 2024-12-30 PROCEDURE — 3074F SYST BP LT 130 MM HG: CPT | Mod: CPTII,S$GLB,, | Performed by: PSYCHIATRY & NEUROLOGY

## 2024-12-30 RX ORDER — DULOXETIN HYDROCHLORIDE 20 MG/1
20 CAPSULE, DELAYED RELEASE ORAL DAILY
Qty: 90 CAPSULE | Refills: 3 | Status: SHIPPED | OUTPATIENT
Start: 2024-12-30

## 2024-12-30 RX ORDER — BUSPIRONE HYDROCHLORIDE 30 MG/1
30 TABLET ORAL 2 TIMES DAILY
Qty: 180 TABLET | Refills: 3 | Status: SHIPPED | OUTPATIENT
Start: 2024-12-30

## 2024-12-30 RX ORDER — TRAZODONE HYDROCHLORIDE 50 MG/1
50 TABLET ORAL NIGHTLY
Qty: 90 TABLET | Refills: 3 | Status: SHIPPED | OUTPATIENT
Start: 2024-12-30
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
## 2025-01-23 NOTE — PROGRESS NOTES
"Subjective:      Patient ID: Tali Fitzpatrick is a 74 y.o. female.    Chief Complaint:  Memory loss.     HPI 74 Years old AA Female with PMHx of TIA's / CAD / HTN / HLD and others Medical issues   came with Daughter for the evaluation and recommendation of " Memory difficulties ".      Started: 2 years.   Describes: words findings difficulties.   Timing: constant.   Frequency: daily.   Pain:  0 to 4/ 10.   Location: CNS.   Family: No dementia.   Medications: Trazodone / Pravachol / Norvasc / Cymbalta / Buspar.   Worsen: stress.   Alleviated: none.   Associated symptoms: short term memory loss / TIA's /   Triggers: none.   Prodrome symptoms: none.   Auras: none.            Referral by Daughter.          die few months ago /         Denied visual hallucinations / no behavioral issues / denied getting lost driving / denied asking same questions.     The patient is here for memory loss.     The patient is presenting with a-year history of memory loss. The patient is accompanied by Daughter. The main problems the patient has are related to progressive short term memory loss. For example, the patient would repeat the same question repeatedly. The patient excessively forgets where placed certain things. The patient also started forgetting names. The patient is driving and denies getting lost. The patient is not losing personal items and does not put them in odd places. No confusion around and inside the house. No trouble remembering the date and time, keeping up with medications and appointments and keeping up with major holidays and political changes. The patient is independent in handling finances. The patient is still independent with ADLs. No agitation or aggression. No hallucinations or delusions. No seizures. No language problems. No problems handling tools. No history of strokes. No history of headaches. No history of hypothyroidism. No history of alcoholism (young onset or old onset). No history of B12 " deficiency. No history of depression. No history of bipolar disorder. No history of Untreated Syphilis.  No history of HIV infection. No toxic exposures.  No history of traumatic brain injury. No tremors or abnormal movements. No falls or instability. No urinary incontinence. No change in sleep and appetite. No family history of dementia.     Review of Systems   Neurological:         Memory difficulties.    All other systems reviewed and are negative.    Objective:     Neurological Exam  Mental Status  Alert. Oriented to person, place, time and situation. Recalls 3 of 3 objects immediately. At 5 minutes recalls 1 of 3 objects. Recalls 2 of 3 objects with prompting. Able to copy figure. Clock drawing is normal. Speech is normal. Language is fluent with no aphasia. Attention and concentration are normal. Fund of knowledge is appropriate for level of education. Apraxia absent.    Cranial Nerves  CN I: Sense of smell is normal.  CN II: Visual acuity is normal. Visual fields full to confrontation.  CN III, IV, VI: Extraocular movements intact bilaterally. Normal lids and orbits bilaterally. Pupils equal round and reactive to light bilaterally.  CN V: Facial sensation is normal.  CN VII: Full and symmetric facial movement.  CN VIII: Hearing is normal.  CN IX, X: Palate elevates symmetrically. Normal gag reflex.  CN XI: Shoulder shrug strength is normal.  CN XII: Tongue midline without atrophy or fasciculations.    Motor  Normal muscle bulk throughout. No fasciculations present. Normal muscle tone. No abnormal involuntary movements. Strength is 5/5 throughout all four extremities.    Sensory  Sensation is intact to light touch, pinprick, vibration and proprioception in all four extremities.    Reflexes                                            Right                      Left  Brachioradialis                    2+                         2+  Biceps                                 2+                         2+  Triceps                                 2+                         2+  Finger flex                           2+                         2+  Hamstring                            2+                         2+  Patellar                                2+                         2+  Achilles                                2+                         2+  Right Plantar: downgoing  Left Plantar: downgoing  Jaw jerk absent.  Right pathological reflexes: Eugenio's absent. Ankle clonus absent.  Left pathological reflexes: Eugenio's absent. Ankle clonus absent.  Glabellar tap absent. Snout absent. Right palmomental absent. Left palmomental absent. Right palmar grasp absent. Left palmar grasp absent.    Coordination    Finger-to-nose, rapid alternating movements and heel-to-shin normal bilaterally without dysmetria.    Gait  Normal casual, toe, heel and tandem gait.      Physical Exam  Vitals and nursing note reviewed.   Constitutional:       Appearance: Normal appearance. She is normal weight.   HENT:      Head: Normocephalic and atraumatic.      Right Ear: Tympanic membrane normal.      Left Ear: Tympanic membrane normal.      Nose: Nose normal.      Mouth/Throat:      Mouth: Mucous membranes are moist.      Pharynx: Oropharynx is clear.   Eyes:      General: Lids are normal.      Extraocular Movements: Extraocular movements intact.      Conjunctiva/sclera: Conjunctivae normal.      Pupils: Pupils are equal, round, and reactive to light.   Cardiovascular:      Rate and Rhythm: Normal rate and regular rhythm.      Pulses: Normal pulses.      Heart sounds: Normal heart sounds.   Pulmonary:      Effort: Pulmonary effort is normal.      Breath sounds: Normal breath sounds.   Abdominal:      General: Abdomen is flat. Bowel sounds are normal.      Palpations: Abdomen is soft.   Genitourinary:     Comments: Deferred.   Musculoskeletal:         General: Normal range of motion.      Cervical back: Normal range of motion and neck supple.   Skin:      General: Skin is warm and dry.      Capillary Refill: Capillary refill takes less than 2 seconds.   Neurological:      Mental Status: She is alert. Mental status is at baseline.      Motor: Motor strength is normal.     Coordination: Coordination is intact.      Deep Tendon Reflexes:      Reflex Scores:       Tricep reflexes are 2+ on the right side and 2+ on the left side.       Bicep reflexes are 2+ on the right side and 2+ on the left side.       Brachioradialis reflexes are 2+ on the right side and 2+ on the left side.       Patellar reflexes are 2+ on the right side and 2+ on the left side.       Achilles reflexes are 2+ on the right side and 2+ on the left side.  Psychiatric:         Mood and Affect: Mood normal.         Speech: Speech normal.         Behavior: Behavior normal.         Thought Content: Thought content normal.         Judgment: Judgment normal.        ALETHA COGNITIVE ASSESSMENT (MOCA) TOTAL SCORE: 22 / 30 - missed 1 words out of 5 in 5 minutes.      NORMAL-MILD NCD 26-30  MILD DEMENTIA 20-25  MODERATE DEMENTIA 10-19  SEVERE DEMENTIA <10.    DATE  01/ 27/ 2025.            TOTAL SCORE 22           VISUOSPATIAL EXECUTIVE (5) 4           NAMING (3) 3           ATTENTION (6) 3           LANGUAGE (3) 3           ABSTRACTION(2) 2           RECALL (5) 1           ORIENTATION (6) 6              Assessment:   74 Years old AA Female with PMHX as above came of the evaluation of Memory difficulties.   - MCI with Memory difficulties.   - MAJOR NEUROCOGNITIVE DISORDER  Plan:   Patient Neurological Assessment is remarkable for short term memory difficulties.     EVALUATION     DriveAble to assess the cognitive aspects of driving.     Blood markers for AD (amyloid levels and tau levels like [id number] and [id number]) will likely to be useful.  (FDA approved Lumipulse G ?-amyloid Ratio). Functional imaging and CSF analyses have proven expensive, invasive and inconvenient.    CUS.   Neuropsychological assessment.      Labs: CBC / CMP / HIV / RPR / TSH / [facility] / Hgb A 1 C / Lipids panel - 2024 - non significant abnormalities.     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     Personally reviewed MRI Cervical spine - 2024 - no acute changes / DGD's changes.     DISEASE-MODIFYING AGENTS   Explained to the patient and family that medications are intended to slow down the progression (in the early stages of the disease) and not stop it or reverse the disease. The disease is relentless, progressive and so far, cannot be controlled.   Progression includes Cognitive decline, Behavioral disturbances, and Motor decline as well.     I counseled the patient and family that the rate of progression is extremely variable,   and the average (10 years) is an inaccurate measure.     CLASSES:    NMDA RECEPTOR ANTAGONISTS  Will start memantine/Namenda and titrate slowly to 10 mg BID. The side effects include dizziness,   seizures and nightmares and discussed with patient and family. (Other formulations include Namenda XR)    CHOLINESTERASE INHIBITORS (CEI)    Once stable on Namenda will start donepezil/Aricept and titrate slowly to 10 mg QHS. The side effects include dizziness, diarrhea and nightmares and discussed with patient and family.  It can rarely cause bradycardia, syncope, and prolonged QT.     If GI symptoms become an issue will try rivastigmine/Exelon patches. Will obtain EKG before and after Aricept to verify effects on QT interval!  (Other formulations Adlarity TTS 5/10 mg weekly). Patches are convenient, bypass the GI system but have unintended consequences of being taken off prematurely or being duplicated accidentally.     ANTI-AMYLOID MONOCLONAL ANTIBODIES (SPECIFICALLY FOR ALZHEIMER-TYPE DEMENTIA)      Aducanumab (Aduhelm), Lecanemab (Leqembi) and Donanemab.     The approval process has been sub optimal with many unknowns and problems.  The benefits are questionable, the  risks are sizeable and the burdern is very high.    The actual cognitive benefits remain unsubstantiated and the risks (including brains welling, bleeding and even death) may outweigh the questionable benefits. There are also concerns regarding worsening brain atrophy that need further investigation.     The medications are currently restricted to mild cases of Alzheimer's type dementia only.   Patients with increased risk of bleeding are excluded.    These medications are not for moderate and severe cases.   These medications are not for other types of dementia.   There are no long-term safety data.   The actual cognitive benefits remain unsubstantiated and the risks (including brains welling, bleeding and even death) may outweigh the questionable benefits. There are also concerns regarding worsening brain atrophy that need further investigation.     Even if a physician decides to use such medications there are numerous steps that need to be taken that including special diagnostic methods which include spinal tap and functional imaging, frequent and extensive monitoring. The method of administration requires infusion setup. The cost and insurance coverage are still unclear.     Based on all the above, our clinic will only consider such options after high quality studies and post marketing (real life) experience evidence demonstrate significant clinical efficacy and safety that outweigh the risks. Once the evidence is well-established will make sure our institution is properly setup for administering the medication(s) and monitoring for serious side effects.  The cost and insurance coverage will need to be addressed as well.     MISCELLANEOUS     [facility] study regarding Sildenafil (Viagra) impact on dementia showed possible benefit. Will wait for high quality prospective double-blinded placebo-controlled trials to make any proper recommendations.     Recommended against the use of OTC non-FDA evaluated  supplements and claims.     SYMPTOMATIC MANAGEMENT-BEHAVIORAL SYMPTOMS AND NON-COGNITIVE SYMPTOMS   OPTIONS INCLUDE :     ANTIDEPRESSANTS CLASS MEDICATIONS    Trazodone 50 mg QHS to help with insomnia. Instructed the family to watch for excessive sedation or paradoxical agitation.   Other options includes Mirtazapine (Remeron).  Sertraline (Zoloft) titration to 100 mg in the morning can mitigate anxiety symptoms.   Bupropion (Wellbutrin) small dose titration  mg in the morning helps with attention and concentration in addition to depression and cravings.    ANTIEPILEPTIC CLASS MEDICATIONS     Lamotrigine (Lamictal) LTG 25 mg BID to help for agitation and mood stabilization and antiepileptic effect. Other options include: Valproic Acid (Depakote) VPA.    ANTIPSYCHOTIC AND NEUROLEPTIC CLASS MEDICATIONS     Risperdal 1 mg one hour before sunset to assist with sundowning , psychotic symptoms and behavioral disturbances. Other options include: Aripiprazole (Abilify),  Quetiapine (Seroquel) and  Brexipiprazole (Rixulti).    CANNABINOIDS CLASS MEDICATIONS     Dronabinol (Marinol) (Synthetic Delta-9 THC) titration to 2.5 mg to 5 mg to 10 mg one hour before sunset can help with agitation, anorexia and nausea/vomiting.    HOME CARE AND IN FACILITY CARE     Falling Down Precautions. Gait is affected by the disease as well.   Avoid driving and access to firearms   Incremental 24/Care   Help with finances and decision making.  Join support group.  Proofing the house and use labeling.  Avoid antihistamines and anticholinergics.    Avoid changing routine.    Use written reminders.    Avoid multitasking.    Healthy diet, exercise (physical and cognitive).    Good sleep hygiene.    HERE ARE 10 WAYS TO REDUCE RISK OF DEMENTIA AND SLOW DOWN THE PROGRESSION OF DEMENTIA    1.Be physically active.  2. Avoid smoking and alcohol consumption.  3. Track your numbers. Keep your blood pressure, cholesterol, blood sugar and weight within  recommended ranges.  4. Stay socially connected.  5. Make healthy food choices. Eat a well-balance and healthy diet that rich in cereals, fish, legumes, and vegetables.  6. Reduce stress.   7. Challenge your brain by trying something new, playing games, or learning a new language.  8. Take care of your hearing. Avoid being continuously exposed to loud sounds and wear a hearing aid if hearing does become a problem.  9. Lower risk of falls. Consider installing handrails on all stairs and grab bars in bathrooms.  10. Reduce your exposure to air pollution, such as exposure to exhaust in heavy traffic.     CAREGIVERS COUNSELING   Recommend reading the following books:     The 36-Hour Day and there are many online and printed resources to help caregivers as well.     Alzheimer's Through the Stages.     Dementia with G.R.A.C.E.    PREVENTION OF DELIRIUM     1. Good hydration and avoid electrolyte imbalance  2. Recognize and treat infections immediately especially UTI.  3. Bladder emptying and prevent constipation.   4. Provide stimulating activities and familiar objects  5. Use eyeglasses and hearing aids if needed.   6. Use simple and regular communication about people, current place, and time  7. Mobility and range-of-motion exercises  8. Reduce noise, lighting and avoid sleep interruptions  9. Non-narcotic pain management.  10.Nondrug treatment for sleep problems or anxiety  11. Avoid antihistamines.  12. Avoid narcotics.  13. Avoid benzodiazepines.    HELPFUL STRATEGIES FOR THE FAMILY AND CAREGIVERS     BEHAVIORAL MANAGEMENT STRATEGIES     Remember that you cannot reason with acute psychosis or confusion. Unless there is an immediate safety issue, there is no need to challenge or correct the person.  For example, if a pt is hallucinating, do not argue with the person that what they are seeing is not real. If they are expressing paranoia, empathize gently with their fear, and attempt to redirect them to a different  "activity.   If the person is particularly stuck on a topic or activity, as long as the activity is safe and is not worsening their agitation, you don't need to intervene.     Communicate calmly, simply, and concisely    Reassure the person that they are safe and you are here to help  Try not to express irritation or anger  Speak quietly and calmly, do not shout or threaten the person. Avoid provocation.   Establish verbal contact and provide orientation and reassurance.  Attempt to identify the patient's wants and feelings, listen to what they are saying, and reflect those wants and feelings back to the patient.  Dont use sarcasm as a weapon    Set clear limits on behavior in a calm voice ("You cannot hit the nurse.")  Offer choices and optimism ("Which toothpaste would you like to use today?")    Redirect the person to an alternative behavior ("Can you come help me with this?)    Avoid saying things like, "We already went over this!" "You can't keep doing this." "I already explained this to you"  Instead, simply nod calmly and acknowledge what the person is saying ("Yes, that makes sense."), and then request their help or company in a different behavior.   Having a mental list of activities the patient enjoys can be helpful with redirection. For example, do they enjoy going for walks? Eating a piece of candy?   If redirection becomes challenging, you can place objects that your family member enjoys strategically in the home in order to use for distraction. This is particularly useful if there are items that they often talk about or frequently ask you questions about; or if they are visually striking. Once you focus the person on this object, talk about it briefly until they are calm and then attempt to redirect to a new behavior.   Sometimes activities which are repetitive can be calming, particularly if there is a comforting sensory element. For example, pt may enjoy folding soft towels or laundry.    Limit " "overstimulation    Decrease distractions by turning off the TV, computer, any fluorescent lights that hum, etc.  Ask any casual visitors to leave--the fewer people the better  If the person is very agitated, avoid touching them, and respect their personal space  Sit down and ask the person to sit down also     PREVENTATIVE STRATEGIES     Try to help the patient develop a routine and stick to it  Address all immediate safety issues  Does the person still have access to the car and keys? Take the keys away, or disconnect the car battery.  Are they wandering at night? Install locks on the outside doors. A keypad lock works well. Alarms on bedroom doors can also be helpful.   Are they turning on the stove and risking a fire? If you cannot limit their access to the kitchen, you may need to unplug dangerous devices any time you are not in the room.   If the person is exhibiting poor judgment throughout the day, they likely need someone supervising them at all times.   Do they still have access to significant financial assets? Limit their access to accounts, and consult with a  if necessary.   Identify situations that seem to trigger agitation or a problematic behavior, and modify the person's environment to minimize or eliminate that trigger.   For example, is their behavior worse if they don't get a good night's sleep? Or if they don't eat or drink enough? If so, prioritize those activities and build behavior plans to support them.  Do they get upset about not being allowed to answer the phone when it rings? Put all of the phones in the house on "silent."   Do they become paranoid that certain family members are trying to take advantage of them? Limit contact with the targeted family member as much as possible, and re-introduce them slowly after some time has passed.   Encourage independence in daily living whenever safely possible  Encourage collaborative decision-making regarding his care as well as daily " activities  Encourage regular physical exercise  Address issues that may be impacting sleep   Ex: Urology consult for frequent nighttime urination, address chronic pain that may be interfering with sleep, moving to a different room if his roommate snores loudly, make sure the room is a comfortable temperature and he has adequate pillows and blankets  Ensure he gets out into the sunlight at least once per day to encourage regular circadian cycle  No caffeine, sugar, or large meals within two hours of bedtime   Make sure room at night is dark and quiet and minimize nighttime interruptions from staff unless medically necessary   Try to help pt go to sleep and wake up at the same time every day  Monitor closely for worsening psychiatric symptoms (insomnia, social withdrawal, deterioration of personal hygiene, hostility, confusing or nonsensical speech, paranoia, hallucinations) and intervene promptly. Assess for possible antecedents, modify environment appropriately.    SLEEP HYGIENE     Poor sleep has a negative effect on cognition. Several strategies have been shown to improve sleep:     Caffeine intake in the afternoon and evening, as well as stuffing oneself at supper, can decrease the quality of restful sleep throughout the night.   Bedtime and wake-up times should be consistent every night and morning so the body becomes used to a single routine, even on the weekends.  Engage in daily physical activity, but not 2-3 hours before bedtime.   No technology use (television, computer, iPad) 1-2 hours before bed.   Have a wind down routine (e.g., soft lights in the house, bath before bed, reduced fluid intake, songs, reading, less noise) to promote sleep readiness.   Visit the www.sleepfoundation.org for more strategies.      COGNITIVE HYGIENE     Engage in regular exercise, which increases alertness and arousal and can improve attention and focus.  Consider lower impact exercises, such as yoga or light walking.  Get a  good nights sleep, as this can enhance alertness and cognition.  Eat healthy foods and balanced meals. It is notable that research indicates certain nutrients may aid in brain function, such as B vitamins (especially B6, B12, and folic acid), antioxidants (such as vitamins C and E, and beta carotene), and Omega-3 fatty acids. Talk with your physician or nutritionist about whats right for you.   Keep your brain active. Find activities to stay mentally active, such as reading, games (cards, checkers), puzzles (crosswords, Sudoku, jig saw), crafts (models, woodworking), gardening, or participating in activities in the community.  Stay socially engaged. Continue staying active with your family and friends.    FUTURE PLANNING     The patient and caregivers should consider formal arrangements to allow a designated person to make medical and financial decisions for the pt, should he/she become unable to do so.  Options to consider include designating a healthcare proxy, medical and/or financial power of , and completing advanced directives for healthcare decisions and estate planning (e.g., finalizing a will).  If cost is prohibitive, [facility] [employer] (https://[employer]/) provides free  for individuals with low income.     ADDITIONAL RESOURCES     Alzheimer's Services of the [facility] (www.http://alzbr.org) have good local caregiver and patient resources.   Consider resources for support through the Governors Office of Elderly Affairs (http://goea.[place].[facility]/), [place] Chapter of the Alzheimers Association (www.alz.org[facility]/), the Family Caregiver [place] (www.caregiver.org), and the American Psychological Association (http://www.apa.org/pi/about/publications/caregivers/consumers/index.aspxconsumers/index.aspx).  For More Information About Hallucinations, Delusions, and Paranoia in Alzheimer's [facility] Alzheimer's and related Dementias Education and Referral ([facility]) Center  5-156-407-7789 (toll-free) raheel@maddie.nih.gov www.[place]addie.nih.gov/alzheimers The National [employer] on A[facility]'s [facility] offers information and free print publications about Alzheimer's disease and related dementias for families, caregivers, and health professionals. [facility] Center staff answer telephone, email, and written requests and make referrals to local and national resources    Please do not hesitate to contact me with any updates, questions or concerns.    RTC: 3 months.     I spent a total of 60 minutes on the day of the visit.This includes face to face time and non-face to face time preparing to see the patient (eg, review of tests), obtaining and/or reviewing separately obtained history, documenting clinical information in the electronic or other health record, independently interpreting results and communicating results to the patient/family/caregiver, or care coordinator.    James Peacock MD.  General Neurologist.

## 2025-01-27 ENCOUNTER — LAB VISIT (OUTPATIENT)
Dept: LAB | Facility: HOSPITAL | Age: 75
End: 2025-01-27
Attending: PSYCHIATRY & NEUROLOGY
Payer: MEDICARE

## 2025-01-27 ENCOUNTER — OFFICE VISIT (OUTPATIENT)
Dept: NEUROLOGY | Facility: CLINIC | Age: 75
End: 2025-01-27
Payer: MEDICARE

## 2025-01-27 VITALS
HEIGHT: 67 IN | DIASTOLIC BLOOD PRESSURE: 77 MMHG | WEIGHT: 188.25 LBS | BODY MASS INDEX: 29.55 KG/M2 | HEART RATE: 71 BPM | SYSTOLIC BLOOD PRESSURE: 139 MMHG

## 2025-01-27 DIAGNOSIS — G31.84 MCI (MILD COGNITIVE IMPAIRMENT) WITH MEMORY LOSS: Primary | ICD-10-CM

## 2025-01-27 DIAGNOSIS — I65.23 BILATERAL CAROTID ARTERY STENOSIS: ICD-10-CM

## 2025-01-27 DIAGNOSIS — Z86.73 HX OF ISCHEMIC VERTEBROBASILAR ARTERY BRAINSTEM STROKE: ICD-10-CM

## 2025-01-27 DIAGNOSIS — G31.84 MCI (MILD COGNITIVE IMPAIRMENT) WITH MEMORY LOSS: ICD-10-CM

## 2025-01-27 PROCEDURE — 1100F PTFALLS ASSESS-DOCD GE2>/YR: CPT | Mod: CPTII,S$GLB,, | Performed by: PSYCHIATRY & NEUROLOGY

## 2025-01-27 PROCEDURE — 1126F AMNT PAIN NOTED NONE PRSNT: CPT | Mod: CPTII,S$GLB,, | Performed by: PSYCHIATRY & NEUROLOGY

## 2025-01-27 PROCEDURE — 1160F RVW MEDS BY RX/DR IN RCRD: CPT | Mod: CPTII,S$GLB,, | Performed by: PSYCHIATRY & NEUROLOGY

## 2025-01-27 PROCEDURE — 83520 IMMUNOASSAY QUANT NOS NONAB: CPT | Performed by: PSYCHIATRY & NEUROLOGY

## 2025-01-27 PROCEDURE — 3078F DIAST BP <80 MM HG: CPT | Mod: CPTII,S$GLB,, | Performed by: PSYCHIATRY & NEUROLOGY

## 2025-01-27 PROCEDURE — 99215 OFFICE O/P EST HI 40 MIN: CPT | Mod: S$GLB,,, | Performed by: PSYCHIATRY & NEUROLOGY

## 2025-01-27 PROCEDURE — 82234 BETA-AMYLOID 1-42 (ABETA 42): CPT | Performed by: PSYCHIATRY & NEUROLOGY

## 2025-01-27 PROCEDURE — 0361U NEURFLMNT LT CHN DIG IA QUAN: CPT | Performed by: PSYCHIATRY & NEUROLOGY

## 2025-01-27 PROCEDURE — 3288F FALL RISK ASSESSMENT DOCD: CPT | Mod: CPTII,S$GLB,, | Performed by: PSYCHIATRY & NEUROLOGY

## 2025-01-27 PROCEDURE — 3075F SYST BP GE 130 - 139MM HG: CPT | Mod: CPTII,S$GLB,, | Performed by: PSYCHIATRY & NEUROLOGY

## 2025-01-27 PROCEDURE — 83519 RIA NONANTIBODY: CPT | Performed by: PSYCHIATRY & NEUROLOGY

## 2025-01-27 PROCEDURE — 99999 PR PBB SHADOW E&M-EST. PATIENT-LVL IV: CPT | Mod: PBBFAC,,, | Performed by: PSYCHIATRY & NEUROLOGY

## 2025-01-27 PROCEDURE — 1159F MED LIST DOCD IN RCRD: CPT | Mod: CPTII,S$GLB,, | Performed by: PSYCHIATRY & NEUROLOGY

## 2025-01-27 RX ORDER — AZELASTINE 1 MG/ML
SPRAY, METERED NASAL
COMMUNITY
Start: 2024-11-26

## 2025-01-28 DIAGNOSIS — I10 HYPERTENSION, UNSPECIFIED TYPE: ICD-10-CM

## 2025-01-28 RX ORDER — ATENOLOL 50 MG/1
50 TABLET ORAL DAILY
Qty: 90 TABLET | Refills: 3 | Status: SHIPPED | OUTPATIENT
Start: 2025-01-28

## 2025-01-30 LAB — NEUROFILAMENT LIGHT CHAIN, PLASMA: 16.9 PG/ML

## 2025-01-31 ENCOUNTER — HOSPITAL ENCOUNTER (OUTPATIENT)
Dept: CARDIOLOGY | Facility: HOSPITAL | Age: 75
Discharge: HOME | End: 2025-01-31
Attending: PSYCHIATRY & NEUROLOGY
Payer: MEDICARE

## 2025-01-31 VITALS
SYSTOLIC BLOOD PRESSURE: 116 MMHG | DIASTOLIC BLOOD PRESSURE: 75 MMHG | WEIGHT: 188 LBS | BODY MASS INDEX: 29.51 KG/M2 | HEIGHT: 67 IN

## 2025-01-31 DIAGNOSIS — I65.23 BILATERAL CAROTID ARTERY STENOSIS: ICD-10-CM

## 2025-01-31 LAB
LEFT ARM DIASTOLIC BLOOD PRESSURE: 75 MMHG
LEFT ARM SYSTOLIC BLOOD PRESSURE: 116 MMHG
LEFT CBA DIAS: 11 CM/S
LEFT CBA SYS: 50 CM/S
LEFT CCA DIST DIAS: 9 CM/S
LEFT CCA DIST SYS: 52 CM/S
LEFT CCA MID DIAS: 11 CM/S
LEFT CCA MID SYS: 65 CM/S
LEFT CCA PROX DIAS: 13 CM/S
LEFT CCA PROX SYS: 89 CM/S
LEFT ECA DIAS: 4 CM/S
LEFT ECA SYS: 77 CM/S
LEFT ICA DIST DIAS: 32 CM/S
LEFT ICA DIST SYS: 131 CM/S
LEFT ICA MID DIAS: 32 CM/S
LEFT ICA MID SYS: 110 CM/S
LEFT ICA PROX DIAS: 12 CM/S
LEFT ICA PROX SYS: 40 CM/S
LEFT VERTEBRAL DIAS: 8 CM/S
LEFT VERTEBRAL SYS: 32 CM/S
OHS CV CAROTID RIGHT ICA EDV HIGHEST: 22
OHS CV CAROTID ULTRASOUND LEFT ICA/CCA RATIO: 2.52
OHS CV CAROTID ULTRASOUND RIGHT ICA/CCA RATIO: 1.35
OHS CV PV CAROTID LEFT HIGHEST CCA: 89
OHS CV PV CAROTID LEFT HIGHEST ICA: 131
OHS CV PV CAROTID RIGHT HIGHEST CCA: 67
OHS CV PV CAROTID RIGHT HIGHEST ICA: 81
OHS CV US CAROTID LEFT HIGHEST EDV: 32
RIGHT ARM DIASTOLIC BLOOD PRESSURE: 74 MMHG
RIGHT ARM SYSTOLIC BLOOD PRESSURE: 115 MMHG
RIGHT CBA DIAS: 10 CM/S
RIGHT CBA SYS: 55 CM/S
RIGHT CCA DIST DIAS: 11 CM/S
RIGHT CCA DIST SYS: 60 CM/S
RIGHT CCA MID DIAS: 8 CM/S
RIGHT CCA MID SYS: 58 CM/S
RIGHT CCA PROX DIAS: 7 CM/S
RIGHT CCA PROX SYS: 67 CM/S
RIGHT ECA DIAS: 4 CM/S
RIGHT ECA SYS: 81 CM/S
RIGHT ICA DIST DIAS: 22 CM/S
RIGHT ICA DIST SYS: 81 CM/S
RIGHT ICA MID DIAS: 14 CM/S
RIGHT ICA MID SYS: 61 CM/S
RIGHT ICA PROX DIAS: 11 CM/S
RIGHT ICA PROX SYS: 42 CM/S
RIGHT VERTEBRAL DIAS: 8 CM/S
RIGHT VERTEBRAL SYS: 40 CM/S

## 2025-01-31 PROCEDURE — 93880 EXTRACRANIAL BILAT STUDY: CPT | Mod: 26,,, | Performed by: INTERNAL MEDICINE

## 2025-01-31 PROCEDURE — 93880 EXTRACRANIAL BILAT STUDY: CPT

## 2025-02-01 LAB
ABETA 42/40 RATIO: 0.16
AL BETA40 PLAS-MCNC: 338 PG/ML
AL BETA42 PLAS-MCNC: 53 PG/ML
LC PHOSPHO-TAU (181P): 1.02 PG/ML (ref 0–0.97)

## 2025-02-06 ENCOUNTER — OFFICE VISIT (OUTPATIENT)
Dept: PRIMARY CARE CLINIC | Facility: CLINIC | Age: 75
End: 2025-02-06
Payer: MEDICARE

## 2025-02-06 VITALS
DIASTOLIC BLOOD PRESSURE: 78 MMHG | OXYGEN SATURATION: 98 % | BODY MASS INDEX: 29.58 KG/M2 | WEIGHT: 188.5 LBS | RESPIRATION RATE: 16 BRPM | HEART RATE: 74 BPM | HEIGHT: 67 IN | SYSTOLIC BLOOD PRESSURE: 132 MMHG

## 2025-02-06 DIAGNOSIS — E55.9 VITAMIN D DEFICIENCY: ICD-10-CM

## 2025-02-06 DIAGNOSIS — G47.00 INSOMNIA, UNSPECIFIED TYPE: ICD-10-CM

## 2025-02-06 DIAGNOSIS — E78.5 DYSLIPIDEMIA: Primary | ICD-10-CM

## 2025-02-06 DIAGNOSIS — I25.10 CORONARY ARTERY DISEASE, UNSPECIFIED VESSEL OR LESION TYPE, UNSPECIFIED WHETHER ANGINA PRESENT, UNSPECIFIED WHETHER NATIVE OR TRANSPLANTED HEART: ICD-10-CM

## 2025-02-06 DIAGNOSIS — N18.32 STAGE 3B CHRONIC KIDNEY DISEASE: ICD-10-CM

## 2025-02-06 PROCEDURE — 1126F AMNT PAIN NOTED NONE PRSNT: CPT | Mod: CPTII,S$GLB,, | Performed by: PHYSICIAN ASSISTANT

## 2025-02-06 PROCEDURE — 99214 OFFICE O/P EST MOD 30 MIN: CPT | Mod: S$GLB,,, | Performed by: PHYSICIAN ASSISTANT

## 2025-02-06 PROCEDURE — 1101F PT FALLS ASSESS-DOCD LE1/YR: CPT | Mod: CPTII,S$GLB,, | Performed by: PHYSICIAN ASSISTANT

## 2025-02-06 PROCEDURE — 1159F MED LIST DOCD IN RCRD: CPT | Mod: CPTII,S$GLB,, | Performed by: PHYSICIAN ASSISTANT

## 2025-02-06 PROCEDURE — 3288F FALL RISK ASSESSMENT DOCD: CPT | Mod: CPTII,S$GLB,, | Performed by: PHYSICIAN ASSISTANT

## 2025-02-06 PROCEDURE — 3078F DIAST BP <80 MM HG: CPT | Mod: CPTII,S$GLB,, | Performed by: PHYSICIAN ASSISTANT

## 2025-02-06 PROCEDURE — 3075F SYST BP GE 130 - 139MM HG: CPT | Mod: CPTII,S$GLB,, | Performed by: PHYSICIAN ASSISTANT

## 2025-02-06 RX ORDER — TRAZODONE HYDROCHLORIDE 100 MG/1
100 TABLET ORAL NIGHTLY
Qty: 90 TABLET | Refills: 3 | Status: SHIPPED | OUTPATIENT
Start: 2025-02-06

## 2025-02-06 NOTE — Clinical Note
Please see if patient would be willing to see Cardiology for a stress test. She has a history of CAD and I do not see one in her record.

## 2025-02-06 NOTE — Clinical Note
Dr Encinas  Ms Cesar asked to have her Trazodone to be increased to 100 mg. I went ahead and increase it, but wanted to make sure it was ok by you.   Gurjit

## 2025-02-06 NOTE — PROGRESS NOTES
Subjective     Patient ID: Tali Fitzpatrick is a 74 y.o. female.    Chief Complaint: Follow-up    Patient is a 75 yo female who comes in today for a check up. He has been spending some time with her family in [place].     Patient Active Problem List:     Bilateral primary osteoarthritis of knee     Bilateral knee pain     Stage 3b chronic kidney disease: last CMP was 2 months ago.      Hypertension: controlled     Anxiety     Dyslipidemia     Vitamin D deficiency     Cognitive complaints with normal exam     Word finding difficulty     Psychosocial stressors     Other amnesia     Depression     History of TIA (transient ischemic attack) and stroke     Coronary artery disease: Not seen cardiology in quite some time.      Insomnia: Has been not sleeping well. Currently on trazodone 50 mg nightly.     Followed by psychiatry    Past Medical History:  No date: Anxiety and depression  No date: CKD (chronic kidney disease)  No date: DJD (degenerative joint disease)      Comment:  bilateral knees   Hyalgan series completed 8/27/14  No date: Hyperlipidemia  No date: Hypertension  No date: Insomnia           Follow-up  Pertinent negatives include no abdominal pain, chest pain, fatigue, fever, headaches or numbness.     Review of Systems   Constitutional:  Negative for fatigue and fever.   Respiratory:  Negative for chest tightness and shortness of breath.    Cardiovascular:  Negative for chest pain.   Gastrointestinal:  Negative for abdominal pain.   Neurological:  Positive for dizziness. Negative for syncope, numbness and headaches.          Objective     Physical Exam  Vitals reviewed.   Constitutional:       General: She is not in acute distress.     Appearance: Normal appearance. She is not ill-appearing, toxic-appearing or diaphoretic.   Cardiovascular:      Rate and Rhythm: Normal rate and regular rhythm.      Pulses: Normal pulses.      Heart sounds: Normal heart sounds. No murmur heard.     No friction rub. No  gallop.   Pulmonary:      Effort: Pulmonary effort is normal. No respiratory distress.      Breath sounds: Normal breath sounds. No stridor. No wheezing, rhonchi or rales.   Chest:      Chest wall: No tenderness.   Abdominal:      Palpations: Abdomen is soft.      Tenderness: There is no abdominal tenderness.   Musculoskeletal:      Right lower leg: No edema.      Left lower leg: No edema.   Neurological:      Mental Status: She is alert.       Lab Results   Component Value Date    WBC 5.2 10/11/2024    HGB 12.8 10/11/2024    HCT 41.9 10/11/2024    MCV 80 (L) 05/09/2024     10/11/2024     CMP  Sodium   Date Value Ref Range Status   11/15/2024 138 136 - 145 mmol/L Final     Potassium   Date Value Ref Range Status   11/15/2024 3.6 3.5 - 5.1 mmol/L Final     Chloride   Date Value Ref Range Status   11/15/2024 107 95 - 110 mmol/L Final     CO2   Date Value Ref Range Status   11/15/2024 23 23 - 29 mmol/L Final     Glucose   Date Value Ref Range Status   11/15/2024 120 (H) 70 - 110 mg/dL Final     BUN   Date Value Ref Range Status   11/15/2024 16 8 - 23 mg/dL Final     Creatinine   Date Value Ref Range Status   11/15/2024 1.7 (H) 0.5 - 1.4 mg/dL Final     Calcium   Date Value Ref Range Status   11/15/2024 9.3 8.7 - 10.5 mg/dL Final     Total Protein   Date Value Ref Range Status   11/15/2024 7.5 6.0 - 8.4 g/dL Final     Albumin   Date Value Ref Range Status   11/15/2024 3.8 3.5 - 5.2 g/dL Final     Total Bilirubin   Date Value Ref Range Status   11/15/2024 0.3 0.1 - 1.0 mg/dL Final     Comment:     For infants and newborns, interpretation of results should be based  on gestational age, weight and in agreement with clinical  observations.    Premature Infant recommended reference ranges:  Up to 24 hours.............<8.0 mg/dL  Up to 48 hours............<12.0 mg/dL  3-5 days..................<15.0 mg/dL  6-29 days.................<15.0 mg/dL       Alkaline Phosphatase   Date Value Ref Range Status   11/15/2024 70 40  - 150 U/L Final     AST   Date Value Ref Range Status   11/15/2024 14 10 - 40 U/L Final     ALT   Date Value Ref Range Status   11/15/2024 12 10 - 44 U/L Final     Anion Gap   Date Value Ref Range Status   11/15/2024 8 8 - 16 mmol/L Final     eGFR if    Date Value Ref Range Status   03/11/2022 51.8 (A) >60 mL/min/1.73 m^2 Final     eGFR    Date Value Ref Range Status   06/13/2024 35 mL/min/1.73mSq Final     Comment:     In accordance with NKF-ASN Task Force recommendation, calculation based on the Chronic Kidney Disease Epidemiology Collaboration (CKD-EPI) equation without adjustment for race. eGFR adjusted for gender and age and calculated in ml/min/1.73mSquared. eGFR cannot be calculated if patient is under 18 years of age.     Reference Range:   >= 60 ml/min/1.73mSquared.     eGFR if non    Date Value Ref Range Status   03/11/2022 44.9 (A) >60 mL/min/1.73 m^2 Final     Comment:     Calculation used to obtain the estimated glomerular filtration  rate (eGFR) is the CKD-EPI equation.        Lab Results   Component Value Date    CHOL 209 (H) 10/26/2023     Lab Results   Component Value Date    HDL 65 10/26/2023     Lab Results   Component Value Date    LDLCALC 129.4 10/26/2023     Lab Results   Component Value Date    TRIG 73 10/26/2023     Lab Results   Component Value Date    CHOLHDL 31.1 10/26/2023     Lab Results   Component Value Date    HGBA1C 5.6 11/15/2024           Assessment and Plan     1. Dyslipidemia  -     Lipid Panel; Future; Expected date: 02/06/2025    2. Insomnia, unspecified type  -   increase:  traZODone (DESYREL) 100 MG tablet; Take 1 tablet (100 mg total) by mouth nightly.  Dispense: 90 tablet; Refill: 3    -     TSH; Future; Expected date: 02/06/2025  -     T3, Free; Future; Expected date: 02/06/2025  -     T4, Free; Future; Expected date: 02/06/2025    3. Stage 3b chronic kidney disease  -     Comprehensive Metabolic Panel; Future; Expected date:  02/06/2025    4. Vitamin D deficiency  -     Calcitriol; Future; Expected date: 02/06/2025        I spent 30 minutes on this encounter, time includes face-to-face, chart review, documentation, test review and orders.           No follow-ups on file.

## 2025-02-11 LAB — AL BETA40 PLAS-MCNC: 87 PG/ML (ref 20–80)

## 2025-03-06 NOTE — PLAN OF CARE
Pt AAOx3. Resp even, unlabored. No complaints of pain at this time. NAD noted. Pt tolerating PO well. Discharge and follow-up instructions discussed. Pt and family verbalize understanding. Pt ready for discharge.      Coagulation

## 2025-03-14 ENCOUNTER — LAB VISIT (OUTPATIENT)
Dept: PRIMARY CARE CLINIC | Facility: CLINIC | Age: 75
End: 2025-03-14
Payer: MEDICARE

## 2025-03-14 DIAGNOSIS — G47.00 INSOMNIA, UNSPECIFIED TYPE: ICD-10-CM

## 2025-03-14 DIAGNOSIS — N18.32 STAGE 3B CHRONIC KIDNEY DISEASE: ICD-10-CM

## 2025-03-14 DIAGNOSIS — R73.09 ELEVATED GLUCOSE: ICD-10-CM

## 2025-03-14 DIAGNOSIS — E55.9 VITAMIN D DEFICIENCY: ICD-10-CM

## 2025-03-14 DIAGNOSIS — E78.5 DYSLIPIDEMIA: ICD-10-CM

## 2025-03-14 LAB
ALBUMIN SERPL BCP-MCNC: 3.6 G/DL (ref 3.5–5.2)
ALP SERPL-CCNC: 62 U/L (ref 40–150)
ALT SERPL W/O P-5'-P-CCNC: 8 U/L (ref 10–44)
ANION GAP SERPL CALC-SCNC: 10 MMOL/L (ref 8–16)
AST SERPL-CCNC: 14 U/L (ref 10–40)
BILIRUB SERPL-MCNC: 0.4 MG/DL (ref 0.1–1)
BUN SERPL-MCNC: 18 MG/DL (ref 8–23)
CALCIUM SERPL-MCNC: 9.4 MG/DL (ref 8.7–10.5)
CHLORIDE SERPL-SCNC: 108 MMOL/L (ref 95–110)
CHOLEST SERPL-MCNC: 187 MG/DL (ref 120–199)
CHOLEST/HDLC SERPL: 3.2 {RATIO} (ref 2–5)
CO2 SERPL-SCNC: 23 MMOL/L (ref 23–29)
CREAT SERPL-MCNC: 1.5 MG/DL (ref 0.5–1.4)
EST. GFR  (NO RACE VARIABLE): 35.9 ML/MIN/1.73 M^2
ESTIMATED AVG GLUCOSE: 114 MG/DL (ref 68–131)
GLUCOSE SERPL-MCNC: 87 MG/DL (ref 70–110)
HBA1C MFR BLD: 5.6 % (ref 4–5.6)
HDLC SERPL-MCNC: 58 MG/DL (ref 40–75)
HDLC SERPL: 31 % (ref 20–50)
LDLC SERPL CALC-MCNC: 112.6 MG/DL (ref 63–159)
NONHDLC SERPL-MCNC: 129 MG/DL
POTASSIUM SERPL-SCNC: 3.7 MMOL/L (ref 3.5–5.1)
PROT SERPL-MCNC: 7.3 G/DL (ref 6–8.4)
SODIUM SERPL-SCNC: 141 MMOL/L (ref 136–145)
T3FREE SERPL-MCNC: 3 PG/ML (ref 2.3–4.2)
T4 FREE SERPL-MCNC: 0.99 NG/DL (ref 0.71–1.51)
TRIGL SERPL-MCNC: 82 MG/DL (ref 30–150)
TSH SERPL DL<=0.005 MIU/L-ACNC: 1.99 UIU/ML (ref 0.4–4)

## 2025-03-14 PROCEDURE — 83036 HEMOGLOBIN GLYCOSYLATED A1C: CPT | Performed by: PHYSICIAN ASSISTANT

## 2025-03-14 PROCEDURE — 82652 VIT D 1 25-DIHYDROXY: CPT | Performed by: PHYSICIAN ASSISTANT

## 2025-03-14 PROCEDURE — 84439 ASSAY OF FREE THYROXINE: CPT | Performed by: PHYSICIAN ASSISTANT

## 2025-03-14 PROCEDURE — 84481 FREE ASSAY (FT-3): CPT | Performed by: PHYSICIAN ASSISTANT

## 2025-03-14 PROCEDURE — 84443 ASSAY THYROID STIM HORMONE: CPT | Performed by: PHYSICIAN ASSISTANT

## 2025-03-14 PROCEDURE — 80053 COMPREHEN METABOLIC PANEL: CPT | Performed by: PHYSICIAN ASSISTANT

## 2025-03-14 PROCEDURE — 80061 LIPID PANEL: CPT | Performed by: PHYSICIAN ASSISTANT

## 2025-03-17 ENCOUNTER — OFFICE VISIT (OUTPATIENT)
Dept: PRIMARY CARE CLINIC | Facility: CLINIC | Age: 75
End: 2025-03-17
Payer: MEDICARE

## 2025-03-17 VITALS
HEIGHT: 67 IN | HEART RATE: 74 BPM | SYSTOLIC BLOOD PRESSURE: 90 MMHG | DIASTOLIC BLOOD PRESSURE: 60 MMHG | OXYGEN SATURATION: 97 % | RESPIRATION RATE: 16 BRPM | BODY MASS INDEX: 29.31 KG/M2 | WEIGHT: 186.75 LBS

## 2025-03-17 DIAGNOSIS — N18.32 STAGE 3B CHRONIC KIDNEY DISEASE: Primary | ICD-10-CM

## 2025-03-17 DIAGNOSIS — I95.9 HYPOTENSION, UNSPECIFIED HYPOTENSION TYPE: ICD-10-CM

## 2025-03-17 DIAGNOSIS — E78.5 DYSLIPIDEMIA: ICD-10-CM

## 2025-03-17 PROCEDURE — 3078F DIAST BP <80 MM HG: CPT | Mod: CPTII,S$GLB,, | Performed by: PHYSICIAN ASSISTANT

## 2025-03-17 PROCEDURE — 1101F PT FALLS ASSESS-DOCD LE1/YR: CPT | Mod: CPTII,S$GLB,, | Performed by: PHYSICIAN ASSISTANT

## 2025-03-17 PROCEDURE — 1126F AMNT PAIN NOTED NONE PRSNT: CPT | Mod: CPTII,S$GLB,, | Performed by: PHYSICIAN ASSISTANT

## 2025-03-17 PROCEDURE — 85025 COMPLETE CBC W/AUTO DIFF WBC: CPT | Performed by: PHYSICIAN ASSISTANT

## 2025-03-17 PROCEDURE — 3074F SYST BP LT 130 MM HG: CPT | Mod: CPTII,S$GLB,, | Performed by: PHYSICIAN ASSISTANT

## 2025-03-17 PROCEDURE — 84466 ASSAY OF TRANSFERRIN: CPT | Performed by: PHYSICIAN ASSISTANT

## 2025-03-17 PROCEDURE — 82728 ASSAY OF FERRITIN: CPT | Performed by: PHYSICIAN ASSISTANT

## 2025-03-17 PROCEDURE — 1160F RVW MEDS BY RX/DR IN RCRD: CPT | Mod: CPTII,S$GLB,, | Performed by: PHYSICIAN ASSISTANT

## 2025-03-17 PROCEDURE — 1159F MED LIST DOCD IN RCRD: CPT | Mod: CPTII,S$GLB,, | Performed by: PHYSICIAN ASSISTANT

## 2025-03-17 PROCEDURE — 99214 OFFICE O/P EST MOD 30 MIN: CPT | Mod: S$GLB,,, | Performed by: PHYSICIAN ASSISTANT

## 2025-03-17 PROCEDURE — 3288F FALL RISK ASSESSMENT DOCD: CPT | Mod: CPTII,S$GLB,, | Performed by: PHYSICIAN ASSISTANT

## 2025-03-17 PROCEDURE — 36415 COLL VENOUS BLD VENIPUNCTURE: CPT | Mod: S$GLB,,, | Performed by: PHYSICIAN ASSISTANT

## 2025-03-17 RX ORDER — PRAVASTATIN SODIUM 80 MG/1
80 TABLET ORAL DAILY
Qty: 90 TABLET | Refills: 3 | Status: SHIPPED | OUTPATIENT
Start: 2025-03-17

## 2025-03-18 ENCOUNTER — RESULTS FOLLOW-UP (OUTPATIENT)
Dept: PRIMARY CARE CLINIC | Facility: CLINIC | Age: 75
End: 2025-03-18

## 2025-03-18 LAB
BASOPHILS # BLD AUTO: 0.04 K/UL (ref 0–0.2)
BASOPHILS NFR BLD: 1 % (ref 0–1.9)
DIFFERENTIAL METHOD BLD: ABNORMAL
EOSINOPHIL # BLD AUTO: 0.2 K/UL (ref 0–0.5)
EOSINOPHIL NFR BLD: 4 % (ref 0–8)
ERYTHROCYTE [DISTWIDTH] IN BLOOD BY AUTOMATED COUNT: 15.9 % (ref 11.5–14.5)
FERRITIN SERPL-MCNC: 47 NG/ML (ref 20–300)
HCT VFR BLD AUTO: 39.5 % (ref 37–48.5)
HGB BLD-MCNC: 12.1 G/DL (ref 12–16)
IMM GRANULOCYTES # BLD AUTO: 0.01 K/UL (ref 0–0.04)
IMM GRANULOCYTES NFR BLD AUTO: 0.2 % (ref 0–0.5)
IRON SERPL-MCNC: 89 UG/DL (ref 30–160)
LYMPHOCYTES # BLD AUTO: 1.3 K/UL (ref 1–4.8)
LYMPHOCYTES NFR BLD: 31.1 % (ref 18–48)
MCH RBC QN AUTO: 25.2 PG (ref 27–31)
MCHC RBC AUTO-ENTMCNC: 30.6 G/DL (ref 32–36)
MCV RBC AUTO: 82 FL (ref 82–98)
MONOCYTES # BLD AUTO: 0.2 K/UL (ref 0.3–1)
MONOCYTES NFR BLD: 5.2 % (ref 4–15)
NEUTROPHILS # BLD AUTO: 2.4 K/UL (ref 1.8–7.7)
NEUTROPHILS NFR BLD: 58.5 % (ref 38–73)
NRBC BLD-RTO: 0 /100 WBC
PLATELET # BLD AUTO: 237 K/UL (ref 150–450)
PMV BLD AUTO: 11.9 FL (ref 9.2–12.9)
RBC # BLD AUTO: 4.81 M/UL (ref 4–5.4)
SATURATED IRON: 27 % (ref 20–50)
TOTAL IRON BINDING CAPACITY: 330 UG/DL (ref 250–450)
TRANSFERRIN SERPL-MCNC: 223 MG/DL (ref 200–375)
WBC # BLD AUTO: 4.02 K/UL (ref 3.9–12.7)

## 2025-05-30 ENCOUNTER — OFFICE VISIT (OUTPATIENT)
Dept: PRIMARY CARE CLINIC | Facility: CLINIC | Age: 75
End: 2025-05-30
Payer: MEDICARE

## 2025-05-30 VITALS
WEIGHT: 183.19 LBS | RESPIRATION RATE: 17 BRPM | SYSTOLIC BLOOD PRESSURE: 123 MMHG | BODY MASS INDEX: 28.75 KG/M2 | HEART RATE: 69 BPM | HEIGHT: 67 IN | OXYGEN SATURATION: 99 % | DIASTOLIC BLOOD PRESSURE: 82 MMHG

## 2025-05-30 DIAGNOSIS — N18.32 STAGE 3B CHRONIC KIDNEY DISEASE: Primary | ICD-10-CM

## 2025-05-30 DIAGNOSIS — R73.03 PRE-DIABETES: ICD-10-CM

## 2025-05-30 NOTE — PROGRESS NOTES
Patient ID: Tali Fitzpatrick is a 75 y.o. female.    Chief Complaint: Follow-up (Requesting lab for a1c)  Last seen in primary care by HERIBERTO Dean on 02/06/2025.  First time seeing me in the primary care clinic.    History of Present Illness    CHIEF COMPLAINT:  Patient presents today for follow up and to request hemoglobin A1c testing    DIABETES:  She has a history of pre-diabetes with most recent A1c of 5.6 two months ago and highest recorded A1c of 5.8. She reports regular consumption of sweet tea, approximately 2 gallons at a time. She reports frequent urination at night and drinks water throughout the night, keeping water at bedside.    CHRONIC KIDNEY DISEASE:  She has a history of kidney disease for several years with current filtration rate of 35.9, having fluctuated between 30 and 42. She has a positive family history of kidney disease with brother currently on dialysis.    LIPIDS:  Total cholesterol has improved from 209 to 187. LDL has decreased from 129 to 112. Triglycerides are in the 70s and 80s range.    CURRENT MEDICATIONS:  She is currently taking Norvasc, Pravachol, and Astelin. She denies taking NSAIDs including Motrin, Aleve, Advil, and naproxen.    NEUROLOGICAL:  She reports tingling sensations in the left leg and foot. She denies any history of back injury.      ROS:  General: -fever, -chills, -fatigue, -weight gain, -weight loss  Eyes: -vision changes, -redness, -discharge  ENT: -ear pain, -nasal congestion, -sore throat  Cardiovascular: -chest pain, -palpitations, -lower extremity edema  Respiratory: -cough, -shortness of breath  Gastrointestinal: -abdominal pain, -nausea, -vomiting, -diarrhea, -constipation, -blood in stool  Genitourinary: -dysuria, -hematuria, -frequency, +excessive urination  Musculoskeletal: -joint pain, -muscle pain  Skin: -rash, -lesion  Neurological: -headache, -dizziness, -numbness, +tingling  Psychiatric: -anxiety, -depression, -sleep difficulty         Physical  Exam    General: No acute distress. Well-developed. Well-nourished.  Eyes: EOMI. Sclerae anicteric.  HENT: Normocephalic. Atraumatic. Nares patent. Moist oral mucosa.  Ears: Bilateral TMs clear. Bilateral EACs clear.  Cardiovascular: Regular rate. Regular rhythm. No murmurs. No rubs. No gallops. Normal S1, S2. No swelling in left leg and foot. Good pulse in left leg and foot.  Respiratory: Normal respiratory effort. Clear to auscultation bilaterally. No rales. No rhonchi. No wheezing.  Abdomen: Soft. Non-tender. Non-distended. Normoactive bowel sounds.  Musculoskeletal: No  obvious deformity.  Extremities: No lower extremity edema.  Neurological: Alert & oriented x3. No slurred speech. Normal gait.  Psychiatric: Normal mood. Normal affect. Good insight. Good judgment.  Skin: Warm. Dry. No rash.         Assessment & Plan    N18.30 Chronic kidney disease, Stage III unspecified  R73.03 Prediabetes  E78.5 Hyperlipidemia, unspecified  R20.2 Paresthesia of skin  R35.1 Nocturia  R63.1 Polydipsia  Z84.89 Family history of other specified conditions    CHRONIC KIDNEY DISEASE, STAGE 3B:  - Evaluated renal function, noting concerning eGFR of 35.9 (stage 3A CKD).  - Patient's kidney function has been fluctuating between 30-35 for a few years.  - Ruled out medication-induced issues for reduced renal function.  - Educated patient on eGFR values, stages of CKD, and when intervention becomes necessary (eGFR below 20).  - Ordered urinalysis to check for protein, renal ultrasound to check for renal artery stenosis, and renal function test to be completed before nephrology appointment.  - Referred patient to nephrology for evaluation of CKD.    PREDIABETES:  - Reviewed A1C of 5.6 from 2 months ago, which is below prediabetes range (5.7-6.4).  - Patient's highest recorded A1c was 5.8.  - Explained A1C levels: prediabetes range (5.7-6.4) and diabetes threshold (6.5).  - Discussed high fructose corn syrup as a major contributor to  elevated blood sugar, particularly in beverages.  - Advised patient on reading ingredient labels for hidden sugars and recommended choosing teas and juices with less sugar.  - Ordered HbA1c test to be done when returning for 3-month follow-up visit.    HYPERLIPIDEMIA:  - Assessed cholesterol levels, noting improvement with total cholesterol dropping from 209 to 187 and LDL from 129 to 112.  - Planned to monitor cholesterol levels every 6 months along with A1c test.  - Advised patient to continue managing cholesterol through diet and lifestyle changes.    PARESTHESIA:  - Evaluated patient's report of tingling in the left leg and foot.  - Found good pulse in the left leg and foot, with no swelling or redness, indicating no immediate concern.    NOCTURIA AND POLYDIPSIA:  - Patient reports frequent urination at night and drinking a lot of water, especially before bed.  - Determined both nocturia and polydipsia are likely due to patient's habits rather than medical conditions.  - Advised limiting fluid intake a few hours before bedtime to reduce nighttime urination.    FAMILY HISTORY OF KIDNEY DISEASE:  - Documented that patient's brother has diabetes and is on dialysis, indicating a family history of kidney disease.  - This history has been considered in the patient's management plan.    FOLLOW-UP PLAN:  - Patient to stop drinking bottled tea due to high sugar content and make own tea with minimal added sweeteners.  - Scheduled follow up in 3 months with labs including hemoglobin A1C and kidney function tests to be completed before the next visit.          BP Readings from Last 3 Encounters:   05/30/25 123/82   03/17/25 90/60   02/06/25 132/78      Medication List with Changes/Refills   Current Medications    AMLODIPINE (NORVASC) 10 MG TABLET    Take 1 tablet (10 mg total) by mouth once daily.    ATENOLOL (TENORMIN) 50 MG TABLET    Take 1 tablet (50 mg total) by mouth once daily.    AZELASTINE (ASTELIN) 137 MCG (0.1 %)  NASAL SPRAY    1-2 times per day.    BUSPIRONE (BUSPAR) 30 MG TAB    Take 1 tablet (30 mg total) by mouth 2 (two) times daily. Morning and evening.    CYANOCOBALAMIN (VITAMIN B-12) 1000 MCG TABLET    Take 1,000 mcg by mouth once daily.    DULOXETINE (CYMBALTA) 20 MG CAPSULE    Take 1 capsule (20 mg total) by mouth once daily.    FLUTICASONE PROPIONATE (FLONASE) 50 MCG/ACTUATION NASAL SPRAY    2 sprays (100 mcg total) by Each Nostril route once daily.    FOLIC ACID/MULTIVIT-MIN/LUTEIN (CENTRUM SILVER ORAL)    Take 1 tablet by mouth once daily.    HYDROXYZINE PAMOATE (VISTARIL) 50 MG CAP    TAKE 1 CAPSULE EVERY 8 (EIGHT) HOURS AS NEEDED.    PRAVASTATIN (PRAVACHOL) 80 MG TABLET    Take 1 tablet (80 mg total) by mouth once daily.    PROMETHAZINE-DEXTROMETHORPHAN (PROMETHAZINE-DM) 6.25-15 MG/5 ML SYRP    Take 10 mLs by mouth nightly as needed (cough).    TRAZODONE (DESYREL) 100 MG TABLET    Take 1 tablet (100 mg total) by mouth nightly.        Follow up in about 3 months (around 8/30/2025).    This note was generated with the assistance of ambient listening technology. Verbal consent was obtained by the patient and accompanying visitor(s) for the recording of patient appointment to facilitate this note. I attest to having reviewed and edited the generated note for accuracy, though some syntax or spelling errors may persist. Please contact the author of this note for any clarification.

## 2025-06-04 ENCOUNTER — TELEPHONE (OUTPATIENT)
Dept: NEPHROLOGY | Facility: CLINIC | Age: 75
End: 2025-06-04
Payer: MEDICARE

## 2025-06-16 ENCOUNTER — HOSPITAL ENCOUNTER (OUTPATIENT)
Dept: CARDIOLOGY | Facility: HOSPITAL | Age: 75
Discharge: HOME | End: 2025-06-16
Attending: NURSE PRACTITIONER
Payer: MEDICARE

## 2025-06-16 DIAGNOSIS — N18.32 STAGE 3B CHRONIC KIDNEY DISEASE: ICD-10-CM

## 2025-06-16 LAB
ABDOMINAL AORTA PROX PSV: 90 CM/S
LEFT RENAL DIST DIAS: 10 CM/S
LEFT RENAL DIST SYS: 49 CM/S
LEFT RENAL MID DIAS: 16 CM/S
LEFT RENAL MID SYS: 48 CM/S
LEFT RENAL ORIGIN DIAS: 27 CM/S
LEFT RENAL ORIGIN SYS: 114 CM/S
LEFT RENAL PROX DIAS: 24 CM/S
LEFT RENAL PROX RAR: 1.12
LEFT RENAL PROX SYS: 101 CM/S
LEFT RENAL ULTRASOUND ACCELERATION TIME MEASUREMENT 1: 34 MS
LEFT RENAL ULTRASOUND ACCELERATION TIME MEASUREMENT 2: 31 MS
LEFT RENAL ULTRASOUND ACCELERATION TIME MEASUREMENT 3: 43 MS
LEFT RENAL ULTRASOUND ACCELERATION TIME MEASUREMENT AVERAGE: 43 MS
LEFT RENAL ULTRASOUND KIDNEY SIZE MEASUREMENT 1: 9.6 CM
LEFT RENAL ULTRASOUND KIDNEY SIZE MEASUREMENT 2: 9.9 CM
LEFT RENAL ULTRASOUND KIDNEY SIZE MEASUREMENT 3: 9.8 CM
LEFT RENAL ULTRASOUND KIDNEY SIZE MEASUREMENT AVERAGE: 9.9 CM
LEFT RENAL ULTRASOUND RESISTIVE INDEX MEASUREMENT 1: 0.72
LEFT RENAL ULTRASOUND RESISTIVE INDEX MEASUREMENT 2: 0.74
LEFT RENAL ULTRASOUND RESISTIVE INDEX MEASUREMENT 3: 0.72
LEFT RENAL ULTRASOUND RESISTIVE INDEX MEASUREMENT AVERAGE: 0.74
OHS CV LEFT RENAL RAR: 1.27
OHS CV RIGHT RENAL RAR: 1.14
OHS CV US LEFT RENAL HIGHEST EDV: 27
OHS CV US LEFT RENAL HIGHEST PSV: 114
OHS CV US RIGHT RENAL HIGHEST EDV: 21
OHS CV US RIGHT RENAL HIGHEST PSV: 103
RIGHT RENAL DIST DIAS: 17 CM/S
RIGHT RENAL DIST SYS: 97 CM/S
RIGHT RENAL MID DIAS: 17 CM/S
RIGHT RENAL MID SYS: 69 CM/S
RIGHT RENAL ORIGIN DIAS: 21 CM/S
RIGHT RENAL ORIGIN SYS: 95 CM/S
RIGHT RENAL PROX DIAS: 20 CM/S
RIGHT RENAL PROX RAR: 1.14
RIGHT RENAL PROX SYS: 103 CM/S
RIGHT RENAL ULTRASOUND ACCELERATION TIME MEASUREMENT 1: 40 MS
RIGHT RENAL ULTRASOUND ACCELERATION TIME MEASUREMENT 2: 31 MS
RIGHT RENAL ULTRASOUND ACCELERATION TIME MEASUREMENT 3: 49 MS
RIGHT RENAL ULTRASOUND ACCELERATION TIME MEASUREMENT AVERAGE: 49 MS
RIGHT RENAL ULTRASOUND KIDNEY SIZE MEASUREMENT 1: 9 CM
RIGHT RENAL ULTRASOUND KIDNEY SIZE MEASUREMENT 2: 9.7 CM
RIGHT RENAL ULTRASOUND KIDNEY SIZE MEASUREMENT 3: 9.5 CM
RIGHT RENAL ULTRASOUND KIDNEY SIZE MEASUREMENT AVERAGE: 9.7 CM
RIGHT RENAL ULTRASOUND RESISTIVE INDEX MEASUREMENT 1: 0.81
RIGHT RENAL ULTRASOUND RESISTIVE INDEX MEASUREMENT 2: 0.71
RIGHT RENAL ULTRASOUND RESISTIVE INDEX MEASUREMENT 3: 0.72
RIGHT RENAL ULTRASOUND RESISTIVE INDEX MEASUREMENT AVERAGE: 0.81

## 2025-06-16 PROCEDURE — 93975 VASCULAR STUDY: CPT | Mod: PO

## 2025-06-16 PROCEDURE — 93975 VASCULAR STUDY: CPT | Mod: 26,,, | Performed by: INTERNAL MEDICINE

## 2025-06-17 DIAGNOSIS — I10 HYPERTENSION, UNSPECIFIED TYPE: ICD-10-CM

## 2025-06-18 RX ORDER — AMLODIPINE BESYLATE 10 MG/1
10 TABLET ORAL DAILY
Qty: 90 TABLET | Refills: 1 | Status: SHIPPED | OUTPATIENT
Start: 2025-06-18

## 2025-07-02 ENCOUNTER — OFFICE VISIT (OUTPATIENT)
Dept: NEUROLOGY | Facility: CLINIC | Age: 75
End: 2025-07-02
Payer: MEDICARE

## 2025-07-02 DIAGNOSIS — F41.9 ANXIETY: ICD-10-CM

## 2025-07-02 DIAGNOSIS — F32.A DEPRESSION, UNSPECIFIED DEPRESSION TYPE: ICD-10-CM

## 2025-07-02 DIAGNOSIS — G31.84 MCI (MILD COGNITIVE IMPAIRMENT) WITH MEMORY LOSS: Primary | ICD-10-CM

## 2025-07-02 PROCEDURE — 1159F MED LIST DOCD IN RCRD: CPT | Mod: CPTII,95,, | Performed by: PSYCHIATRY & NEUROLOGY

## 2025-07-02 PROCEDURE — 1160F RVW MEDS BY RX/DR IN RCRD: CPT | Mod: CPTII,95,, | Performed by: PSYCHIATRY & NEUROLOGY

## 2025-07-02 PROCEDURE — 98005 SYNCH AUDIO-VIDEO EST LOW 20: CPT | Mod: 95,,, | Performed by: PSYCHIATRY & NEUROLOGY

## 2025-07-09 DIAGNOSIS — N18.32 STAGE 3B CHRONIC KIDNEY DISEASE: Primary | ICD-10-CM

## 2025-07-15 ENCOUNTER — TELEPHONE (OUTPATIENT)
Dept: PRIMARY CARE CLINIC | Facility: CLINIC | Age: 75
End: 2025-07-15
Payer: MEDICARE

## 2025-07-15 ENCOUNTER — TELEPHONE (OUTPATIENT)
Dept: NEPHROLOGY | Facility: CLINIC | Age: 75
End: 2025-07-15
Payer: MEDICARE

## 2025-07-15 NOTE — TELEPHONE ENCOUNTER
Copied from CRM #[id number]. Topic: Appointments - Appointment Scheduling  >> Jul 15, 2025  4:52 PM Edgar wrote:  Type:  Appointment Request    Caller is requesting a sooner appointment.  Caller declined first available appointment listed below.  Caller will not accept being placed on the waitlist and is requesting a message be sent to doctor.  Name of Caller:Pt  When is the first available appointment? Scheduling Instructions:  Send [employer] message to department to determine which labs to schedule.  Patient Instructions:  The department will contact you for any labs you may need to complete prior to visit.    Symptoms:N18.32 (ICD-10-CM) - Stage 3b chronic kidney disease  Would the patient rather a call back or a response via [facility]? call  Best Call Back Number:[phone]   Additional Information: New pt need appt. Pt has not has labs. Please advise

## 2025-07-15 NOTE — TELEPHONE ENCOUNTER
----- Message from Tiffany Guy DNP, GENA sent at 7/15/2025  4:38 PM CDT -----  Please reach out and assist with nephrology appt in [place]. She has had a few appt [place] [facility] because her daughter lives there but she states will be able to keep better if in [place]   clinic.  ----- Message -----  From: Phyllis Collado MD  Sent: 6/16/2025  12:10 PM CDT  To: Tiffany Guy DNP, APRN

## 2025-07-31 ENCOUNTER — OFFICE VISIT (OUTPATIENT)
Dept: NEPHROLOGY | Facility: CLINIC | Age: 75
End: 2025-07-31
Payer: MEDICARE

## 2025-07-31 VITALS
OXYGEN SATURATION: 99 % | HEART RATE: 69 BPM | DIASTOLIC BLOOD PRESSURE: 60 MMHG | BODY MASS INDEX: 28.69 KG/M2 | HEIGHT: 67 IN | SYSTOLIC BLOOD PRESSURE: 116 MMHG

## 2025-07-31 DIAGNOSIS — E78.5 DYSLIPIDEMIA: ICD-10-CM

## 2025-07-31 DIAGNOSIS — Z86.73 HISTORY OF TIA (TRANSIENT ISCHEMIC ATTACK) AND STROKE: ICD-10-CM

## 2025-07-31 DIAGNOSIS — N18.32 STAGE 3B CHRONIC KIDNEY DISEASE: Primary | ICD-10-CM

## 2025-07-31 DIAGNOSIS — I10 PRIMARY HYPERTENSION: ICD-10-CM

## 2025-07-31 PROCEDURE — 3078F DIAST BP <80 MM HG: CPT | Mod: CPTII,S$GLB,, | Performed by: STUDENT IN AN ORGANIZED HEALTH CARE EDUCATION/TRAINING PROGRAM

## 2025-07-31 PROCEDURE — 3074F SYST BP LT 130 MM HG: CPT | Mod: CPTII,S$GLB,, | Performed by: STUDENT IN AN ORGANIZED HEALTH CARE EDUCATION/TRAINING PROGRAM

## 2025-07-31 PROCEDURE — 99999 PR PBB SHADOW E&M-EST. PATIENT-LVL III: CPT | Mod: PBBFAC,,, | Performed by: STUDENT IN AN ORGANIZED HEALTH CARE EDUCATION/TRAINING PROGRAM

## 2025-07-31 PROCEDURE — 1159F MED LIST DOCD IN RCRD: CPT | Mod: CPTII,S$GLB,, | Performed by: STUDENT IN AN ORGANIZED HEALTH CARE EDUCATION/TRAINING PROGRAM

## 2025-07-31 PROCEDURE — 99204 OFFICE O/P NEW MOD 45 MIN: CPT | Mod: S$GLB,,, | Performed by: STUDENT IN AN ORGANIZED HEALTH CARE EDUCATION/TRAINING PROGRAM

## 2025-07-31 NOTE — PROGRESS NOTES
[facility]  Nephrology Clinic    Subjective:      HPI ID: Tali Fitzpatrick is a 75 y.o. female who presents for new patient evaluation for chronic renal failure.    Tali Fitzpatrick is referred by No primary care provider on file. to be evaluated for chronic renal failure.  She has ongoing conditions of HTN, HLD, history of vitamin-D deficiency, mCi, history of TIA, osteoarthritis, and previously reported chronic kidney disease stage G3.  We reviewed recent lab trends at chair side.  Last chemistry profile is from 03/14/2025 featuring a serum creatinine of 1.5 mg/dL, which appears consistent with trends over the last 12 months, with prior baseline between 1.3 and 1.4 for many years.  Last UA CR is from 05/09/2024 and was 20 mg per g.    In terms of her renal history, she denies hospitalizations for prior PRAMOD or PRAMOD requiring RRT. Denies known history of nephrolithiasis or hematuria episodes. No reported history of frequent or recurrent use of NSAIDs/COXI. No pertinent rheumatologic or AI disease history. She has a brother with ESRD on iHD.   Other pertinent Uro/gyn history: denies  Smoking history: denies  Fluid intake in 24hrs: 40oz of water per day. 8oz coffee in AM    She has no uremic or urinary symptoms and is in her usual state of health.      During this visit, the patient and I reviewed her lab trends, discussed CKD epidemiology and risk factors, as well as general lifestyle and risk factor modifications to reduce her risk of progression to ESRD.       Review of Systems   Constitutional:  Negative for chills, diaphoresis and fever.   Respiratory:  Negative for cough and shortness of breath.    Cardiovascular:  Negative for chest pain and leg swelling.   Gastrointestinal:  Negative for abdominal pain, diarrhea, nausea and vomiting.   Genitourinary:  Negative for difficulty urinating, dysuria and hematuria.   Musculoskeletal:  Negative for myalgias.   Neurological:  Negative for headaches.  "  Hematological:  Does not bruise/bleed easily.       The past medical, family and social histories were reviewed for this encounter.     Past Medical History:   Diagnosis Date    Anxiety and depression     CKD (chronic kidney disease)     DJD (degenerative joint disease)     bilateral knees   Hyalgan series completed 8/27/14    Hyperlipidemia     Hypertension     Insomnia      Past Surgical History:   Procedure Laterality Date    CATARACT EXTRACTION W/  INTRAOCULAR LENS IMPLANT      left    EYE SURGERY      LIPOSUCTION      RADIOFREQUENCY ABLATION Right 03/04/2022    Procedure: Radiofrequency Ablation;  Surgeon: Deon Ragland MD;  Location: [facility] OR;  Service: Orthopedics;  Laterality: Right;  KNEE-Cooled    TONSILLECTOMY      TOTAL KNEE ARTHROPLASTY       Social History[1]  Current Outpatient Medications   Medication Sig    amLODIPine (NORVASC) 10 MG tablet Take 1 tablet (10 mg total) by mouth once daily.    atenoloL (TENORMIN) 50 MG tablet Take 1 tablet (50 mg total) by mouth once daily.    busPIRone (BUSPAR) 30 MG Tab Take 1 tablet (30 mg total) by mouth 2 (two) times daily. Morning and evening.    DULoxetine (CYMBALTA) 20 MG capsule Take 1 capsule (20 mg total) by mouth once daily.    fluticasone propionate (FLONASE) 50 mcg/actuation nasal spray 2 sprays (100 mcg total) by Each Nostril route once daily.    pravastatin (PRAVACHOL) 80 MG tablet Take 1 tablet (80 mg total) by mouth once daily.    traZODone (DESYREL) 100 MG tablet Take 1 tablet (100 mg total) by mouth nightly.     No current facility-administered medications for this visit.         Objective:   /60 (BP Location: Right arm, Patient Position: Sitting)   Pulse 69   Ht 5' 7" (1.702 m)   SpO2 99%   BMI 28.69 kg/m²      Physical Exam  Vitals reviewed.   Constitutional:       General: She is not in acute distress.     Appearance: Normal appearance.   HENT:      Head: Normocephalic and atraumatic.   Eyes:      General: No scleral " "icterus.     Extraocular Movements: Extraocular movements intact.   Cardiovascular:      Pulses: Normal pulses.      Heart sounds: Normal heart sounds.      No friction rub.   Pulmonary:      Effort: Pulmonary effort is normal. No respiratory distress.      Breath sounds: Normal breath sounds.   Abdominal:      General: Abdomen is flat.      Palpations: Abdomen is soft.   Musculoskeletal:         General: No swelling.      Cervical back: Normal range of motion. No tenderness.      Right lower leg: No edema.      Left lower leg: No edema.   Neurological:      Mental Status: She is oriented to person, place, and time.      Motor: No weakness.   Psychiatric:         Mood and Affect: Mood normal.         Behavior: Behavior normal.           Lab Results   Component Value Date    CREATININE 1.5 (H) 03/14/2025    BUN 18 03/14/2025     03/14/2025    K 3.7 03/14/2025     03/14/2025    CO2 23 03/14/2025     Lab Results   Component Value Date    CALCIUM 9.4 03/14/2025     Lab Results   Component Value Date    HCT 39.5 03/17/2025     No results found for: "UTPCR"    Lab Results   Component Value Date    MICALBCREAT 20.2 05/09/2024     Assessment:           1. Stage 3b chronic kidney disease    2. Primary hypertension    3. Dyslipidemia    4. History of TIA (transient ischemic attack) and stroke        Plan:   Return to clinic in 3 months  Baseline creatinine is 1.4-1.6mg/dL  Orders Placed This Encounter   Procedures    US Retroperitoneal Complete    Microalbumin/Creatinine Ratio, Urine    Comprehensive Metabolic Panel    Magnesium    Phosphorus    PTH, Intact    Protein/Creatinine Ratio, Urine    Urinalysis    STEVIE Screen w/Reflex    Rheumatoid Factor     CKD G3b / A1 PLAN  -risk: metabolic; HTN, HLD, age, TBD  -continue discussion and adjustment of modifiable risk factors. Educated patient on the importance of BP, glycemic and lipid control   -unknown allergy to ARB, will avoid for now  -avoid dehydration  -send for " imaging  -send for urine studies; restage patient at f/u appointment  --estimated low risk of renal progression based on KFRE model    HTN - bp trends reviewed. Med list reviewed. Continue current meds including ARB for now    Check MBD labs.    HLD-on statin for CKD-MACE risk reduction    H/o TIA - noted.   __________________________  Rebel Gómez MD  [facility] [place]    Part of this note has been created using [employer] dictation system. Errors in transcription may not be completely avoided.    KFRE 2-Year: 0.8% at 3/14/2025 11:18 AM  Calculated from:  Serum Creatinine: 1.5 mg/dL at 3/14/2025 11:18 AM  Urine Albumin Creatinine Ratio: 20.2 ug/mg at 5/9/2024  1:25 PM  Age: 74 years  Sex: Female at 3/14/2025 11:18 AM  Has CKD-3 to CKD-5: Yes    KFRE 5-Year: 2.3% at 3/14/2025 11:18 AM  Calculated from:  Serum Creatinine: 1.5 mg/dL at 3/14/2025 11:18 AM  Urine Albumin Creatinine Ratio: 20.2 ug/mg at 5/9/2024  1:25 PM  Age: 74 years  Sex: Female at 3/14/2025 11:18 AM  Has CKD-3 to CKD-5: Yes         [1]   Social History  Socioeconomic History    Marital status:     Number of children: 1   Occupational History    Occupation: Retired   Tobacco Use    Smoking status: Never     Passive exposure: Never    Smokeless tobacco: Never   Substance and Sexual Activity    Alcohol use: Yes     Alcohol/week: 0.0 standard drinks of alcohol     Comment: social    Drug use: Yes     Types: Benzodiazepines     Social Drivers of Health     Food Insecurity: Patient Declined (10/18/2024)    Received from [facility] and Its Subsidiaries and Affiliates    Hunger Vital Sign     Worried About Running Out of Food in the Last Year: Patient declined     Ran Out of Food in the Last Year: Patient declined   Transportation Needs: Patient Declined (10/18/2024)    Received from [facility] and Its Subsidiaries and Affiliates    PRAPARE - Transportation      Lack of Transportation (Medical): Patient declined     Lack of Transportation (Non-Medical): Patient declined   Housing Stability: Patient Declined (10/18/2024)    Received from [facility] and Its Subsidiaries and Affiliates    Housing Stability Vital Sign     Unable to Pay for Housing in the Last Year: Patient declined     Number of Times Moved in the Last Year: 0     Homeless in the Last Year: Patient declined

## (undated) DEVICE — NDL 18GA X1 1/2 REG BEVEL

## (undated) DEVICE — SYR DISP LL 5CC

## (undated) DEVICE — LABEL FOR UTILITY MARKER

## (undated) DEVICE — SEE MEDLINE ITEM 157128

## (undated) DEVICE — SYR 10CC LUER LOCK

## (undated) DEVICE — CUP MEDICINE STERILE 2OZ

## (undated) DEVICE — APPLICATOR CHLORAPREP CLR 10.5

## (undated) DEVICE — SEE MEDLINE ITEM 152678

## (undated) DEVICE — SEE MEDLINE ITEM 157131

## (undated) DEVICE — GLOVE SURG ULTRA TOUCH 8

## (undated) DEVICE — PAD GROUNDING DISPER ELECTRODE

## (undated) DEVICE — CUP MEDICINE 2OZ. STERILE

## (undated) DEVICE — NDL HYPO 27G X 1 1/2

## (undated) DEVICE — MARKER SKIN STND TIP BLUE BARR

## (undated) DEVICE — NDL SPINAL 25GX3.5 SPINOCAN

## (undated) DEVICE — NDL SAFETY 25G X 1.5 ECLIPSE

## (undated) DEVICE — KIT COOLED RF 75MM SGL PROBE

## (undated) DEVICE — GAUZE SPONGE 4X4 12PLY

## (undated) DEVICE — TOWEL OR DISP STRL BLUE 4/PK

## (undated) DEVICE — SOL IRR STRL WATER 500ML

## (undated) DEVICE — SPONGE DERMACEA GAUZE 4X4

## (undated) DEVICE — GLOVE SURGICAL LATEX SZ 8

## (undated) DEVICE — SEE MEDLINE ITEM 146231

## (undated) DEVICE — BANDAGE ADHESIVE PLASTIC 7/8IN

## (undated) DEVICE — SEE MEDLINE ITEM 152622

## (undated) DEVICE — SET EXTENSION MICROBORE

## (undated) DEVICE — CATH IV OPTIVA OCRILON

## (undated) DEVICE — KIT COOLED RF 75MM DBL PROBE

## (undated) DEVICE — SEE MEDLINE ITEM 146292